# Patient Record
Sex: FEMALE | Race: WHITE | NOT HISPANIC OR LATINO | Employment: FULL TIME | URBAN - METROPOLITAN AREA
[De-identification: names, ages, dates, MRNs, and addresses within clinical notes are randomized per-mention and may not be internally consistent; named-entity substitution may affect disease eponyms.]

---

## 2018-05-19 ENCOUNTER — HISTORICAL (OUTPATIENT)
Dept: LAB | Facility: HOSPITAL | Age: 63
End: 2018-05-19

## 2018-05-19 LAB
ALBUMIN SERPL-MCNC: 3.6 GM/DL (ref 3.4–5)
ALBUMIN/GLOB SERPL: 1.1 RATIO (ref 1.1–2)
ALP SERPL-CCNC: 104 UNIT/L (ref 46–116)
ALT SERPL-CCNC: 22 UNIT/L (ref 12–78)
AST SERPL-CCNC: 12 UNIT/L (ref 10–37)
BILIRUB SERPL-MCNC: 0.3 MG/DL (ref 0.2–1)
BILIRUBIN DIRECT+TOT PNL SERPL-MCNC: 0.05 MG/DL (ref 0–0.2)
BILIRUBIN DIRECT+TOT PNL SERPL-MCNC: 0.25 MG/DL
BUN SERPL-MCNC: 18 MG/DL (ref 7–18)
CALCIUM SERPL-MCNC: 9.1 MG/DL (ref 8.5–10.1)
CHLORIDE SERPL-SCNC: 107 MMOL/L (ref 98–107)
CHOLEST SERPL-MCNC: 188 MG/DL (ref 50–200)
CHOLEST/HDLC SERPL: 4 {RATIO} (ref 0–5)
CO2 SERPL-SCNC: 28.8 MMOL/L (ref 21–32)
CREAT SERPL-MCNC: 0.76 MG/DL (ref 0.55–1.02)
GLOBULIN SER-MCNC: 3.3 GM/DL (ref 2.4–3.5)
GLUCOSE SERPL-MCNC: 100 MG/DL (ref 74–106)
HDLC SERPL-MCNC: 44 MG/DL (ref 35–60)
LDLC SERPL CALC-MCNC: 115 MG/DL (ref 50–140)
POTASSIUM SERPL-SCNC: 4.6 MMOL/L (ref 3.5–5.1)
PROT SERPL-MCNC: 6.9 GM/DL (ref 6.4–8.2)
SODIUM SERPL-SCNC: 143 MMOL/L (ref 136–145)
T4 FREE SERPL-MCNC: 1.03 NG/DL (ref 0.76–4.46)
TRIGL SERPL-MCNC: 145 MG/DL (ref 30–150)
TSH SERPL-ACNC: 0.15 MIU/ML (ref 0.35–3.75)
VLDLC SERPL CALC-MCNC: 29 MG/DL

## 2019-01-26 ENCOUNTER — HISTORICAL (OUTPATIENT)
Dept: LAB | Facility: HOSPITAL | Age: 64
End: 2019-01-26

## 2019-01-26 LAB
ABS NEUT (OLG): 4.36
ALBUMIN SERPL-MCNC: 3.8 GM/DL (ref 3.4–5)
ALBUMIN/GLOB SERPL: 1.2 RATIO (ref 1.1–2)
ALP SERPL-CCNC: 96 UNIT/L (ref 46–116)
ALT SERPL-CCNC: 24 UNIT/L (ref 12–78)
APPEARANCE, UA: CLEAR
AST SERPL-CCNC: 14 UNIT/L (ref 10–37)
BACTERIA #/AREA URNS AUTO: NORMAL /HPF
BASOPHILS # BLD AUTO: 0.06 X10(3)/MCL
BASOPHILS NFR BLD AUTO: 0.7 %
BILIRUB SERPL-MCNC: 0.3 MG/DL (ref 0.2–1)
BILIRUB UR QL STRIP: NEGATIVE
BILIRUBIN DIRECT+TOT PNL SERPL-MCNC: 0.12 MG/DL (ref 0–0.2)
BILIRUBIN DIRECT+TOT PNL SERPL-MCNC: 0.18 MG/DL
BUN SERPL-MCNC: 14 MG/DL (ref 7–18)
CALCIUM SERPL-MCNC: 8.9 MG/DL (ref 8.5–10.1)
CHLORIDE SERPL-SCNC: 105 MMOL/L (ref 98–107)
CHOLEST SERPL-MCNC: 186 MG/DL (ref 50–200)
CHOLEST/HDLC SERPL: 4 {RATIO} (ref 0–5)
CO2 SERPL-SCNC: 28.5 MMOL/L (ref 21–32)
COLOR UR: YELLOW
CREAT SERPL-MCNC: 0.69 MG/DL (ref 0.55–1.02)
EOSINOPHIL # BLD AUTO: 0.52 X10(3)/MCL
EOSINOPHIL NFR BLD AUTO: 6.2 %
ERYTHROCYTE [DISTWIDTH] IN BLOOD BY AUTOMATED COUNT: 14 %
GLOBULIN SER-MCNC: 3.2 GM/DL (ref 2.4–3.5)
GLUCOSE (UA): NEGATIVE
GLUCOSE SERPL-MCNC: 108 MG/DL (ref 74–106)
HCT VFR BLD AUTO: 47.5 % (ref 34–46)
HDLC SERPL-MCNC: 53 MG/DL (ref 35–60)
HGB BLD-MCNC: 16.1 GM/DL (ref 11.3–15.4)
HGB UR QL STRIP: NEGATIVE
IMM GRANULOCYTES # BLD AUTO: 0.04 10*3/UL (ref 0–0.1)
IMM GRANULOCYTES NFR BLD AUTO: 0.5 % (ref 0–1)
KETONES UR QL STRIP: NEGATIVE
LDLC SERPL CALC-MCNC: 115 MG/DL (ref 50–140)
LEUKOCYTE ESTERASE UR QL STRIP: NEGATIVE
LYMPHOCYTES # BLD AUTO: 2.8 X10(3)/MCL
LYMPHOCYTES NFR BLD AUTO: 33.3 %
MCH RBC QN AUTO: 29.9 PG (ref 27–33)
MCHC RBC AUTO-ENTMCNC: 33.9 GM/DL (ref 32–35)
MCV RBC AUTO: 88.1 FL (ref 81–97)
MONOCYTES # BLD AUTO: 0.63 X10(3)/MCL
MONOCYTES NFR BLD AUTO: 7.5 %
MUCOUS THREADS URNS QL MICRO: PRESENT
NEUTROPHILS # BLD AUTO: 4.36 X10(3)/MCL
NEUTROPHILS NFR BLD AUTO: 51.8 %
NITRITE UR QL STRIP.AUTO: NEGATIVE
PH UR STRIP: 6 [PH] (ref 4.6–8)
PLATELET # BLD AUTO: 250 X10(3)/MCL (ref 151–368)
PMV BLD AUTO: 10 FL
POTASSIUM SERPL-SCNC: 4.2 MMOL/L (ref 3.5–5.1)
PROT SERPL-MCNC: 7 GM/DL (ref 6.4–8.2)
PROT UR QL STRIP: NEGATIVE
RBC # BLD AUTO: 5.39 X10(6)/MCL (ref 3.9–5)
RBC #/AREA URNS HPF: NORMAL /[HPF]
SODIUM SERPL-SCNC: 141 MMOL/L (ref 136–145)
SP GR UR STRIP: 1.02 (ref 1–1.03)
SQUAMOUS EPITHELIAL, UA: NORMAL
TRIGL SERPL-MCNC: 90 MG/DL (ref 30–150)
UROBILINOGEN UR STRIP-ACNC: 0.2
VLDLC SERPL CALC-MCNC: 18 MG/DL
WBC # SPEC AUTO: 8.41 X10(3)/MCL (ref 3.4–9.2)
WBC #/AREA URNS AUTO: NORMAL /HPF

## 2019-02-25 ENCOUNTER — HISTORICAL (OUTPATIENT)
Dept: RADIOLOGY | Facility: HOSPITAL | Age: 64
End: 2019-02-25

## 2020-09-30 ENCOUNTER — HISTORICAL (OUTPATIENT)
Dept: LAB | Facility: HOSPITAL | Age: 65
End: 2020-09-30

## 2020-09-30 LAB
ABS NEUT (OLG): 5.65
ALBUMIN SERPL-MCNC: 4 GM/DL (ref 3.4–4.8)
ALBUMIN/GLOB SERPL: 1.3 RATIO (ref 1.1–2)
ALP SERPL-CCNC: 108 UNIT/L (ref 40–150)
ALT SERPL-CCNC: 20 UNIT/L (ref 0–55)
AST SERPL-CCNC: 17 UNIT/L (ref 5–34)
BASOPHILS # BLD AUTO: 0.06 X10(3)/MCL
BASOPHILS NFR BLD AUTO: 0.7 %
BILIRUB SERPL-MCNC: 0.5 MG/DL
BILIRUBIN DIRECT+TOT PNL SERPL-MCNC: 0.2 MG/DL (ref 0–0.5)
BILIRUBIN DIRECT+TOT PNL SERPL-MCNC: 0.3 MG/DL
BUN SERPL-MCNC: 9 MG/DL (ref 9.8–20.1)
CALCIUM SERPL-MCNC: 9.4 MG/DL (ref 8.4–10.2)
CHLORIDE SERPL-SCNC: 107 MMOL/L (ref 98–107)
CHOLEST SERPL-MCNC: 205 MG/DL
CHOLEST/HDLC SERPL: 5 {RATIO} (ref 0–5)
CO2 SERPL-SCNC: 27 MEQ/L (ref 23–31)
CREAT SERPL-MCNC: 0.79 MG/DL (ref 0.55–1.02)
EOSINOPHIL # BLD AUTO: 0.45 X10(3)/MCL
EOSINOPHIL NFR BLD AUTO: 5 %
ERYTHROCYTE [DISTWIDTH] IN BLOOD BY AUTOMATED COUNT: 15 %
GLOBULIN SER-MCNC: 3.1 GM/DL (ref 2.4–3.5)
GLUCOSE SERPL-MCNC: 106 MG/DL (ref 82–115)
HCT VFR BLD AUTO: 48.6 % (ref 34–46)
HDLC SERPL-MCNC: 43 MG/DL (ref 35–60)
HGB BLD-MCNC: 16.2 GM/DL (ref 11.3–15.4)
IMM GRANULOCYTES # BLD AUTO: 0.02 10*3/UL (ref 0–0.1)
IMM GRANULOCYTES NFR BLD AUTO: 0.2 % (ref 0–1)
LDLC SERPL CALC-MCNC: 123 MG/DL (ref 50–140)
LYMPHOCYTES # BLD AUTO: 2.25 X10(3)/MCL
LYMPHOCYTES NFR BLD AUTO: 24.8 %
MCH RBC QN AUTO: 28.7 PG (ref 27–33)
MCHC RBC AUTO-ENTMCNC: 33.3 GM/DL (ref 32–35)
MCV RBC AUTO: 86 FL (ref 81–97)
MONOCYTES # BLD AUTO: 0.63 X10(3)/MCL
MONOCYTES NFR BLD AUTO: 7 %
NEUTROPHILS # BLD AUTO: 5.65 X10(3)/MCL
NEUTROPHILS NFR BLD AUTO: 62.3 %
PLATELET # BLD AUTO: 269 X10(3)/MCL (ref 140–450)
PMV BLD AUTO: 11 FL
POTASSIUM SERPL-SCNC: 4.2 MMOL/L (ref 3.5–5.1)
PROT SERPL-MCNC: 7.1 GM/DL (ref 5.8–7.6)
RBC # BLD AUTO: 5.65 X10(6)/MCL (ref 3.9–5)
SODIUM SERPL-SCNC: 144 MMOL/L (ref 136–145)
TRIGL SERPL-MCNC: 197 MG/DL (ref 37–140)
TSH SERPL-ACNC: 0.3 UIU/ML (ref 0.35–4.94)
VLDLC SERPL CALC-MCNC: 39 MG/DL
WBC # SPEC AUTO: 9.06 X10(3)/MCL (ref 3.4–9.2)

## 2020-10-23 ENCOUNTER — HISTORICAL (OUTPATIENT)
Dept: LAB | Facility: HOSPITAL | Age: 65
End: 2020-10-23

## 2020-10-23 LAB
T4 FREE SERPL-MCNC: 1.05 NG/DL (ref 0.7–1.48)
TSH SERPL-ACNC: 0.44 UIU/ML (ref 0.35–4.94)

## 2020-12-22 ENCOUNTER — HISTORICAL (OUTPATIENT)
Dept: RADIOLOGY | Facility: HOSPITAL | Age: 65
End: 2020-12-22

## 2020-12-23 ENCOUNTER — HISTORICAL (OUTPATIENT)
Dept: RADIOLOGY | Facility: HOSPITAL | Age: 65
End: 2020-12-23

## 2020-12-28 ENCOUNTER — HISTORICAL (OUTPATIENT)
Dept: RADIOLOGY | Facility: HOSPITAL | Age: 65
End: 2020-12-28

## 2021-01-06 ENCOUNTER — HISTORICAL (OUTPATIENT)
Dept: RADIOLOGY | Facility: HOSPITAL | Age: 66
End: 2021-01-06

## 2021-01-20 ENCOUNTER — HISTORICAL (OUTPATIENT)
Dept: RADIOLOGY | Facility: HOSPITAL | Age: 66
End: 2021-01-20

## 2021-01-20 LAB — POC CREATININE: 0.6 MG/DL (ref 0.6–1.3)

## 2021-02-02 ENCOUNTER — HISTORICAL (OUTPATIENT)
Dept: RESPIRATORY THERAPY | Facility: HOSPITAL | Age: 66
End: 2021-02-02

## 2021-03-10 ENCOUNTER — HISTORICAL (OUTPATIENT)
Dept: RADIOLOGY | Facility: HOSPITAL | Age: 66
End: 2021-03-10

## 2021-03-20 LAB — EST CREAT CLEARANCE SER (OHS): 79.98 ML/MIN

## 2021-07-16 ENCOUNTER — HISTORICAL (OUTPATIENT)
Dept: LAB | Facility: HOSPITAL | Age: 66
End: 2021-07-16

## 2021-07-16 LAB
ABS NEUT (OLG): 4.91
ALBUMIN SERPL-MCNC: 4 GM/DL (ref 3.4–4.8)
ALBUMIN/GLOB SERPL: 1.3 RATIO (ref 1.1–2)
ALP SERPL-CCNC: 99 UNIT/L (ref 40–150)
ALT SERPL-CCNC: 67 UNIT/L (ref 0–55)
APPEARANCE, UA: CLEAR
AST SERPL-CCNC: 34 UNIT/L (ref 5–34)
BACTERIA SPEC CULT: ABNORMAL
BASOPHILS # BLD AUTO: 0.03 X10(3)/MCL
BASOPHILS NFR BLD AUTO: 0.4 %
BILIRUB SERPL-MCNC: 0.2 MG/DL
BILIRUB UR QL STRIP: NEGATIVE
BILIRUBIN DIRECT+TOT PNL SERPL-MCNC: 0.1 MG/DL
BILIRUBIN DIRECT+TOT PNL SERPL-MCNC: 0.1 MG/DL (ref 0–0.5)
BUN SERPL-MCNC: 9 MG/DL (ref 9.8–20.1)
CALCIUM SERPL-MCNC: 10 MG/DL (ref 8.4–10.2)
CHLORIDE SERPL-SCNC: 107 MMOL/L (ref 98–107)
CHOLEST SERPL-MCNC: 235 MG/DL
CHOLEST/HDLC SERPL: 4 {RATIO} (ref 0–5)
CO2 SERPL-SCNC: 27 MEQ/L (ref 23–31)
COLOR UR: YELLOW
CREAT SERPL-MCNC: 0.79 MG/DL (ref 0.55–1.02)
DEPRECATED CALCIDIOL+CALCIFEROL SERPL-MC: 33.5 NG/ML (ref 30–80)
EOSINOPHIL # BLD AUTO: 0.12 X10(3)/MCL
EOSINOPHIL NFR BLD AUTO: 1.4 %
ERYTHROCYTE [DISTWIDTH] IN BLOOD BY AUTOMATED COUNT: 17 %
GLOBULIN SER-MCNC: 3.1 GM/DL (ref 2.4–3.5)
GLUCOSE (UA): NEGATIVE
GLUCOSE SERPL-MCNC: 99 MG/DL (ref 82–115)
HCT VFR BLD AUTO: 41 % (ref 34–46)
HDLC SERPL-MCNC: 60 MG/DL (ref 35–60)
HGB BLD-MCNC: 13.4 GM/DL (ref 11.3–15.4)
HGB UR QL STRIP: ABNORMAL
IMM GRANULOCYTES # BLD AUTO: 0.16 10*3/UL (ref 0–0.1)
IMM GRANULOCYTES NFR BLD AUTO: 1.9 % (ref 0–1)
KETONES UR QL STRIP: NEGATIVE
LDLC SERPL CALC-MCNC: 137 MG/DL (ref 50–140)
LEUKOCYTE ESTERASE UR QL STRIP: NEGATIVE
LYMPHOCYTES # BLD AUTO: 2.22 X10(3)/MCL
LYMPHOCYTES NFR BLD AUTO: 26 %
MCH RBC QN AUTO: 28.1 PG (ref 27–33)
MCHC RBC AUTO-ENTMCNC: 32.7 GM/DL (ref 32–35)
MCV RBC AUTO: 86 FL (ref 81–97)
MONOCYTES # BLD AUTO: 1.09 X10(3)/MCL
MONOCYTES NFR BLD AUTO: 12.8 %
NEUTROPHILS # BLD AUTO: 4.91 X10(3)/MCL
NEUTROPHILS NFR BLD AUTO: 57.5 %
NITRITE UR QL STRIP: NEGATIVE
PH UR STRIP: 5.5 [PH] (ref 4.6–8)
PLATELET # BLD AUTO: 166 X10(3)/MCL (ref 140–450)
PMV BLD AUTO: 10 FL
POTASSIUM SERPL-SCNC: 4.5 MMOL/L (ref 3.5–5.1)
PROT SERPL-MCNC: 7.1 GM/DL (ref 5.8–7.6)
PROT UR QL STRIP: NEGATIVE
RBC # BLD AUTO: 4.77 X10(6)/MCL (ref 3.9–5)
RBC #/AREA URNS HPF: ABNORMAL /HPF
SODIUM SERPL-SCNC: 145 MMOL/L (ref 136–145)
SP GR UR STRIP: 1.02 (ref 1–1.03)
SQUAMOUS EPITHELIAL, UA: ABNORMAL
TRIGL SERPL-MCNC: 192 MG/DL (ref 37–140)
TSH SERPL-ACNC: 4.95 UIU/ML (ref 0.35–4.94)
UROBILINOGEN UR STRIP-ACNC: 0.2
VLDLC SERPL CALC-MCNC: 38 MG/DL
WBC # SPEC AUTO: 8.53 X10(3)/MCL (ref 3.4–9.2)
WBC #/AREA URNS HPF: ABNORMAL /HPF

## 2021-07-21 LAB
BUN SERPL-MCNC: 12 MG/DL (ref 7–18)
CALCIUM SERPL-MCNC: 9.1 MG/DL (ref 8.5–10.1)
CHLORIDE SERPL-SCNC: 106 MMOL/L (ref 98–107)
CO2 SERPL-SCNC: 29 MMOL/L (ref 21–32)
CREAT SERPL-MCNC: 1.11 MG/DL (ref 0.6–1.3)
GLUCOSE SERPL-MCNC: 94 MG/DL (ref 74–106)
POTASSIUM SERPL-SCNC: 4.4 MMOL/L (ref 3.5–5.1)
SODIUM SERPL-SCNC: 141 MEQ/L (ref 131–145)

## 2021-08-23 ENCOUNTER — HISTORICAL (OUTPATIENT)
Dept: LAB | Facility: HOSPITAL | Age: 66
End: 2021-08-23

## 2021-08-23 LAB
FLUAV AG UPPER RESP QL IA.RAPID: NEGATIVE
FLUBV AG UPPER RESP QL IA.RAPID: NEGATIVE
SARS-COV-2 RNA RESP QL NAA+PROBE: NOT DETECTED

## 2021-10-05 ENCOUNTER — HISTORICAL (OUTPATIENT)
Dept: ADMINISTRATIVE | Facility: HOSPITAL | Age: 66
End: 2021-10-05

## 2021-10-05 LAB — POC CREATININE: 0.8 MG/DL (ref 0.6–1.3)

## 2021-11-12 ENCOUNTER — HISTORICAL (OUTPATIENT)
Dept: LAB | Facility: HOSPITAL | Age: 66
End: 2021-11-12

## 2021-11-12 LAB — SARS-COV-2 AG RESP QL IA.RAPID: NEGATIVE

## 2021-11-26 ENCOUNTER — HISTORICAL (OUTPATIENT)
Dept: LAB | Facility: HOSPITAL | Age: 66
End: 2021-11-26

## 2021-11-26 LAB
CHOLEST SERPL-MCNC: 181 MG/DL
CHOLEST/HDLC SERPL: 3 {RATIO} (ref 0–5)
DEPRECATED CALCIDIOL+CALCIFEROL SERPL-MC: 53.8 NG/ML (ref 30–80)
HDLC SERPL-MCNC: 53 MG/DL (ref 35–60)
LDLC SERPL CALC-MCNC: 86 MG/DL (ref 50–140)
TRIGL SERPL-MCNC: 209 MG/DL (ref 37–140)
VIT B12 SERPL-MCNC: 860 PG/ML (ref 213–816)
VLDLC SERPL CALC-MCNC: 42 MG/DL

## 2021-12-09 ENCOUNTER — HISTORICAL (OUTPATIENT)
Dept: LAB | Facility: HOSPITAL | Age: 66
End: 2021-12-09

## 2022-03-17 ENCOUNTER — HISTORICAL (OUTPATIENT)
Dept: RADIOLOGY | Facility: HOSPITAL | Age: 67
End: 2022-03-17

## 2022-03-17 ENCOUNTER — HISTORICAL (OUTPATIENT)
Dept: ADMINISTRATIVE | Facility: HOSPITAL | Age: 67
End: 2022-03-17

## 2022-04-10 ENCOUNTER — HISTORICAL (OUTPATIENT)
Dept: ADMINISTRATIVE | Facility: HOSPITAL | Age: 67
End: 2022-04-10
Payer: COMMERCIAL

## 2022-04-19 ENCOUNTER — HISTORICAL (OUTPATIENT)
Dept: ADMINISTRATIVE | Facility: HOSPITAL | Age: 67
End: 2022-04-19
Payer: COMMERCIAL

## 2022-04-19 LAB
ABS NEUT (OLG): 3.57 (ref 2.1–9.2)
ALBUMIN SERPL-MCNC: 3.6 G/DL (ref 3.4–4.8)
ALBUMIN/GLOB SERPL: 1.4 {RATIO} (ref 1.1–2)
ALP SERPL-CCNC: 114 U/L (ref 40–150)
ALT SERPL-CCNC: 20 U/L (ref 0–55)
AST SERPL-CCNC: 19 U/L (ref 5–34)
BASOPHILS # BLD AUTO: 0.05 10*3/UL (ref 0–0.2)
BASOPHILS NFR BLD AUTO: 0.8 % (ref 0–1)
BILIRUB SERPL-MCNC: 0.3 MG/DL (ref 0.2–1.2)
BILIRUBIN DIRECT+TOT PNL SERPL-MCNC: 0.1 (ref 0–0.5)
BILIRUBIN DIRECT+TOT PNL SERPL-MCNC: 0.2 (ref 0–0.8)
BUN SERPL-MCNC: 9.9 MG/DL (ref 9.8–20.1)
CALCIUM SERPL-MCNC: 8.4 MG/DL (ref 8.4–10.2)
CHLORIDE SERPL-SCNC: 105 MMOL/L (ref 98–107)
CO2 SERPL-SCNC: 25 MMOL/L (ref 23–31)
CREAT SERPL-MCNC: 0.68 MG/DL (ref 0.57–1.11)
EOSINOPHIL # BLD AUTO: 0.22 10*3/UL (ref 0–0.9)
EOSINOPHIL NFR BLD AUTO: 3.3 % (ref 0–6.4)
ERYTHROCYTE [DISTWIDTH] IN BLOOD BY AUTOMATED COUNT: 14 % (ref 11.5–17)
GLOBULIN SER-MCNC: 2.5 G/DL (ref 2.4–3.5)
GLUCOSE SERPL-MCNC: 93 MG/DL (ref 82–115)
HCT VFR BLD AUTO: 35.7 % (ref 37–47)
HEMOLYSIS INTERF INDEX SERPL-ACNC: 17
HGB BLD-MCNC: 11.5 G/DL (ref 12–16)
ICTERIC INTERF INDEX SERPL-ACNC: 1
IMM GRANULOCYTES # BLD AUTO: 0.06 10*3/UL (ref 0–0.02)
IMM GRANULOCYTES NFR BLD AUTO: 0.9 % (ref 0–0.43)
LIPEMIC INTERF INDEX SERPL-ACNC: 1
LYMPHOCYTES # BLD AUTO: 2.19 10*3/UL (ref 0.6–4.6)
LYMPHOCYTES NFR BLD AUTO: 33.3 % (ref 16–44)
MANUAL DIFF? (OHS): NO
MCH RBC QN AUTO: 25.9 PG (ref 27–31)
MCHC RBC AUTO-ENTMCNC: 32.2 G/DL (ref 33–36)
MCV RBC AUTO: 80.4 FL (ref 80–94)
MONOCYTES # BLD AUTO: 0.48 10*3/UL (ref 0.1–1.3)
MONOCYTES NFR BLD AUTO: 7.3 % (ref 4–12.1)
NEUTROPHILS # BLD AUTO: 3.57 10*3/UL (ref 2.1–9.2)
NEUTROPHILS NFR BLD AUTO: 54.4 % (ref 43–73)
NRBC BLD AUTO-RTO: 0 % (ref 0–0.2)
PLATELET # BLD AUTO: 219 10*3/UL (ref 130–400)
PMV BLD AUTO: 10 FL (ref 7.4–10.4)
POC CREATININE: 0.7 (ref 0.6–1.3)
POTASSIUM SERPL-SCNC: 3.7 MMOL/L (ref 3.5–5.1)
PROT SERPL-MCNC: 6.1 G/DL (ref 5.8–7.6)
RBC # BLD AUTO: 4.44 10*6/UL (ref 4.2–5.4)
SODIUM SERPL-SCNC: 141 MMOL/L (ref 136–145)
WBC # SPEC AUTO: 6.6 10*3/UL (ref 4.5–11.5)

## 2022-04-22 ENCOUNTER — HISTORICAL (OUTPATIENT)
Dept: LAB | Facility: HOSPITAL | Age: 67
End: 2022-04-22
Payer: COMMERCIAL

## 2022-04-22 LAB
ALBUMIN SERPL-MCNC: 4 G/DL (ref 3.4–4.8)
ALP SERPL-CCNC: 132 U/L (ref 40–150)
ALT SERPL-CCNC: 22 U/L (ref 0–55)
AST SERPL-CCNC: 18 U/L (ref 5–34)
BILIRUB SERPL-MCNC: 0.3 MG/DL
BILIRUBIN DIRECT+TOT PNL SERPL-MCNC: 0.1 (ref 0–0.5)
BILIRUBIN DIRECT+TOT PNL SERPL-MCNC: 0.2
CHOLEST SERPL-MCNC: 201 MG/DL
CHOLEST/HDLC SERPL: 3 {RATIO} (ref 0–5)
HDLC SERPL-MCNC: 58 MG/DL (ref 35–60)
HEMOLYSIS INTERF INDEX SERPL-ACNC: 4
ICTERIC INTERF INDEX SERPL-ACNC: 1
LDLC SERPL CALC-MCNC: 113 MG/DL (ref 50–140)
LIPEMIC INTERF INDEX SERPL-ACNC: 6
PROT SERPL-MCNC: 7 G/DL (ref 5.8–7.6)
TRIGL SERPL-MCNC: 151 MG/DL (ref 37–140)
VLDLC SERPL CALC-MCNC: 30 MG/DL

## 2022-04-26 DIAGNOSIS — E03.9 HYPOTHYROIDISM, ADULT: Primary | ICD-10-CM

## 2022-04-26 DIAGNOSIS — C34.92 SQUAMOUS CELL CARCINOMA LUNG, LEFT: ICD-10-CM

## 2022-04-27 VITALS
SYSTOLIC BLOOD PRESSURE: 109 MMHG | DIASTOLIC BLOOD PRESSURE: 66 MMHG | WEIGHT: 156.5 LBS | OXYGEN SATURATION: 99 % | HEIGHT: 65 IN | BODY MASS INDEX: 26.08 KG/M2

## 2022-05-05 ENCOUNTER — TELEPHONE (OUTPATIENT)
Dept: HEMATOLOGY/ONCOLOGY | Facility: CLINIC | Age: 67
End: 2022-05-05
Payer: COMMERCIAL

## 2022-05-11 ENCOUNTER — TELEPHONE (OUTPATIENT)
Dept: HEMATOLOGY/ONCOLOGY | Facility: CLINIC | Age: 67
End: 2022-05-11
Payer: COMMERCIAL

## 2022-05-18 ENCOUNTER — OFFICE VISIT (OUTPATIENT)
Dept: HEMATOLOGY/ONCOLOGY | Facility: CLINIC | Age: 67
End: 2022-05-18
Payer: COMMERCIAL

## 2022-05-18 VITALS
HEIGHT: 65 IN | SYSTOLIC BLOOD PRESSURE: 121 MMHG | WEIGHT: 162.88 LBS | TEMPERATURE: 98 F | RESPIRATION RATE: 18 BRPM | HEART RATE: 78 BPM | OXYGEN SATURATION: 98 % | DIASTOLIC BLOOD PRESSURE: 74 MMHG | BODY MASS INDEX: 27.14 KG/M2

## 2022-05-18 DIAGNOSIS — J01.00 ACUTE NON-RECURRENT MAXILLARY SINUSITIS: Primary | ICD-10-CM

## 2022-05-18 DIAGNOSIS — C34.90 MALIGNANT NEOPLASM OF UNSPECIFIED PART OF UNSPECIFIED BRONCHUS OR LUNG: ICD-10-CM

## 2022-05-18 PROCEDURE — 3008F PR BODY MASS INDEX (BMI) DOCUMENTED: ICD-10-PCS | Mod: CPTII,,, | Performed by: INTERNAL MEDICINE

## 2022-05-18 PROCEDURE — 99213 PR OFFICE/OUTPT VISIT, EST, LEVL III, 20-29 MIN: ICD-10-PCS | Mod: ,,, | Performed by: INTERNAL MEDICINE

## 2022-05-18 PROCEDURE — 1125F PR PAIN SEVERITY QUANTIFIED, PAIN PRESENT: ICD-10-PCS | Mod: CPTII,,, | Performed by: INTERNAL MEDICINE

## 2022-05-18 PROCEDURE — 1159F PR MEDICATION LIST DOCUMENTED IN MEDICAL RECORD: ICD-10-PCS | Mod: CPTII,,, | Performed by: INTERNAL MEDICINE

## 2022-05-18 PROCEDURE — 1159F MED LIST DOCD IN RCRD: CPT | Mod: CPTII,,, | Performed by: INTERNAL MEDICINE

## 2022-05-18 PROCEDURE — 3078F PR MOST RECENT DIASTOLIC BLOOD PRESSURE < 80 MM HG: ICD-10-PCS | Mod: CPTII,,, | Performed by: INTERNAL MEDICINE

## 2022-05-18 PROCEDURE — 1125F AMNT PAIN NOTED PAIN PRSNT: CPT | Mod: CPTII,,, | Performed by: INTERNAL MEDICINE

## 2022-05-18 PROCEDURE — 3074F SYST BP LT 130 MM HG: CPT | Mod: CPTII,,, | Performed by: INTERNAL MEDICINE

## 2022-05-18 PROCEDURE — 99213 OFFICE O/P EST LOW 20 MIN: CPT | Mod: ,,, | Performed by: INTERNAL MEDICINE

## 2022-05-18 PROCEDURE — 3008F BODY MASS INDEX DOCD: CPT | Mod: CPTII,,, | Performed by: INTERNAL MEDICINE

## 2022-05-18 PROCEDURE — 3078F DIAST BP <80 MM HG: CPT | Mod: CPTII,,, | Performed by: INTERNAL MEDICINE

## 2022-05-18 PROCEDURE — 3074F PR MOST RECENT SYSTOLIC BLOOD PRESSURE < 130 MM HG: ICD-10-PCS | Mod: CPTII,,, | Performed by: INTERNAL MEDICINE

## 2022-05-18 RX ORDER — ERGOCALCIFEROL 1.25 MG/1
50000 CAPSULE ORAL
COMMUNITY
Start: 2022-04-25

## 2022-05-18 RX ORDER — FAMOTIDINE 40 MG/1
1 TABLET, FILM COATED ORAL NIGHTLY
COMMUNITY

## 2022-05-18 RX ORDER — VENLAFAXINE 37.5 MG/1
1 TABLET ORAL DAILY
COMMUNITY
Start: 2022-02-17 | End: 2022-06-06

## 2022-05-18 RX ORDER — PRAVASTATIN SODIUM 10 MG/1
10 TABLET ORAL DAILY
COMMUNITY
Start: 2022-04-12 | End: 2023-01-23

## 2022-05-18 RX ORDER — ATORVASTATIN CALCIUM 20 MG/1
1 TABLET, FILM COATED ORAL DAILY
COMMUNITY
End: 2022-12-07

## 2022-05-18 RX ORDER — FOLIC ACID 1 MG/1
1 TABLET ORAL DAILY
COMMUNITY
Start: 2022-02-01 | End: 2022-12-07

## 2022-05-18 RX ORDER — LEVOTHYROXINE SODIUM 88 UG/1
88 TABLET ORAL DAILY
COMMUNITY
Start: 2022-04-22 | End: 2022-12-07

## 2022-05-18 RX ORDER — SULFAMETHOXAZOLE AND TRIMETHOPRIM 800; 160 MG/1; MG/1
1 TABLET ORAL 2 TIMES DAILY
Qty: 20 TABLET | Refills: 0 | Status: SHIPPED | OUTPATIENT
Start: 2022-05-18 | End: 2022-05-28

## 2022-05-18 RX ORDER — DILTIAZEM HYDROCHLORIDE 120 MG/1
120 CAPSULE, COATED, EXTENDED RELEASE ORAL DAILY
COMMUNITY
Start: 2022-04-12

## 2022-05-18 RX ORDER — METOPROLOL SUCCINATE 25 MG/1
12.5 TABLET, EXTENDED RELEASE ORAL NIGHTLY
COMMUNITY
Start: 2022-04-19

## 2022-05-18 RX ORDER — ONDANSETRON HYDROCHLORIDE 8 MG/1
1 TABLET, FILM COATED ORAL DAILY
COMMUNITY
End: 2022-10-26 | Stop reason: SDUPTHER

## 2022-05-18 RX ORDER — ALPRAZOLAM 0.5 MG/1
0.5 TABLET ORAL NIGHTLY
COMMUNITY
Start: 2022-05-12 | End: 2023-12-26 | Stop reason: SDUPTHER

## 2022-05-18 NOTE — PROGRESS NOTES
[facility] at [facility]    PATIENT: Jacklyn Appiah  MRN: [id number]  DATE: 5/18/2022      Diagnosis:   1. Acute non-recurrent maxillary sinusitis    2. 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 Appiah is a very pleasant 66-year-old white female with a history of thyroid cancer in 2020 and cervical cancer treated with hysterectomy in 1979. She underwent low-dose CT lung cancer screening 12/2020 which revealed a 2.2 cm left upper lobe mass. She is essentially asymptomatic. She was seen by pulmonologist, Dr. Fox Aviles who ordered a 1/6/21 PET/CT which revealed a spiculated left upper lobe nodule measuring up to about 22 mm. The maximum SUV is 7.9. MRI of brain was normal. She was referred to Dr. Larsen who performed a VATs wedge resection for biopst followed by a lobectomy on 3/19/21.  She has a long history of tobacco use.     Pathology/Staging    Resection 3/19/21 left thoracotomy with LEOBARDO Lobectomy by Dr. Larsen    Histologic Type: Invasive adenocarcinoma, acinar predominant    Total Tumor Size (size of entire tumor): 2.7 Centimeters (cm)  Visceral Pleura Invasion: Present  Margins: All margins are uninvolved by tumor  Lymph Nodes Involved: 0/8 +  Pathologic Stage Classification (pTNM, AJCC 8th Edition): Stage IB  Primary Tumor (pT): pT2a  Regional Lymph Nodes (pN): pN0    Interval History:  8/13/21: EGFR negative so patient not a candidate for adjuvant Tagrisso. F/U visits will follow NCCN surveillance guidelines with H&P, Chest CT + contrast q 6 months for 2 years, then, H&P with LD non-contrast-enhanced chest CT annually.    10/13/21: Jacklyn returns for follow up of stage I  lung cancer, s/p resection and 4 cycles of carbo/alimta. Pt had a CT scan which shows a stable chest node and stable adrenal nodularities. She is noting some rectal bleeding without change in stool, and she is seeing GI for that. She also notes oral lesions which are similar to episodes of lichens planus. Otherwise feeling well. She quite smoking at the time of her surgery and did not resume smoking. She has had 2 doses of the Moderna vaccine.    4/22/22: Jacklyn returns for follow up of stage I lung cancer. She does not feel well. She notes that she has a headache for 3 weeks and nausea for 2 weeks. This is not typical for her usual migraines. She also notes fairly extreme fatigue. She states that she sleeps most of the time she is not working. She also notes some right hip pain which has been present for 2 months and this is associated with some numbness in the right anterior thigh whenever she walks. She was noted to have a TSH of 10 back in 6/21, but her dose was not adjusted.    Assessment/Plan 1. Adenocarcinoma of left lung C34.92 Pt with history of lung cancer. I am concerned about her HA, nausea, fatigue, and RUE weakness. Will get MRI of the brain. I will also get hip films and a bone scan because of her new onset R hip pain. F/U after above.   2. Hypothyroidism E03.9 Will repeat thyroid studies and adjust levothyroxine accordingly.      Subjective:    Interval History: Ms. Appiah returns for follow up.  She continues to have headaches, but her MRI showed maxillary and ethmoid sinusitis which was acute appearing. Otherwise, she is feeling well.  Scans were negative for cancer.     Past Medical History:   Past Medical History:   Diagnosis Date    Adenocarcinoma of left lung     Mukherjee's esophagus     Bilateral stenosis of carotid arteries greater than 50%     Celiac disease     Cervical cancer     Depression     GERD (gastroesophageal reflux disease)     Heart attack     Heart disease      Hyperlipidemia     Hypertension     Irregular heart beat     Lung mass     Migraine     Panic attack     Restless leg     Seizures     Stroke     Thyroid disease     TIA (transient ischemic attack)        Past Surgical HIstory:   Past Surgical History:   Procedure Laterality Date    APPENDECTOMY      CERVICAL BIOPSY      CHOLECYSTECTOMY      COLONOSCOPY      EGD, WITH BALLOON DILATION      HYSTERECTOMY      THORACOSCOPY      THYROIDECTOMY      TONSILLECTOMY, ADENOIDECTOMY         Family History:   Family History   Problem Relation Age of Onset    Cancer Mother     Diabetes Mother     Heart disease Mother     Hyperlipidemia Mother     Hypertension Mother     Kidney disease Mother     Stroke Mother     Alzheimer's disease Father     Heart attack Brother        Social History:  reports that she has quit smoking. She has never used smokeless tobacco. She reports that she does not drink alcohol and does not use drugs.    Allergies:  Review of patient's allergies indicates:  No Known Allergies    Medications:  Current Outpatient Medications   Medication Sig Dispense Refill    ALPRAZolam (XANAX) 0.5 MG tablet Take 0.5 mg by mouth nightly.      atorvastatin (LIPITOR) 20 MG tablet Take 1 tablet by mouth once daily at 6am.      diltiaZEM (CARDIZEM CD) 120 MG Cp24 Take 120 mg by mouth once daily.      ergocalciferol (ERGOCALCIFEROL) 50,000 unit Cap Take 50,000 Units by mouth every 7 days.      famotidine (PEPCID) 40 MG tablet Take 1 tablet by mouth every evening.      folic acid (FOLVITE) 1 MG tablet Take 1 tablet by mouth once daily.      levothyroxine (SYNTHROID) 88 MCG tablet Take 88 mcg by mouth once daily at 6am.      metoprolol succinate (TOPROL-XL) 25 MG 24 hr tablet Take 12.5 mg by mouth nightly.      ondansetron (ZOFRAN) 8 MG tablet Take 1 tablet by mouth once daily.      pravastatin (PRAVACHOL) 10 MG tablet Take 10 mg by mouth once daily.      venlafaxine (EFFEXOR) 37.5 MG Tab  "Take 1 tablet by mouth once daily.      sulfamethoxazole-trimethoprim 800-160mg (BACTRIM DS) 800-160 mg Tab Take 1 tablet by mouth 2 (two) times daily. for 10 days 20 tablet 0     No current facility-administered medications for this visit.       Review of Systems   Constitutional: Negative for appetite change and unexpected weight change.   HENT: Negative for mouth sores.    Eyes: Negative for visual disturbance.   Respiratory: Negative for cough and shortness of breath.    Cardiovascular: Negative for chest pain.   Gastrointestinal: Negative for abdominal pain and diarrhea.   Genitourinary: Negative for frequency.   Musculoskeletal: Negative for back pain.   Skin: Positive for rash.   Neurological: Positive for headaches.   Hematological: Negative for adenopathy.   Psychiatric/Behavioral: The patient is not nervous/anxious.        Objective:      Vitals:   Vitals:    05/18/22 1403   BP: 121/74   BP Location: Right arm   Patient Position: Sitting   Pulse: 78   Resp: 18   Temp: 97.9 °F (36.6 °C)   TempSrc: Oral   SpO2: 98%   Weight: 73.9 kg (162 lb 14.4 oz)   Height: 5' 5.35" (1.66 m)     BMI: Body mass index is 26.82 kg/m².    Physical Exam  Vitals reviewed.   Constitutional:       Appearance: Normal appearance.   HENT:      Head: Normocephalic.      Comments: No maxillary tenderness to palpation     Mouth/Throat:      Mouth: Mucous membranes are moist.   Cardiovascular:      Rate and Rhythm: Normal rate and regular rhythm.      Heart sounds: Normal heart sounds.   Pulmonary:      Effort: Pulmonary effort is normal.      Breath sounds: Normal breath sounds.   Abdominal:      General: Abdomen is flat. Bowel sounds are normal.      Palpations: Abdomen is soft.   Neurological:      Mental Status: She is alert.         Laboratory Data:      Imaging:   Assessment/Plan:       1. Acute non-recurrent maxillary sinusitis    2. Malignant neoplasm of unspecified part of unspecified bronchus or lung      Will give 10 days of " septra for sinusitis.    Synthroid was decreased recently, will repeat TSH in one month.  F/U 10/22 with scans and labs.          Carolina Wilcox MD

## 2022-05-23 DIAGNOSIS — Z00.00 WELLNESS EXAMINATION: Primary | ICD-10-CM

## 2022-05-30 ENCOUNTER — LAB VISIT (OUTPATIENT)
Dept: LAB | Facility: HOSPITAL | Age: 67
End: 2022-05-30
Attending: FAMILY MEDICINE
Payer: COMMERCIAL

## 2022-05-30 DIAGNOSIS — Z00.00 WELLNESS EXAMINATION: ICD-10-CM

## 2022-05-30 LAB
ALBUMIN SERPL-MCNC: 4 GM/DL (ref 3.4–4.8)
ALBUMIN/GLOB SERPL: 1.3 RATIO (ref 1.1–2)
ALP SERPL-CCNC: 133 UNIT/L (ref 40–150)
ALT SERPL-CCNC: 22 UNIT/L (ref 0–55)
AST SERPL-CCNC: 18 UNIT/L (ref 5–34)
BASOPHILS # BLD AUTO: 0.06 X10(3)/MCL (ref 0–0.2)
BASOPHILS NFR BLD AUTO: 0.8 %
BILIRUBIN DIRECT+TOT PNL SERPL-MCNC: 0.3 MG/DL
BUN SERPL-MCNC: 12 MG/DL (ref 9.8–20.1)
CALCIUM SERPL-MCNC: 9.4 MG/DL (ref 8.4–10.2)
CHLORIDE SERPL-SCNC: 106 MMOL/L (ref 98–107)
CHOLEST SERPL-MCNC: 183 MG/DL
CHOLEST/HDLC SERPL: 4 {RATIO} (ref 0–5)
CO2 SERPL-SCNC: 26 MMOL/L (ref 23–31)
CREAT SERPL-MCNC: 0.87 MG/DL (ref 0.55–1.02)
EOSINOPHIL # BLD AUTO: 0.36 X10(3)/MCL (ref 0–0.9)
EOSINOPHIL NFR BLD AUTO: 4.8 %
ERYTHROCYTE [DISTWIDTH] IN BLOOD BY AUTOMATED COUNT: 14.6 % (ref 11.5–17)
GLOBULIN SER-MCNC: 3.1 GM/DL (ref 2.4–3.5)
GLUCOSE SERPL-MCNC: 118 MG/DL (ref 82–115)
HCT VFR BLD AUTO: 43.5 % (ref 37–47)
HDLC SERPL-MCNC: 51 MG/DL (ref 35–60)
HGB BLD-MCNC: 13.4 GM/DL (ref 12–16)
IMM GRANULOCYTES # BLD AUTO: 0.12 X10(3)/MCL (ref 0–0.02)
IMM GRANULOCYTES NFR BLD AUTO: 1.6 % (ref 0–0.43)
LDLC SERPL CALC-MCNC: 99 MG/DL (ref 50–140)
LYMPHOCYTES # BLD AUTO: 2.54 X10(3)/MCL (ref 0.6–4.6)
LYMPHOCYTES NFR BLD AUTO: 33.6 %
MCH RBC QN AUTO: 25.4 PG (ref 27–31)
MCHC RBC AUTO-ENTMCNC: 30.8 MG/DL (ref 33–36)
MCV RBC AUTO: 82.4 FL (ref 80–94)
MONOCYTES # BLD AUTO: 0.56 X10(3)/MCL (ref 0.1–1.3)
MONOCYTES NFR BLD AUTO: 7.4 %
NEUTROPHILS # BLD AUTO: 3.9 X10(3)/MCL (ref 2.1–9.2)
NEUTROPHILS NFR BLD AUTO: 51.8 %
NRBC BLD AUTO-RTO: 0 %
PLATELET # BLD AUTO: 279 X10(3)/MCL (ref 130–400)
PMV BLD AUTO: 10.2 FL (ref 9.4–12.4)
POTASSIUM SERPL-SCNC: 5 MMOL/L (ref 3.5–5.1)
PROT SERPL-MCNC: 7.1 GM/DL (ref 5.8–7.6)
RBC # BLD AUTO: 5.28 X10(6)/MCL (ref 4.2–5.4)
SODIUM SERPL-SCNC: 143 MMOL/L (ref 136–145)
TRIGL SERPL-MCNC: 166 MG/DL (ref 37–140)
TSH SERPL-ACNC: 2.42 UIU/ML (ref 0.35–4.94)
VLDLC SERPL CALC-MCNC: 33 MG/DL
WBC # SPEC AUTO: 7.6 X10(3)/MCL (ref 4.5–11.5)

## 2022-05-30 PROCEDURE — 80053 COMPREHEN METABOLIC PANEL: CPT

## 2022-05-30 PROCEDURE — 84443 ASSAY THYROID STIM HORMONE: CPT

## 2022-05-30 PROCEDURE — 85025 COMPLETE CBC W/AUTO DIFF WBC: CPT

## 2022-05-30 PROCEDURE — 80061 LIPID PANEL: CPT

## 2022-05-30 PROCEDURE — 36415 COLL VENOUS BLD VENIPUNCTURE: CPT

## 2022-06-06 ENCOUNTER — HOSPITAL ENCOUNTER (OUTPATIENT)
Dept: RADIOLOGY | Facility: HOSPITAL | Age: 67
Discharge: HOME | End: 2022-06-06
Attending: FAMILY MEDICINE
Payer: COMMERCIAL

## 2022-06-06 ENCOUNTER — OFFICE VISIT (OUTPATIENT)
Dept: FAMILY MEDICINE | Facility: CLINIC | Age: 67
End: 2022-06-06
Payer: COMMERCIAL

## 2022-06-06 VITALS
SYSTOLIC BLOOD PRESSURE: 115 MMHG | DIASTOLIC BLOOD PRESSURE: 70 MMHG | RESPIRATION RATE: 18 BRPM | HEART RATE: 80 BPM | BODY MASS INDEX: 27.25 KG/M2 | WEIGHT: 163.56 LBS | OXYGEN SATURATION: 98 % | TEMPERATURE: 97 F | HEIGHT: 65 IN

## 2022-06-06 DIAGNOSIS — F41.9 ANXIETY: ICD-10-CM

## 2022-06-06 DIAGNOSIS — M54.16 LUMBAR RADICULOPATHY, RIGHT: ICD-10-CM

## 2022-06-06 DIAGNOSIS — F32.5 MAJOR DEPRESSION IN COMPLETE REMISSION: ICD-10-CM

## 2022-06-06 DIAGNOSIS — E78.2 MIXED HYPERLIPIDEMIA: ICD-10-CM

## 2022-06-06 DIAGNOSIS — I10 PRIMARY HYPERTENSION: Primary | ICD-10-CM

## 2022-06-06 DIAGNOSIS — R29.898 WEAKNESS OF RIGHT LOWER EXTREMITY: ICD-10-CM

## 2022-06-06 DIAGNOSIS — E89.0 POSTOPERATIVE HYPOTHYROIDISM: ICD-10-CM

## 2022-06-06 DIAGNOSIS — K22.70 BARRETT'S ESOPHAGUS WITHOUT DYSPLASIA: ICD-10-CM

## 2022-06-06 PROBLEM — G25.81 RESTLESS LEGS SYNDROME: Status: ACTIVE | Noted: 2022-06-06

## 2022-06-06 PROBLEM — Z86.73 HISTORY OF TRANSIENT ISCHEMIC ATTACK: Status: ACTIVE | Noted: 2022-06-06

## 2022-06-06 PROBLEM — I65.23 BILATERAL CAROTID ARTERY STENOSIS: Status: ACTIVE | Noted: 2022-06-06

## 2022-06-06 PROBLEM — F41.0 PANIC ATTACK: Status: ACTIVE | Noted: 2022-06-06

## 2022-06-06 PROBLEM — E55.9 VITAMIN D DEFICIENCY: Status: ACTIVE | Noted: 2022-06-06

## 2022-06-06 PROBLEM — Z90.2 HISTORY OF LOBECTOMY OF LUNG: Status: ACTIVE | Noted: 2022-06-06

## 2022-06-06 PROBLEM — I67.82 CHRONIC CEREBRAL ISCHEMIA: Status: ACTIVE | Noted: 2022-06-06

## 2022-06-06 PROBLEM — M60.9 MYOSITIS: Status: ACTIVE | Noted: 2022-06-06

## 2022-06-06 PROBLEM — E78.5 HYPERLIPIDEMIA: Status: ACTIVE | Noted: 2022-06-06

## 2022-06-06 PROBLEM — G25.81 RESTLESS LEGS SYNDROME: Status: RESOLVED | Noted: 2022-06-06 | Resolved: 2022-06-06

## 2022-06-06 PROBLEM — M60.9 MYOSITIS: Status: RESOLVED | Noted: 2022-06-06 | Resolved: 2022-06-06

## 2022-06-06 PROBLEM — C34.12 PRIMARY ADENOCARCINOMA OF UPPER LOBE OF LEFT LUNG: Status: ACTIVE | Noted: 2022-06-06

## 2022-06-06 PROBLEM — K90.0 CELIAC DISEASE: Status: ACTIVE | Noted: 2022-06-06

## 2022-06-06 PROBLEM — G43.109 MIGRAINE WITH AURA: Status: RESOLVED | Noted: 2022-06-06 | Resolved: 2022-06-06

## 2022-06-06 PROBLEM — G43.109 MIGRAINE WITH AURA: Status: ACTIVE | Noted: 2022-06-06

## 2022-06-06 PROCEDURE — 1159F MED LIST DOCD IN RCRD: CPT | Mod: CPTII,,, | Performed by: FAMILY MEDICINE

## 2022-06-06 PROCEDURE — 1160F PR REVIEW ALL MEDS BY PRESCRIBER/CLIN PHARMACIST DOCUMENTED: ICD-10-PCS | Mod: CPTII,,, | Performed by: FAMILY MEDICINE

## 2022-06-06 PROCEDURE — 3008F PR BODY MASS INDEX (BMI) DOCUMENTED: ICD-10-PCS | Mod: CPTII,,, | Performed by: FAMILY MEDICINE

## 2022-06-06 PROCEDURE — 3078F PR MOST RECENT DIASTOLIC BLOOD PRESSURE < 80 MM HG: ICD-10-PCS | Mod: CPTII,,, | Performed by: FAMILY MEDICINE

## 2022-06-06 PROCEDURE — 3008F BODY MASS INDEX DOCD: CPT | Mod: CPTII,,, | Performed by: FAMILY MEDICINE

## 2022-06-06 PROCEDURE — 1126F AMNT PAIN NOTED NONE PRSNT: CPT | Mod: CPTII,,, | Performed by: FAMILY MEDICINE

## 2022-06-06 PROCEDURE — 99214 OFFICE O/P EST MOD 30 MIN: CPT | Mod: ,,, | Performed by: FAMILY MEDICINE

## 2022-06-06 PROCEDURE — 99214 PR OFFICE/OUTPT VISIT, EST, LEVL IV, 30-39 MIN: ICD-10-PCS | Mod: ,,, | Performed by: FAMILY MEDICINE

## 2022-06-06 PROCEDURE — 1159F PR MEDICATION LIST DOCUMENTED IN MEDICAL RECORD: ICD-10-PCS | Mod: CPTII,,, | Performed by: FAMILY MEDICINE

## 2022-06-06 PROCEDURE — 1160F RVW MEDS BY RX/DR IN RCRD: CPT | Mod: CPTII,,, | Performed by: FAMILY MEDICINE

## 2022-06-06 PROCEDURE — 1126F PR PAIN SEVERITY QUANTIFIED, NO PAIN PRESENT: ICD-10-PCS | Mod: CPTII,,, | Performed by: FAMILY MEDICINE

## 2022-06-06 PROCEDURE — 3078F DIAST BP <80 MM HG: CPT | Mod: CPTII,,, | Performed by: FAMILY MEDICINE

## 2022-06-06 PROCEDURE — 3074F SYST BP LT 130 MM HG: CPT | Mod: CPTII,,, | Performed by: FAMILY MEDICINE

## 2022-06-06 PROCEDURE — 1101F PR PT FALLS ASSESS DOC 0-1 FALLS W/OUT INJ PAST YR: ICD-10-PCS | Mod: CPTII,,, | Performed by: FAMILY MEDICINE

## 2022-06-06 PROCEDURE — 72100 X-RAY EXAM L-S SPINE 2/3 VWS: CPT | Mod: TC

## 2022-06-06 PROCEDURE — 3288F PR FALLS RISK ASSESSMENT DOCUMENTED: ICD-10-PCS | Mod: CPTII,,, | Performed by: FAMILY MEDICINE

## 2022-06-06 PROCEDURE — 3074F PR MOST RECENT SYSTOLIC BLOOD PRESSURE < 130 MM HG: ICD-10-PCS | Mod: CPTII,,, | Performed by: FAMILY MEDICINE

## 2022-06-06 PROCEDURE — 3288F FALL RISK ASSESSMENT DOCD: CPT | Mod: CPTII,,, | Performed by: FAMILY MEDICINE

## 2022-06-06 PROCEDURE — 1101F PT FALLS ASSESS-DOCD LE1/YR: CPT | Mod: CPTII,,, | Performed by: FAMILY MEDICINE

## 2022-06-06 RX ORDER — ASPIRIN 81 MG/1
81 TABLET ORAL DAILY
COMMUNITY

## 2022-06-06 RX ORDER — VENLAFAXINE HYDROCHLORIDE 75 MG/1
75 CAPSULE, EXTENDED RELEASE ORAL DAILY
Qty: 30 CAPSULE | Refills: 11 | Status: SHIPPED | OUTPATIENT
Start: 2022-06-06 | End: 2023-05-29

## 2022-06-06 NOTE — ASSESSMENT & PLAN NOTE
continue current meds, low salt diet, weight loss and exercise  Avoid drinking too much caffeine  Call me with pressure more than 140/90

## 2022-06-06 NOTE — PROGRESS NOTES
Jacklyn Appiah  06/06/2022  [id number]    Daniela Jacobs MD  Patient Care Team:  Daniela Ward MD as PCP - General (Family Medicine)      Chief Complaint:  Chief Complaint   Patient presents with    Follow-up       History of Present Illness:  HPI    67 y.o. female who presents today for routine follow up with labs. Labs reviewed by me and were discussed with patient. All questions regarding lab results were answered. She c/o depression. States she has gained weight, her kids no longer speak to her and she is describing anhedonia, not wanting to go to work, get out of bed and eat all of the time. Denies si/hi. She is on the lowest dose of effexor since 2018 and denies si/hi. She has failed cymbalta due to a rash.     She also c/o chronic right thigh lateral/anterior numbness that is intermittent for months. It stops at knee. Worse when standing or laying down, lasts for hours. She also reports chronic lower back pain, but denies weakness of right LE.       Carotid artery stenosis: seeing dr zurita, on cardizem    Hx of thyroid cancer: s/p thyroidectomy, on synthroid. She does see Dr. Diaz    Panic attacks: severe and frequent. On effexor and xanax    GERD/Mukherjee's esophagus: well controlled with PPI    Celiac disease: sees GI, well controlled    Hx of TIA: on aspirin and statin    HLD: she has failed atorvastatin and simvastatin due to causing gi distress and esophageal spasm. She is now on pravastatin 10 mg, ldl at goal    Hx of TIA: referred to neurology, on aspirin    Lung cancer: s/p VATS with Dr. Larsen. Now seeing oncology.     Vit d deficiency: on weekly ergocalciferol      Review of Systems  General: denies f/c, weight loss, night sweats, decreased appetite  Eye: denies blurred vision, changes in vision  Respiratory: denies sob, wheezing, cough  Cardiovascular: denies chest pain, palpitations, edema  Gastrointestinal: denies abdominal pain, n/v, constipation, diarrhea  Integumentary: denies  rashes, pruritis        Health Maintenance  Health Maintenance Topics with due status: Not Due       Topic Last Completion Date    TETANUS VACCINE 05/18/2020    DEXA Scan 12/23/2020    Colorectal Cancer Screening 02/10/2022    Mammogram 03/17/2022    Lipid Panel 05/30/2022    Influenza Vaccine Not Due     Health Maintenance Due   Topic Date Due    Hepatitis C Screening  Never done    Pneumococcal Vaccines (Age 65+) (1 - PCV) Never done    Shingles Vaccine (1 of 2) Never done    COVID-19 Vaccine (3 - Booster for Moderna series) 02/23/2022       Exam:  Vitals:    06/06/22 1435   BP: 115/70   Pulse: 80   Resp: 18   Temp: 97.2 °F (36.2 °C)     Weight: 74.2 kg (163 lb 9.3 oz)   Body mass index is 26.93 kg/m².      Physical Exam    Constitutional: NAD, alert, pleasant  Respiratory: CTAB, no wheezes, rales or rhonchi. No accessory muscle use  Eyes: EOMI  Cardiovascular: RRR, No m/r/g. No JVD. No LE edema  Gastrointestinal: BS+, nontender, nondistended  Integumentary: warm, dry, intact  Psych: AA&Ox3  MSK: normal gait. 5/5 bl le strength. DTR 2+ right patellar reflexes. NO lumbar step offs or tenderness    1. Primary hypertension  Overview:  At goal on current meds    Assessment & Plan:  continue current meds, low salt diet, weight loss and exercise  Avoid drinking too much caffeine  Call me with pressure more than 140/90        2. Anxiety  Overview:  On effexor and xanax      Assessment & Plan:  Continue effexor and xanax.  reviewed      3. Major depression in complete remission  Overview:  On effexor and xanax. Patient has worsening depression and thinks she needs a stronger dose of effexor. Denies si/hi.       Assessment & Plan:  Increase effexor and continue xanax    Orders:  -     venlafaxine (EFFEXOR-XR) 75 MG 24 hr capsule    4. Mixed hyperlipidemia  Overview:  On pravastatin, doing well    Assessment & Plan:  Continue pravastatin      5. Postoperative hypothyroidism  Overview:  s/p thyroidectomy, on  synthroid. She does see  Risk        Assessment & Plan:  Continue current dose of synthroid      6. Mukherjee's esophagus without dysplasia  Overview:  Sees gi. On PPI    Assessment & Plan:  Continue f/u with gi and PPI      7. Lumbar radiculopathy, right  Comments:  xr today but will need an mri  Orders:  -     X-Ray Lumbar Spine AP And Lateral    8. Weakness of right lower extremity       Follow up: No follow-ups on file.      Care Plan/Goals: Reviewed   Goals    None

## 2022-06-07 DIAGNOSIS — M54.16 LUMBAR RADICULOPATHY: ICD-10-CM

## 2022-06-07 DIAGNOSIS — R20.2 PARESTHESIA AND PAIN OF RIGHT EXTREMITY: Primary | ICD-10-CM

## 2022-06-07 DIAGNOSIS — M79.609 PARESTHESIA AND PAIN OF RIGHT EXTREMITY: Primary | ICD-10-CM

## 2022-06-22 ENCOUNTER — OFFICE VISIT (OUTPATIENT)
Dept: HEMATOLOGY/ONCOLOGY | Facility: CLINIC | Age: 67
End: 2022-06-22
Payer: COMMERCIAL

## 2022-06-22 VITALS — HEIGHT: 65 IN | BODY MASS INDEX: 27.16 KG/M2 | WEIGHT: 163 LBS

## 2022-06-22 DIAGNOSIS — E03.9 HYPOTHYROIDISM, UNSPECIFIED TYPE: ICD-10-CM

## 2022-06-22 DIAGNOSIS — C34.90 MALIGNANT NEOPLASM OF LUNG, UNSPECIFIED LATERALITY, UNSPECIFIED PART OF LUNG: Primary | ICD-10-CM

## 2022-06-22 PROCEDURE — 1159F PR MEDICATION LIST DOCUMENTED IN MEDICAL RECORD: ICD-10-PCS | Mod: CPTII,95,, | Performed by: INTERNAL MEDICINE

## 2022-06-22 PROCEDURE — 3288F FALL RISK ASSESSMENT DOCD: CPT | Mod: CPTII,95,, | Performed by: INTERNAL MEDICINE

## 2022-06-22 PROCEDURE — 3288F PR FALLS RISK ASSESSMENT DOCUMENTED: ICD-10-PCS | Mod: CPTII,95,, | Performed by: INTERNAL MEDICINE

## 2022-06-22 PROCEDURE — 3008F PR BODY MASS INDEX (BMI) DOCUMENTED: ICD-10-PCS | Mod: CPTII,95,, | Performed by: INTERNAL MEDICINE

## 2022-06-22 PROCEDURE — 99212 PR OFFICE/OUTPT VISIT, EST, LEVL II, 10-19 MIN: ICD-10-PCS | Mod: 95,,, | Performed by: INTERNAL MEDICINE

## 2022-06-22 PROCEDURE — 99212 OFFICE O/P EST SF 10 MIN: CPT | Mod: 95,,, | Performed by: INTERNAL MEDICINE

## 2022-06-22 PROCEDURE — 3008F BODY MASS INDEX DOCD: CPT | Mod: CPTII,95,, | Performed by: INTERNAL MEDICINE

## 2022-06-22 PROCEDURE — 1159F MED LIST DOCD IN RCRD: CPT | Mod: CPTII,95,, | Performed by: INTERNAL MEDICINE

## 2022-06-22 PROCEDURE — 1101F PT FALLS ASSESS-DOCD LE1/YR: CPT | Mod: CPTII,95,, | Performed by: INTERNAL MEDICINE

## 2022-06-22 PROCEDURE — 1101F PR PT FALLS ASSESS DOC 0-1 FALLS W/OUT INJ PAST YR: ICD-10-PCS | Mod: CPTII,95,, | Performed by: INTERNAL MEDICINE

## 2022-06-22 RX ORDER — PANTOPRAZOLE SODIUM 40 MG/1
40 TABLET, DELAYED RELEASE ORAL DAILY
COMMUNITY
Start: 2022-06-10

## 2022-06-22 NOTE — PROGRESS NOTES
Established Patient - Audio Only Telehealth Visit     The patient location is: Her home  The chief complaint leading to consultation is:Lung cancer and hypothyroidism  Visit type: Virtual visit with audio only (telephone)  Total time spent with patient: 10     The reason for the audio only service rather than synchronous audio and video virtual visit was related to technical difficulties or patient preference/necessity.     Each patient to whom I provide medical services by telemedicine is:  (1) informed of the relationship between the physician and patient and the respective role of any other health care provider with respect to management of the patient; and (2) notified that they may decline to receive medical services by telemedicine and may withdraw from such care at any time. Patient verbally consented to receive this service via voice-only telephone call.       HPI: Pt followed here for lung cancer.   She was recently seen with a complaint of headaches, and an MRI showed sinusitis.  She was given a course of antibiotics, but she is still having headaches.  No intracranial mets.  She has had some abnormal TFT's, and these were recently repeated.  She is on 88 mcg of synthroid.      Assessment and plan:Continue same dose of synthroid for normal TFT's.  Keep scheduled follow up in 10/22.                     This service was not originating from a related E/M service provided within the previous 7 days nor will  to an E/M service or procedure within the next 24 hours or my soonest available appointment.  Prevailing standard of care was able to be met in this audio-only visit.        Answers for HPI/ROS submitted by the patient on 6/20/2022  appetite change : No  unexpected weight change: No  mouth sores: No  visual disturbance: No  cough: No  shortness of breath: No  chest pain: No  abdominal pain: No  diarrhea: No  frequency: Yes  back pain: Yes  rash: No  headaches: Yes  adenopathy: No  nervous/ anxious:  Yes

## 2022-07-06 ENCOUNTER — HOSPITAL ENCOUNTER (OUTPATIENT)
Dept: RADIOLOGY | Facility: HOSPITAL | Age: 67
Discharge: HOME | End: 2022-07-06
Attending: FAMILY MEDICINE
Payer: COMMERCIAL

## 2022-07-06 DIAGNOSIS — R20.2 PARESTHESIA AND PAIN OF RIGHT EXTREMITY: ICD-10-CM

## 2022-07-06 DIAGNOSIS — M79.609 PARESTHESIA AND PAIN OF RIGHT EXTREMITY: ICD-10-CM

## 2022-07-06 DIAGNOSIS — M54.16 LUMBAR RADICULOPATHY: ICD-10-CM

## 2022-07-06 PROCEDURE — 72148 MRI LUMBAR SPINE W/O DYE: CPT | Mod: TC

## 2022-07-07 NOTE — PROGRESS NOTES
MRI lumbar spine reviewed and shows lumbar stenosis which is narrowing of the spinal canal which is likely cause of symptoms she is experiencing, tx includes physical therapy and/or referral to spinal specialist for lumber spinal steroid injections. Please let me know how patient would like to proceed and who to send referral to

## 2022-07-12 ENCOUNTER — TELEPHONE (OUTPATIENT)
Dept: FAMILY MEDICINE | Facility: CLINIC | Age: 67
End: 2022-07-12
Payer: COMMERCIAL

## 2022-07-12 DIAGNOSIS — M54.50 ACUTE MIDLINE LOW BACK PAIN, UNSPECIFIED WHETHER SCIATICA PRESENT: Primary | ICD-10-CM

## 2022-07-12 NOTE — TELEPHONE ENCOUNTER
Patient would like an external referral to Dr Flaquita To at [facility] for pain management/LESI consult.

## 2022-07-12 NOTE — TELEPHONE ENCOUNTER
----- Message from ROSEY Quinonez sent at 7/7/2022 11:47 AM CDT -----  MRI lumbar spine reviewed and shows lumbar stenosis which is narrowing of the spinal canal which is likely cause of symptoms she is experiencing, tx includes physical therapy and/or referral to spinal specialist for lumber spinal steroid injections. Please let me know how patient would like to proceed and who to send referral to

## 2022-08-17 ENCOUNTER — TELEPHONE (OUTPATIENT)
Dept: HEMATOLOGY/ONCOLOGY | Facility: CLINIC | Age: 67
End: 2022-08-17
Payer: COMMERCIAL

## 2022-08-17 NOTE — TELEPHONE ENCOUNTER
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 States does not feel it will be safe to be in car with customer d/t immune system. Requesting letter exempting from performing road test at work. Please advise.--SC, LPN

## 2022-08-17 NOTE — LETTER
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    If you have any questions or concerns, please don't hesitate to call.    Sincerely,            Marium Goins, NP

## 2022-08-17 NOTE — TELEPHONE ENCOUNTER
Go ahead and write up a note and I will sign.   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 If any questions, feel free to contact our office. Kind regards,  Thanks, JIM, SANDOVALC

## 2022-09-17 ENCOUNTER — HISTORICAL (OUTPATIENT)
Dept: ADMINISTRATIVE | Facility: HOSPITAL | Age: 67
End: 2022-09-17
Payer: COMMERCIAL

## 2022-09-30 ENCOUNTER — HOSPITAL ENCOUNTER (OUTPATIENT)
Dept: RADIOLOGY | Facility: HOSPITAL | Age: 67
Discharge: HOME | End: 2022-09-30
Attending: INTERNAL MEDICINE
Payer: COMMERCIAL

## 2022-09-30 DIAGNOSIS — C34.90 MALIGNANT NEOPLASM OF UNSPECIFIED PART OF UNSPECIFIED BRONCHUS OR LUNG: ICD-10-CM

## 2022-09-30 PROCEDURE — 71250 CT THORAX DX C-: CPT | Mod: TC

## 2022-10-17 NOTE — PROGRESS NOTES
[facility]    PATIENT: Jacklyn Appiah  MRN: [id number]  DATE: 10/18/2022      Diagnosis:   1. Malignant neoplasm of unspecified part of unspecified bronchus or lung    2. Hypothyroidism, unspecified type        Chief Complaint: Pt is fatigued, particularly in the afternoon.  Had COVID 2 months ago.         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 Appiah is a very pleasant 66-year-old white female with a history of thyroid cancer in 2020 and cervical cancer treated with hysterectomy in 1979. She underwent low-dose CT lung cancer screening 12/2020 which revealed a 2.2 cm left upper lobe mass. She is essentially asymptomatic. She was seen by pulmonologist, Dr. Fox Aviles who ordered a 1/6/21 PET/CT which revealed a spiculated left upper lobe nodule measuring up to about 22 mm. The maximum SUV is 7.9. MRI of brain was normal. She was referred to Dr. Larsen who performed a VATs wedge resection for biopst followed by a lobectomy on 3/19/21.  She has a long history of tobacco use.     Pathology/Staging    Resection 3/19/21 left thoracotomy with LEOBARDO Lobectomy by Dr. Larsen    Histologic Type: Invasive adenocarcinoma, acinar predominant    Total Tumor Size (size of entire tumor): 2.7 Centimeters (cm)  Visceral Pleura Invasion: Present  Margins: All margins are uninvolved by tumor  Lymph Nodes Involved: 0/8 +  Pathologic Stage Classification (pTNM, AJCC 8th Edition): Stage IB  Primary Tumor (pT): pT2a  Regional Lymph Nodes (pN): pN0    Interval History:  8/13/21: EGFR negative so patient not a candidate for adjuvant Tagrisso. F/U visits will follow NCCN surveillance guidelines with H&P, Chest CT + contrast q 6 months for 2 years, then, H&P with LD non-contrast-enhanced chest CT  annually.    10/13/21: Jacklyn returns for follow up of stage I lung cancer, s/p resection and 4 cycles of carbo/alimta. Pt had a CT scan which shows a stable chest node and stable adrenal nodularities. She is noting some rectal bleeding without change in stool, and she is seeing GI for that. She also notes oral lesions which are similar to episodes of lichens planus. Otherwise feeling well. She quite smoking at the time of her surgery and did not resume smoking. She has had 2 doses of the Moderna vaccine.    4/22/22: Jacklyn returns for follow up of stage I lung cancer. She does not feel well. She notes that she has a headache for 3 weeks and nausea for 2 weeks. This is not typical for her usual migraines. She also notes fairly extreme fatigue. She states that she sleeps most of the time she is not working. She also notes some right hip pain which has been present for 2 months and this is associated with some numbness in the right anterior thigh whenever she walks. She was noted to have a TSH of 10 back in 6/21, but her dose was not adjusted.    Assessment/Plan 1. Adenocarcinoma of left lung C34.92 Pt with history of lung cancer. I am concerned about her HA, nausea, fatigue, and RUE weakness. Will get MRI of the brain. I will also get hip films and a bone scan because of her new onset R hip pain. F/U after above.   2. Hypothyroidism E03.9 Will repeat thyroid studies and adjust levothyroxine accordingly.      Subjective:    Interval History: Ms. Appiah returns for follow up.  Her main complaint is fatigue, which has been present since COVID 2 months ago.  Her CT shows no recurrence.  Labs remarkable for a TSH of 6.  She also has a non-healing anal fissure. She has seen Dr. Friend for that. Her cough and back  pain are stable.     Past Medical History:   Past Medical History:   Diagnosis Date    Acute anal fissure     Adenocarcinoma of left lung     Anxiety disorder, unspecified     Mukherjee's esophagus     Bilateral  stenosis of carotid arteries greater than 50%     Celiac disease     Chronic cerebral ischemia     Esophageal spasm     Family history of breast cancer in mother     GERD (gastroesophageal reflux disease)     Heart attack     Heart disease     History of cervical cancer     History of lobectomy of lung     History of TIA (transient ischemic attack)     Hyperlipidemia     Hypertension     Hypothyroidism, postsurgical     Irregular heart beat     Long-term use of high-risk medication     Lumbar radiculopathy     Lumbar spinal stenosis     Lung mass     Major depression in complete remission     Migraine with aura     Osteopenia     Osteopenia after menopause     Painful defecation     Panic disorder (episodic paroxysmal anxiety)     Rectal bleeding     Restless leg syndrome     Seizures     Spinal stenosis of lumbar region with radiculopathy     Statin-induced myositis     Stroke     Thyroid disease     Vitamin D deficiency        Past Surgical HIstory:   Past Surgical History:   Procedure Laterality Date    APPENDECTOMY  1992    CERVICAL BIOPSY  1979    CHOLECYSTECTOMY  1992    COLONOSCOPY  10/15/2018    Dr Leander Rizo    COLONOSCOPY W/ BIOPSIES AND POLYPECTOMY  02/10/2022    Dr Boo Friend    EGD, WITH BALLOON DILATION  12/21/2020    Dr Leander Rizo    ESOPHAGOGASTRODUODENOSCOPY  05/18/2016    Dr Leander Rizo    THORACOSCOPY  03/19/2021    Dr Marie All    TONSILLECTOMY, ADENOIDECTOMY  1968    TOTAL ABDOMINAL HYSTERECTOMY  1988    TOTAL THYROIDECTOMY  05/2000    TOTAL THYROIDECTOMY  04/2000       Family History:   Family History   Problem Relation Age of Onset    Breast cancer Mother     Diabetes Mellitus Mother     Heart disease Mother     Hyperlipidemia Mother     Hypertension Mother     Kidney disease Mother     Stroke Mother     Alzheimer's disease Father     Heart attack Brother     Heart disease Maternal Grandmother     Lung cancer Maternal Grandfather     Breast cancer Paternal Grandmother         Social History:  reports that she has quit smoking. She has never used smokeless tobacco. She reports that she does not drink alcohol and does not use drugs.    Allergies:  Review of patient's allergies indicates:   Allergen Reactions    Gluten Rash       Medications:  Current Outpatient Medications   Medication Sig Dispense Refill    ALPRAZolam (XANAX) 0.5 MG tablet Take 0.5 mg by mouth nightly.      aspirin (ECOTRIN) 81 MG EC tablet Take 81 mg by mouth once daily.      diltiaZEM (CARDIZEM CD) 120 MG Cp24 Take 120 mg by mouth once daily.      ergocalciferol (ERGOCALCIFEROL) 50,000 unit Cap Take 50,000 Units by mouth every 7 days.      famotidine (PEPCID) 40 MG tablet Take 1 tablet by mouth every evening.      fexofenadine (ALLEGRA) 180 MG tablet       L.acid-B.animalis,bifid,infant (FORTIFY OPTIMA PROBIOTIC) 50 billion cell CpDR Take by mouth.      levothyroxine (SYNTHROID) 88 MCG tablet Take 88 mcg by mouth once daily at 6am.      metoprolol succinate (TOPROL-XL) 25 MG 24 hr tablet Take 12.5 mg by mouth nightly.      mycophenolate (CELLCEPT) 500 mg Tab       ondansetron (ZOFRAN) 8 MG tablet Take 1 tablet by mouth once daily.      pantoprazole (PROTONIX) 40 MG tablet Take 40 mg by mouth once daily.      pravastatin (PRAVACHOL) 10 MG tablet Take 10 mg by mouth once daily.      venlafaxine (EFFEXOR-XR) 75 MG 24 hr capsule Take 1 capsule (75 mg total) by mouth once daily. 30 capsule 11    atorvastatin (LIPITOR) 20 MG tablet Take 1 tablet by mouth once daily at 6am.      calcium/magnesium (CALCIUM AND MAGNESIUM ORAL)       folic acid (FOLVITE) 1 MG tablet Take 1 tablet by mouth once daily.      levothyroxine (SYNTHROID) 100 MCG tablet Take 1 tablet (100 mcg total) by mouth before breakfast. 30 tablet 11     No current facility-administered medications for this visit.       Review of Systems   Constitutional:  Negative for appetite change and unexpected weight change.   Respiratory:  Positive for cough and  "shortness of breath.    Gastrointestinal:  Negative for diarrhea.   Musculoskeletal:  Positive for back pain.   Skin:  Positive for rash.   Neurological:  Positive for headaches.   Hematological:  Negative for adenopathy.   Psychiatric/Behavioral:  The patient is nervous/anxious.      Objective:      Vitals:   Vitals:    10/18/22 1348   BP: 117/75   BP Location: Right arm   Patient Position: Sitting   BP Method: Medium (Automatic)   Pulse: 91   Resp: 20   Temp: 97.5 °F (36.4 °C)   TempSrc: Oral   SpO2: 100%   Weight: 72.3 kg (159 lb 4.8 oz)   Height: 5' 5.35" (1.66 m)     BMI: Body mass index is 26.23 kg/m².    Physical Exam  Vitals reviewed.   Constitutional:       Appearance: Normal appearance.   Cardiovascular:      Rate and Rhythm: Normal rate and regular rhythm.   Pulmonary:      Effort: Pulmonary effort is normal.      Breath sounds: Normal breath sounds.   Abdominal:      General: Abdomen is flat. Bowel sounds are normal.      Palpations: Abdomen is soft.   Musculoskeletal:         General: Normal range of motion.      Cervical back: Neck supple.   Neurological:      General: No focal deficit present.      Mental Status: She is alert.   Psychiatric:         Mood and Affect: Mood normal.         Behavior: Behavior normal.     Laboratory Data:      Imaging:   Assessment/Plan:       1. Malignant neoplasm of unspecified part of unspecified bronchus or lung    2. Hypothyroidism, unspecified type      Lung cancer resected, WILFREDO  Hypothyroidism, will increase synthroid to 100 mcg q d and recheck lab in 6 weeks.   F/U with scan and labs in 6 months.     Suspect afternoon fatigue is related to COVID.           Carolina Wilcox MD      "

## 2022-10-18 ENCOUNTER — OFFICE VISIT (OUTPATIENT)
Dept: HEMATOLOGY/ONCOLOGY | Facility: CLINIC | Age: 67
End: 2022-10-18
Payer: COMMERCIAL

## 2022-10-18 VITALS
RESPIRATION RATE: 20 BRPM | DIASTOLIC BLOOD PRESSURE: 75 MMHG | HEART RATE: 91 BPM | HEIGHT: 65 IN | WEIGHT: 159.31 LBS | OXYGEN SATURATION: 100 % | BODY MASS INDEX: 26.54 KG/M2 | SYSTOLIC BLOOD PRESSURE: 117 MMHG | TEMPERATURE: 98 F

## 2022-10-18 DIAGNOSIS — C34.90 MALIGNANT NEOPLASM OF UNSPECIFIED PART OF UNSPECIFIED BRONCHUS OR LUNG: Primary | ICD-10-CM

## 2022-10-18 DIAGNOSIS — E03.9 HYPOTHYROIDISM, UNSPECIFIED TYPE: ICD-10-CM

## 2022-10-18 PROCEDURE — 1159F MED LIST DOCD IN RCRD: CPT | Mod: CPTII,,, | Performed by: INTERNAL MEDICINE

## 2022-10-18 PROCEDURE — 3078F PR MOST RECENT DIASTOLIC BLOOD PRESSURE < 80 MM HG: ICD-10-PCS | Mod: CPTII,,, | Performed by: INTERNAL MEDICINE

## 2022-10-18 PROCEDURE — 99213 OFFICE O/P EST LOW 20 MIN: CPT | Mod: ,,, | Performed by: INTERNAL MEDICINE

## 2022-10-18 PROCEDURE — 3078F DIAST BP <80 MM HG: CPT | Mod: CPTII,,, | Performed by: INTERNAL MEDICINE

## 2022-10-18 PROCEDURE — 1101F PR PT FALLS ASSESS DOC 0-1 FALLS W/OUT INJ PAST YR: ICD-10-PCS | Mod: CPTII,,, | Performed by: INTERNAL MEDICINE

## 2022-10-18 PROCEDURE — 3074F PR MOST RECENT SYSTOLIC BLOOD PRESSURE < 130 MM HG: ICD-10-PCS | Mod: CPTII,,, | Performed by: INTERNAL MEDICINE

## 2022-10-18 PROCEDURE — 3288F PR FALLS RISK ASSESSMENT DOCUMENTED: ICD-10-PCS | Mod: CPTII,,, | Performed by: INTERNAL MEDICINE

## 2022-10-18 PROCEDURE — 1159F PR MEDICATION LIST DOCUMENTED IN MEDICAL RECORD: ICD-10-PCS | Mod: CPTII,,, | Performed by: INTERNAL MEDICINE

## 2022-10-18 PROCEDURE — 3288F FALL RISK ASSESSMENT DOCD: CPT | Mod: CPTII,,, | Performed by: INTERNAL MEDICINE

## 2022-10-18 PROCEDURE — 1101F PT FALLS ASSESS-DOCD LE1/YR: CPT | Mod: CPTII,,, | Performed by: INTERNAL MEDICINE

## 2022-10-18 PROCEDURE — 99213 PR OFFICE/OUTPT VISIT, EST, LEVL III, 20-29 MIN: ICD-10-PCS | Mod: ,,, | Performed by: INTERNAL MEDICINE

## 2022-10-18 PROCEDURE — 1126F PR PAIN SEVERITY QUANTIFIED, NO PAIN PRESENT: ICD-10-PCS | Mod: CPTII,,, | Performed by: INTERNAL MEDICINE

## 2022-10-18 PROCEDURE — 1126F AMNT PAIN NOTED NONE PRSNT: CPT | Mod: CPTII,,, | Performed by: INTERNAL MEDICINE

## 2022-10-18 PROCEDURE — 3074F SYST BP LT 130 MM HG: CPT | Mod: CPTII,,, | Performed by: INTERNAL MEDICINE

## 2022-10-18 RX ORDER — L.ACID,GASS/B.ANIM,BIFID,INFAN 50B CELL
CAPSULE,DELAYED RELEASE (ENTERIC COATED) ORAL
COMMUNITY
End: 2023-02-07

## 2022-10-18 RX ORDER — MINERAL OIL
180 ENEMA (ML) RECTAL DAILY
COMMUNITY

## 2022-10-18 RX ORDER — MYCOPHENOLATE MOFETIL 500 MG/1
TABLET ORAL
COMMUNITY
Start: 2022-07-15 | End: 2023-02-07

## 2022-10-18 RX ORDER — LEVOTHYROXINE SODIUM 100 UG/1
100 TABLET ORAL
Qty: 30 TABLET | Refills: 11 | Status: SHIPPED | OUTPATIENT
Start: 2022-10-18 | End: 2024-03-20

## 2022-10-26 DIAGNOSIS — C34.90 MALIGNANT NEOPLASM OF LUNG, UNSPECIFIED LATERALITY, UNSPECIFIED PART OF LUNG: Primary | ICD-10-CM

## 2022-10-27 RX ORDER — ONDANSETRON HYDROCHLORIDE 8 MG/1
8 TABLET, FILM COATED ORAL EVERY 8 HOURS PRN
Qty: 30 TABLET | Refills: 1 | Status: SHIPPED | OUTPATIENT
Start: 2022-10-27

## 2022-11-28 ENCOUNTER — OFFICE VISIT (OUTPATIENT)
Dept: HEMATOLOGY/ONCOLOGY | Facility: CLINIC | Age: 67
End: 2022-11-28
Payer: COMMERCIAL

## 2022-11-28 VITALS — HEIGHT: 60 IN | BODY MASS INDEX: 31.22 KG/M2 | WEIGHT: 159 LBS

## 2022-11-28 DIAGNOSIS — C34.12 MALIGNANT NEOPLASM OF UPPER LOBE OF LEFT LUNG: Primary | ICD-10-CM

## 2022-11-28 DIAGNOSIS — E05.90 HYPERTHYROIDISM: ICD-10-CM

## 2022-11-28 PROCEDURE — 1126F PR PAIN SEVERITY QUANTIFIED, NO PAIN PRESENT: ICD-10-PCS | Mod: CPTII,95,, | Performed by: INTERNAL MEDICINE

## 2022-11-28 PROCEDURE — 3008F PR BODY MASS INDEX (BMI) DOCUMENTED: ICD-10-PCS | Mod: CPTII,95,, | Performed by: INTERNAL MEDICINE

## 2022-11-28 PROCEDURE — 99212 OFFICE O/P EST SF 10 MIN: CPT | Mod: 95,,, | Performed by: INTERNAL MEDICINE

## 2022-11-28 PROCEDURE — 1159F MED LIST DOCD IN RCRD: CPT | Mod: CPTII,95,, | Performed by: INTERNAL MEDICINE

## 2022-11-28 PROCEDURE — 99212 PR OFFICE/OUTPT VISIT, EST, LEVL II, 10-19 MIN: ICD-10-PCS | Mod: 95,,, | Performed by: INTERNAL MEDICINE

## 2022-11-28 PROCEDURE — 1159F PR MEDICATION LIST DOCUMENTED IN MEDICAL RECORD: ICD-10-PCS | Mod: CPTII,95,, | Performed by: INTERNAL MEDICINE

## 2022-11-28 PROCEDURE — 1126F AMNT PAIN NOTED NONE PRSNT: CPT | Mod: CPTII,95,, | Performed by: INTERNAL MEDICINE

## 2022-11-28 PROCEDURE — 3008F BODY MASS INDEX DOCD: CPT | Mod: CPTII,95,, | Performed by: INTERNAL MEDICINE

## 2022-11-28 PROCEDURE — 1101F PT FALLS ASSESS-DOCD LE1/YR: CPT | Mod: CPTII,95,, | Performed by: INTERNAL MEDICINE

## 2022-11-28 PROCEDURE — 3288F PR FALLS RISK ASSESSMENT DOCUMENTED: ICD-10-PCS | Mod: CPTII,95,, | Performed by: INTERNAL MEDICINE

## 2022-11-28 PROCEDURE — 1101F PR PT FALLS ASSESS DOC 0-1 FALLS W/OUT INJ PAST YR: ICD-10-PCS | Mod: CPTII,95,, | Performed by: INTERNAL MEDICINE

## 2022-11-28 PROCEDURE — 3288F FALL RISK ASSESSMENT DOCD: CPT | Mod: CPTII,95,, | Performed by: INTERNAL MEDICINE

## 2022-11-28 NOTE — PROGRESS NOTES
Established Patient - Audio Only Telehealth Visit     The patient location is: [place]  The chief complaint leading to consultation is: Lung cancer, hypothyroidism  Visit type: Virtual visit with audio only (telephone)  Total time spent with patient: 10 minutes       The reason for the audio only service rather than synchronous audio and video virtual visit was related to technical difficulties or patient preference/necessity.     Each patient to whom I provide medical services by telemedicine is:  (1) informed of the relationship between the physician and patient and the respective role of any other health care provider with respect to management of the patient; and (2) notified that they may decline to receive medical services by telemedicine and may withdraw from such care at any time. Patient verbally consented to receive this service via voice-only telephone call.       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 Appiah is a very pleasant 66-year-old white female with a history of thyroid cancer in 2020 and cervical cancer treated with hysterectomy in 1979. She underwent low-dose CT lung cancer screening 12/2020 which revealed a 2.2 cm left upper lobe mass. She is essentially asymptomatic. She was seen by pulmonologist, Dr. Fox Aviles who ordered a 1/6/21 PET/CT which revealed a spiculated left upper lobe nodule measuring up to about 22 mm. The maximum SUV is 7.9. MRI of brain was normal. She was referred to Dr. Larsen who performed a VATs wedge resection for biopst followed by a lobectomy on 3/19/21.  She has a long history of tobacco use.     Pathology/Staging    Resection 3/19/21 left thoracotomy with LEOBARDO Lobectomy by Dr. Larsen    Histologic Type: Invasive adenocarcinoma, acinar predominant    Total Tumor Size (size of entire  tumor): 2.7 Centimeters (cm)  Visceral Pleura Invasion: Present  Margins: All margins are uninvolved by tumor  Lymph Nodes Involved: 0/8 +  Pathologic Stage Classification (pTNM, AJCC 8th Edition): Stage IB  Primary Tumor (pT): pT2a  Regional Lymph Nodes (pN): pN0      Subjective:    Interval History: Ms. Appiah has a phone visit to follow up on her hypothyroidism. She was started on a new dose of levothyroxine at 100 mcg, with follow up labs.  She notes some GI symptoms which she thinks are related to a viral gastroenteritis.  Her TSH is 3.43.    Assessment and plan:  Lung cancer, WILFREDO                             Hypothyroidism, continue current dose of levothyroxine.                                         Keep scheduled follow up in 4/2023,                        This service was not originating from a related E/M service provided within the previous 7 days nor will  to an E/M service or procedure within the next 24 hours or my soonest available appointment.  Prevailing standard of care was able to be met in this audio-only visit.

## 2022-11-29 ENCOUNTER — LAB VISIT (OUTPATIENT)
Dept: LAB | Facility: HOSPITAL | Age: 67
End: 2022-11-29
Attending: FAMILY MEDICINE
Payer: COMMERCIAL

## 2022-11-29 DIAGNOSIS — E78.2 MIXED HYPERLIPIDEMIA: Primary | ICD-10-CM

## 2022-11-29 DIAGNOSIS — Z00.00 WELLNESS EXAMINATION: ICD-10-CM

## 2022-11-29 DIAGNOSIS — E78.2 MIXED HYPERLIPIDEMIA: ICD-10-CM

## 2022-11-29 LAB
APPEARANCE UR: CLEAR
BACTERIA #/AREA URNS AUTO: ABNORMAL /HPF
BILIRUB UR QL STRIP.AUTO: NEGATIVE MG/DL
CHOLEST SERPL-MCNC: 192 MG/DL
CHOLEST/HDLC SERPL: 3 {RATIO} (ref 0–5)
COLOR UR AUTO: YELLOW
GLUCOSE UR QL STRIP.AUTO: NEGATIVE MG/DL
HDLC SERPL-MCNC: 57 MG/DL (ref 35–60)
KETONES UR QL STRIP.AUTO: NEGATIVE MG/DL
LDLC SERPL CALC-MCNC: 100 MG/DL (ref 50–140)
LEUKOCYTE ESTERASE UR QL STRIP.AUTO: ABNORMAL UNIT/L
NITRITE UR QL STRIP.AUTO: NEGATIVE
PH UR STRIP.AUTO: 6 [PH]
PROT UR QL STRIP.AUTO: NEGATIVE MG/DL
RBC #/AREA URNS AUTO: ABNORMAL /HPF
RBC UR QL AUTO: NEGATIVE UNIT/L
SP GR UR STRIP.AUTO: 1.02
SQUAMOUS #/AREA URNS AUTO: ABNORMAL /HPF
TRIGL SERPL-MCNC: 174 MG/DL (ref 37–140)
UROBILINOGEN UR STRIP-ACNC: 0.2 MG/DL
VLDLC SERPL CALC-MCNC: 35 MG/DL
WBC #/AREA URNS AUTO: ABNORMAL /HPF

## 2022-11-29 PROCEDURE — 81001 URINALYSIS AUTO W/SCOPE: CPT

## 2022-11-29 PROCEDURE — 36415 COLL VENOUS BLD VENIPUNCTURE: CPT

## 2022-11-29 PROCEDURE — 81003 URINALYSIS AUTO W/O SCOPE: CPT

## 2022-11-29 PROCEDURE — 80061 LIPID PANEL: CPT

## 2022-12-07 ENCOUNTER — OFFICE VISIT (OUTPATIENT)
Dept: FAMILY MEDICINE | Facility: CLINIC | Age: 67
End: 2022-12-07
Payer: COMMERCIAL

## 2022-12-07 VITALS
RESPIRATION RATE: 16 BRPM | DIASTOLIC BLOOD PRESSURE: 83 MMHG | WEIGHT: 160.81 LBS | HEIGHT: 60 IN | BODY MASS INDEX: 31.57 KG/M2 | HEART RATE: 90 BPM | TEMPERATURE: 98 F | OXYGEN SATURATION: 99 % | SYSTOLIC BLOOD PRESSURE: 123 MMHG

## 2022-12-07 DIAGNOSIS — R30.0 DYSURIA: ICD-10-CM

## 2022-12-07 DIAGNOSIS — R35.0 URINARY FREQUENCY: ICD-10-CM

## 2022-12-07 DIAGNOSIS — Z00.00 ROUTINE GENERAL MEDICAL EXAMINATION AT A HEALTH CARE FACILITY: Primary | ICD-10-CM

## 2022-12-07 DIAGNOSIS — Z90.2 HISTORY OF LOBECTOMY OF LUNG: ICD-10-CM

## 2022-12-07 DIAGNOSIS — K59.00 CONSTIPATION, UNSPECIFIED CONSTIPATION TYPE: ICD-10-CM

## 2022-12-07 PROBLEM — I49.9 IRREGULAR HEART RHYTHM: Status: ACTIVE | Noted: 2022-12-07

## 2022-12-07 PROBLEM — M85.80 OSTEOPENIA AFTER MENOPAUSE: Status: ACTIVE | Noted: 2022-12-07

## 2022-12-07 PROBLEM — R91.8 LUNG MASS: Status: ACTIVE | Noted: 2022-12-07

## 2022-12-07 PROBLEM — F32.A DEPRESSIVE DISORDER: Status: ACTIVE | Noted: 2022-12-07

## 2022-12-07 PROBLEM — Z78.0 OSTEOPENIA AFTER MENOPAUSE: Status: ACTIVE | Noted: 2022-12-07

## 2022-12-07 PROCEDURE — 3079F DIAST BP 80-89 MM HG: CPT | Mod: CPTII,,, | Performed by: FAMILY MEDICINE

## 2022-12-07 PROCEDURE — 3008F BODY MASS INDEX DOCD: CPT | Mod: CPTII,,, | Performed by: FAMILY MEDICINE

## 2022-12-07 PROCEDURE — 1160F PR REVIEW ALL MEDS BY PRESCRIBER/CLIN PHARMACIST DOCUMENTED: ICD-10-PCS | Mod: CPTII,,, | Performed by: FAMILY MEDICINE

## 2022-12-07 PROCEDURE — 1159F PR MEDICATION LIST DOCUMENTED IN MEDICAL RECORD: ICD-10-PCS | Mod: CPTII,,, | Performed by: FAMILY MEDICINE

## 2022-12-07 PROCEDURE — 99397 PER PM REEVAL EST PAT 65+ YR: CPT | Mod: ,,, | Performed by: FAMILY MEDICINE

## 2022-12-07 PROCEDURE — 1126F AMNT PAIN NOTED NONE PRSNT: CPT | Mod: CPTII,,, | Performed by: FAMILY MEDICINE

## 2022-12-07 PROCEDURE — 1160F RVW MEDS BY RX/DR IN RCRD: CPT | Mod: CPTII,,, | Performed by: FAMILY MEDICINE

## 2022-12-07 PROCEDURE — 1126F PR PAIN SEVERITY QUANTIFIED, NO PAIN PRESENT: ICD-10-PCS | Mod: CPTII,,, | Performed by: FAMILY MEDICINE

## 2022-12-07 PROCEDURE — 99397 PR PREVENTIVE VISIT,EST,65 & OVER: ICD-10-PCS | Mod: ,,, | Performed by: FAMILY MEDICINE

## 2022-12-07 PROCEDURE — 1159F MED LIST DOCD IN RCRD: CPT | Mod: CPTII,,, | Performed by: FAMILY MEDICINE

## 2022-12-07 PROCEDURE — 3079F PR MOST RECENT DIASTOLIC BLOOD PRESSURE 80-89 MM HG: ICD-10-PCS | Mod: CPTII,,, | Performed by: FAMILY MEDICINE

## 2022-12-07 PROCEDURE — 3008F PR BODY MASS INDEX (BMI) DOCUMENTED: ICD-10-PCS | Mod: CPTII,,, | Performed by: FAMILY MEDICINE

## 2022-12-07 PROCEDURE — 3074F PR MOST RECENT SYSTOLIC BLOOD PRESSURE < 130 MM HG: ICD-10-PCS | Mod: CPTII,,, | Performed by: FAMILY MEDICINE

## 2022-12-07 PROCEDURE — 3074F SYST BP LT 130 MM HG: CPT | Mod: CPTII,,, | Performed by: FAMILY MEDICINE

## 2022-12-07 RX ORDER — FLUCONAZOLE 150 MG/1
150 TABLET ORAL DAILY
Qty: 1 TABLET | Refills: 0 | Status: SHIPPED | OUTPATIENT
Start: 2022-12-07 | End: 2022-12-08

## 2022-12-07 NOTE — PROGRESS NOTES
Patient ID: [id number]     Chief Complaint: Annual Exam        HPI:     Jacklyn Appiah is a 67 y.o. female here today for an annual wellness visit. Reviewed and discussed lab results. Overall she feels well.  States she frequently experiences palpitations ever since she had thyroid cancer first diagnosed. Hx also significant for cervical cancer and lung cancer.         ----------------------------  Acute anal fissure  Adenocarcinoma of left lung  Anxiety disorder, unspecified  Mukherjee's esophagus  Bilateral stenosis of carotid arteries greater than 50%  Celiac disease  Chronic cerebral ischemia  Esophageal spasm  Family history of breast cancer in mother  GERD (gastroesophageal reflux disease)  Heart attack  Heart disease  History of cervical cancer  History of lobectomy of lung  History of TIA (transient ischemic attack)  Hyperlipidemia  Hypertension  Hypothyroidism, postsurgical  Irregular heart beat  Long-term use of high-risk medication  Lumbar radiculopathy  Lumbar spinal stenosis  Lung mass  Major depression in complete remission  Migraine with aura  Osteopenia  Osteopenia after menopause  Painful defecation  Panic disorder (episodic paroxysmal anxiety)  Rectal bleeding  Restless leg syndrome  Seizures  Spinal stenosis of lumbar region with radiculopathy  Statin-induced myositis  Stroke  Thyroid disease  Vitamin D deficiency     Past Surgical History:   Procedure Laterality Date    APPENDECTOMY  1992    CERVICAL BIOPSY  1979    CHOLECYSTECTOMY  1992    COLONOSCOPY  10/15/2018    Dr Leander Rizo    COLONOSCOPY W/ BIOPSIES AND POLYPECTOMY  02/10/2022    Dr Boo Friend    EGD, WITH BALLOON DILATION  12/21/2020    Dr Leander Rizo    ESOPHAGOGASTRODUODENOSCOPY  05/18/2016    Dr Leander Rizo    THORACOSCOPY  03/19/2021    Dr Frank Larsen    TONSILLECTOMY, ADENOIDECTOMY  1968    TOTAL ABDOMINAL HYSTERECTOMY  1988    TOTAL THYROIDECTOMY  05/2000    TOTAL THYROIDECTOMY  04/2000       Review of patient's  allergies indicates:   Allergen Reactions    Gluten Rash       Outpatient Medications Marked as Taking for the 22 encounter (Office Visit) with Fransico Zavala,    Medication Sig Dispense Refill    ALPRAZolam (XANAX) 0.5 MG tablet Take 0.5 mg by mouth nightly.      aspirin (ECOTRIN) 81 MG EC tablet Take 81 mg by mouth once daily.      calcium/magnesium (CALCIUM AND MAGNESIUM ORAL)       diltiaZEM (CARDIZEM CD) 120 MG Cp24 Take 120 mg by mouth once daily.      ergocalciferol (ERGOCALCIFEROL) 50,000 unit Cap Take 50,000 Units by mouth every 7 days.      famotidine (PEPCID) 40 MG tablet Take 1 tablet by mouth every evening.      fexofenadine (ALLEGRA) 180 MG tablet       L.acid-B.animalis,bifid,infant (FORTIFY OPTIMA PROBIOTIC) 50 billion cell CpDR Take by mouth.      levothyroxine (SYNTHROID) 100 MCG tablet Take 1 tablet (100 mcg total) by mouth before breakfast. 30 tablet 11    metoprolol succinate (TOPROL-XL) 25 MG 24 hr tablet Take 12.5 mg by mouth nightly.      mycophenolate (CELLCEPT) 500 mg Tab       ondansetron (ZOFRAN) 8 MG tablet Take 1 tablet (8 mg total) by mouth every 8 (eight) hours as needed for Nausea. 30 tablet 1    pantoprazole (PROTONIX) 40 MG tablet Take 40 mg by mouth once daily.      pravastatin (PRAVACHOL) 10 MG tablet Take 10 mg by mouth once daily.      venlafaxine (EFFEXOR-XR) 75 MG 24 hr capsule Take 1 capsule (75 mg total) by mouth once daily. 30 capsule 11       Social History     Socioeconomic History    Marital status:    Tobacco Use    Smoking status: Former     Packs/day: 1.50     Years: 15.00     Pack years: 22.50     Types: Cigarettes     Quit date: 3/18/2021     Years since quittin.7    Smokeless tobacco: Never   Substance and Sexual Activity    Alcohol use: Never    Drug use: Never    Sexual activity: Not Currently     Birth control/protection: Post-menopausal, See Surgical Hx        Family History   Problem Relation Age of Onset    Breast cancer Mother      "Diabetes Mellitus Mother     Heart disease Mother     Hyperlipidemia Mother     Hypertension Mother     Kidney disease Mother     Stroke Mother     COPD Mother     Depression Mother     Diabetes Mother     Mental illness Mother     Alzheimer's disease Father     Heart attack Brother     Heart disease Brother     Heart disease Maternal Grandmother     Lung cancer Maternal Grandfather     Breast cancer Paternal Grandmother         Patient Care Team:  Fransico Zavala DO as PCP - General (Family Medicine)  Leander Norman MD as Consulting Physician (Cardiology)  Carolina Wilcox MD as Consulting Physician (Hematology and Oncology)  Ernie Kitchen MD as Consulting Physician (Endocrinology)  Jena Larsen MD as Consulting Physician (Cardiothoracic Surgery)  Boo Friend MD as Consulting Physician (Gastroenterology)  Usha Felder MD as Consulting Physician (Medical Oncology)  Harry Ewing MD as Consulting Physician (Neurology)       Subjective:     Review of Systems   Constitutional:  Negative for chills and fever.   Respiratory:  Positive for cough and shortness of breath. Negative for sputum production and wheezing.    Cardiovascular:  Positive for palpitations. Negative for chest pain.   Gastrointestinal:  Positive for abdominal pain, blood in stool, constipation and nausea. Negative for diarrhea, heartburn and melena.   Genitourinary:  Positive for dysuria.   Musculoskeletal:  Positive for back pain and myalgias. Negative for falls.   Neurological:  Positive for dizziness. Negative for tingling and headaches.     See HPI for details      All Other ROS: Negative except as stated in HPI.       Objective:     /83   Pulse 90   Temp 97.9 °F (36.6 °C) (Tympanic)   Resp 16   Ht 5' 0.24" (1.53 m)   Wt 72.9 kg (160 lb 12.8 oz)   SpO2 99%   BMI 31.16 kg/m²     Physical Exam  Vitals reviewed.   Constitutional:       General: She is not in acute distress.     Appearance: Normal appearance. "   Cardiovascular:      Rate and Rhythm: Normal rate and regular rhythm.      Heart sounds: No murmur heard.    No friction rub. No gallop.   Pulmonary:      Effort: No respiratory distress.      Breath sounds: No wheezing, rhonchi or rales.   Musculoskeletal:         General: No swelling, tenderness or deformity.      Right lower leg: No edema.      Left lower leg: No edema.   Skin:     General: Skin is warm and dry.      Findings: No lesion or rash.   Neurological:      General: No focal deficit present.      Mental Status: She is alert.   Psychiatric:         Mood and Affect: Mood normal.       Assessment:       ICD-10-CM ICD-9-CM   1. Routine general medical examination at a health care facility  Z00.00 V70.0   2. Constipation, unspecified constipation type  K59.00 564.00   3. Dysuria  R30.0 788.1   4. Urinary frequency  R35.0 788.41   5. History of lobectomy of lung  Z90.2 V12.59        Plan:         Health Maintenance Topics with due status: Not Due       Topic Last Completion Date    TETANUS VACCINE 05/18/2020    DEXA Scan 12/23/2020    Colorectal Cancer Screening 02/10/2022    Mammogram 03/17/2022    Lipid Panel 11/29/2022            Breast Cancer Screening - Next due 3/17/23  Colon Cancer Screening - Next due 2/10/32  Osteoporosis Screening - Next due on 12/23/23.     Eye Exam - Recommend annually.    Dental Exam - Recommend biannual exams.     Vaccinations -   Immunization History   Administered Date(s) Administered    COVID-19, MRNA, LN-S, PF (MODERNA FULL 0.5 ML DOSE) 08/26/2021, 09/23/2021    Tdap 05/18/2020            1. Routine general medical examination at a health care facility    2. Constipation, unspecified constipation type    3. Dysuria  - fluconazole (DIFLUCAN) 150 MG Tab; Take 1 tablet (150 mg total) by mouth once daily. for 1 day  Dispense: 1 tablet; Refill: 0    4. Urinary frequency    5. History of lobectomy of lung      Medication List with Changes/Refills   New Medications    FLUCONAZOLE  (DIFLUCAN) 150 MG TAB    Take 1 tablet (150 mg total) by mouth once daily. for 1 day       Start Date: 12/7/2022 End Date: 12/8/2022   Current Medications    ALPRAZOLAM (XANAX) 0.5 MG TABLET    Take 0.5 mg by mouth nightly.       Start Date: 5/12/2022 End Date: --    ASPIRIN (ECOTRIN) 81 MG EC TABLET    Take 81 mg by mouth once daily.       Start Date: --        End Date: --    CALCIUM/MAGNESIUM (CALCIUM AND MAGNESIUM ORAL)           Start Date: --        End Date: --    DILTIAZEM (CARDIZEM CD) 120 MG CP24    Take 120 mg by mouth once daily.       Start Date: 4/12/2022 End Date: --    ERGOCALCIFEROL (ERGOCALCIFEROL) 50,000 UNIT CAP    Take 50,000 Units by mouth every 7 days.       Start Date: 4/25/2022 End Date: --    FAMOTIDINE (PEPCID) 40 MG TABLET    Take 1 tablet by mouth every evening.       Start Date: --        End Date: --    FEXOFENADINE (ALLEGRA) 180 MG TABLET           Start Date: --        End Date: --    L.ACID-B.ANIMALIS,BIFID,INFANT (FORTIFY OPTIMA PROBIOTIC) 50 BILLION CELL CPDR    Take by mouth.       Start Date: --        End Date: --    LEVOTHYROXINE (SYNTHROID) 100 MCG TABLET    Take 1 tablet (100 mcg total) by mouth before breakfast.       Start Date: 10/18/2022End Date: 10/18/2023    METOPROLOL SUCCINATE (TOPROL-XL) 25 MG 24 HR TABLET    Take 12.5 mg by mouth nightly.       Start Date: 4/19/2022 End Date: --    MYCOPHENOLATE (CELLCEPT) 500 MG TAB           Start Date: 7/15/2022 End Date: --    ONDANSETRON (ZOFRAN) 8 MG TABLET    Take 1 tablet (8 mg total) by mouth every 8 (eight) hours as needed for Nausea.       Start Date: 10/27/2022End Date: --    PANTOPRAZOLE (PROTONIX) 40 MG TABLET    Take 40 mg by mouth once daily.       Start Date: 6/10/2022 End Date: --    PRAVASTATIN (PRAVACHOL) 10 MG TABLET    Take 10 mg by mouth once daily.       Start Date: 4/12/2022 End Date: --    VENLAFAXINE (EFFEXOR-XR) 75 MG 24 HR CAPSULE    Take 1 capsule (75 mg total) by mouth once daily.       Start Date:  6/6/2022  End Date: 6/6/2023   Discontinued Medications    ATORVASTATIN (LIPITOR) 20 MG TABLET    Take 1 tablet by mouth once daily at 6am.       Start Date: --        End Date: 12/7/2022    FOLIC ACID (FOLVITE) 1 MG TABLET    Take 1 tablet by mouth once daily.       Start Date: 2/1/2022  End Date: 12/7/2022    LEVOTHYROXINE (SYNTHROID) 88 MCG TABLET    Take 88 mcg by mouth once daily at 6am.       Start Date: 4/22/2022 End Date: 12/7/2022          The patient's Health Maintenance was reviewed and the following appears to be due at this time:   Health Maintenance Due   Topic Date Due    Hepatitis C Screening  Never done    Pneumococcal Vaccines (Age 65+) (1 - PCV) Never done    Shingles Vaccine (1 of 2) Never done    COVID-19 Vaccine (3 - Booster for Moderna series) 11/18/2021    Influenza Vaccine (1) Never done         Follow up in about 2 months (around 2/7/2023) for Follow up. In addition to their scheduled follow up, the patient has also been instructed to follow up on as needed basis.

## 2022-12-13 DIAGNOSIS — U07.1 COVID-19: ICD-10-CM

## 2022-12-13 DIAGNOSIS — U07.1 COVID-19: Primary | ICD-10-CM

## 2022-12-13 RX ORDER — NIRMATRELVIR AND RITONAVIR 300-100 MG
KIT ORAL
Qty: 30 TABLET | Refills: 0 | Status: SHIPPED | OUTPATIENT
Start: 2022-12-13 | End: 2023-02-07

## 2022-12-13 RX ORDER — NIRMATRELVIR AND RITONAVIR 300-100 MG
KIT ORAL
Qty: 30 TABLET | Refills: 0 | OUTPATIENT
Start: 2022-12-13 | End: 2022-12-13 | Stop reason: SDUPTHER

## 2023-01-03 ENCOUNTER — PATIENT OUTREACH (OUTPATIENT)
Dept: ADMINISTRATIVE | Facility: HOSPITAL | Age: 68
End: 2023-01-03
Payer: COMMERCIAL

## 2023-01-23 DIAGNOSIS — E78.2 MIXED HYPERLIPIDEMIA: Primary | ICD-10-CM

## 2023-01-23 RX ORDER — PRAVASTATIN SODIUM 10 MG/1
TABLET ORAL
Qty: 90 TABLET | Refills: 3 | Status: SHIPPED | OUTPATIENT
Start: 2023-01-23 | End: 2024-01-22

## 2023-02-07 ENCOUNTER — OFFICE VISIT (OUTPATIENT)
Dept: FAMILY MEDICINE | Facility: CLINIC | Age: 68
End: 2023-02-07
Payer: COMMERCIAL

## 2023-02-07 VITALS
OXYGEN SATURATION: 99 % | SYSTOLIC BLOOD PRESSURE: 128 MMHG | HEART RATE: 90 BPM | HEIGHT: 60 IN | RESPIRATION RATE: 18 BRPM | WEIGHT: 161 LBS | BODY MASS INDEX: 31.61 KG/M2 | TEMPERATURE: 98 F | DIASTOLIC BLOOD PRESSURE: 77 MMHG

## 2023-02-07 DIAGNOSIS — J06.9 UPPER RESPIRATORY TRACT INFECTION, UNSPECIFIED TYPE: ICD-10-CM

## 2023-02-07 DIAGNOSIS — H66.93 BILATERAL OTITIS MEDIA, UNSPECIFIED OTITIS MEDIA TYPE: ICD-10-CM

## 2023-02-07 DIAGNOSIS — J01.90 ACUTE SINUSITIS, RECURRENCE NOT SPECIFIED, UNSPECIFIED LOCATION: Primary | ICD-10-CM

## 2023-02-07 PROCEDURE — 1159F PR MEDICATION LIST DOCUMENTED IN MEDICAL RECORD: ICD-10-PCS | Mod: CPTII,,, | Performed by: FAMILY MEDICINE

## 2023-02-07 PROCEDURE — 3074F PR MOST RECENT SYSTOLIC BLOOD PRESSURE < 130 MM HG: ICD-10-PCS | Mod: CPTII,,, | Performed by: FAMILY MEDICINE

## 2023-02-07 PROCEDURE — 1160F RVW MEDS BY RX/DR IN RCRD: CPT | Mod: CPTII,,, | Performed by: FAMILY MEDICINE

## 2023-02-07 PROCEDURE — 3078F PR MOST RECENT DIASTOLIC BLOOD PRESSURE < 80 MM HG: ICD-10-PCS | Mod: CPTII,,, | Performed by: FAMILY MEDICINE

## 2023-02-07 PROCEDURE — 99214 OFFICE O/P EST MOD 30 MIN: CPT | Mod: ,,, | Performed by: FAMILY MEDICINE

## 2023-02-07 PROCEDURE — 3078F DIAST BP <80 MM HG: CPT | Mod: CPTII,,, | Performed by: FAMILY MEDICINE

## 2023-02-07 PROCEDURE — 1160F PR REVIEW ALL MEDS BY PRESCRIBER/CLIN PHARMACIST DOCUMENTED: ICD-10-PCS | Mod: CPTII,,, | Performed by: FAMILY MEDICINE

## 2023-02-07 PROCEDURE — 3008F PR BODY MASS INDEX (BMI) DOCUMENTED: ICD-10-PCS | Mod: CPTII,,, | Performed by: FAMILY MEDICINE

## 2023-02-07 PROCEDURE — 3288F FALL RISK ASSESSMENT DOCD: CPT | Mod: CPTII,,, | Performed by: FAMILY MEDICINE

## 2023-02-07 PROCEDURE — 1101F PT FALLS ASSESS-DOCD LE1/YR: CPT | Mod: CPTII,,, | Performed by: FAMILY MEDICINE

## 2023-02-07 PROCEDURE — 1126F PR PAIN SEVERITY QUANTIFIED, NO PAIN PRESENT: ICD-10-PCS | Mod: CPTII,,, | Performed by: FAMILY MEDICINE

## 2023-02-07 PROCEDURE — 1126F AMNT PAIN NOTED NONE PRSNT: CPT | Mod: CPTII,,, | Performed by: FAMILY MEDICINE

## 2023-02-07 PROCEDURE — 3008F BODY MASS INDEX DOCD: CPT | Mod: CPTII,,, | Performed by: FAMILY MEDICINE

## 2023-02-07 PROCEDURE — 3074F SYST BP LT 130 MM HG: CPT | Mod: CPTII,,, | Performed by: FAMILY MEDICINE

## 2023-02-07 PROCEDURE — 1101F PR PT FALLS ASSESS DOC 0-1 FALLS W/OUT INJ PAST YR: ICD-10-PCS | Mod: CPTII,,, | Performed by: FAMILY MEDICINE

## 2023-02-07 PROCEDURE — 3288F PR FALLS RISK ASSESSMENT DOCUMENTED: ICD-10-PCS | Mod: CPTII,,, | Performed by: FAMILY MEDICINE

## 2023-02-07 PROCEDURE — 1159F MED LIST DOCD IN RCRD: CPT | Mod: CPTII,,, | Performed by: FAMILY MEDICINE

## 2023-02-07 PROCEDURE — 99214 PR OFFICE/OUTPT VISIT, EST, LEVL IV, 30-39 MIN: ICD-10-PCS | Mod: ,,, | Performed by: FAMILY MEDICINE

## 2023-02-07 RX ORDER — METHYLPREDNISOLONE 4 MG/1
TABLET ORAL
Qty: 21 EACH | Refills: 0 | Status: SHIPPED | OUTPATIENT
Start: 2023-02-07 | End: 2023-02-28

## 2023-02-07 RX ORDER — AMOXICILLIN AND CLAVULANATE POTASSIUM 875; 125 MG/1; MG/1
1 TABLET, FILM COATED ORAL 2 TIMES DAILY
COMMUNITY
Start: 2023-02-01 | End: 2023-04-11

## 2023-02-07 RX ORDER — DEXBROMPHENIRAMINE MALEATE, DEXTROMETHORPHAN HBR, PHENYLEPHRINE HCL 2; 20; 10 G/1; G/1; G/1
1 TABLET ORAL EVERY 6 HOURS
COMMUNITY
Start: 2023-02-01 | End: 2023-12-20

## 2023-02-07 NOTE — PROGRESS NOTES
Patient ID: [id number]     Chief Complaint: Follow-up        HPI:     Jacklyn Appiah is a 67 y.o. female here today for Follow-up for URI and sinusitis. Symptoms started last week. Has been taking alahist, augmentin, and albuterol.       ----------------------------  Acute anal fissure  Adenocarcinoma of left lung  Anxiety disorder, unspecified  Mukherjee's esophagus  Bilateral stenosis of carotid arteries greater than 50%  Celiac disease  Chronic cerebral ischemia  Esophageal spasm  Family history of breast cancer in mother  GERD (gastroesophageal reflux disease)  Heart attack  Heart disease  History of cervical cancer  History of lobectomy of lung  History of TIA (transient ischemic attack)  Hyperlipidemia  Hypertension  Hypothyroidism, postsurgical  Irregular heart beat  Long-term use of high-risk medication  Lumbar radiculopathy  Lumbar spinal stenosis  Lung mass  Major depression in complete remission  Migraine with aura  Osteopenia  Osteopenia after menopause  Painful defecation  Panic disorder (episodic paroxysmal anxiety)  Personal history of colonic polyps  Rectal bleeding  Restless leg syndrome  Seizures  Spinal stenosis of lumbar region with radiculopathy  Statin-induced myositis  Stroke  Thyroid disease  Vitamin D deficiency     Past Surgical History:   Procedure Laterality Date    APPENDECTOMY  1992    CERVICAL BIOPSY  1979    CHOLECYSTECTOMY  1992    COLONOSCOPY  10/15/2018    Dr Leander Rizo    COLONOSCOPY W/ BIOPSIES AND POLYPECTOMY  02/10/2022    Dr Boo Friend    EGD, WITH BALLOON DILATION  12/21/2020    Dr Leander Rizo    ESOPHAGOGASTRODUODENOSCOPY  05/18/2016    Dr Leander Rizo    THORACOSCOPY  03/19/2021    Dr Frank Larsen    TONSILLECTOMY, ADENOIDECTOMY  1968    TOTAL ABDOMINAL HYSTERECTOMY  1988    TOTAL THYROIDECTOMY  05/2000    TOTAL THYROIDECTOMY  04/2000       Review of patient's allergies indicates:   Allergen Reactions    Gluten Rash       Outpatient Medications Marked as  Taking for the 23 encounter (Office Visit) with Fransico Zavala, DO   Medication Sig Dispense Refill    ALAHIST CF 2-10-20 mg Tab Take 1 tablet by mouth every 6 (six) hours.      ALPRAZolam (XANAX) 0.5 MG tablet Take 0.5 mg by mouth nightly.      amoxicillin-clavulanate 875-125mg (AUGMENTIN) 875-125 mg per tablet Take 1 tablet by mouth 2 (two) times daily.      aspirin (ECOTRIN) 81 MG EC tablet Take 81 mg by mouth once daily.      calcium/magnesium (CALCIUM AND MAGNESIUM ORAL)       diltiaZEM (CARDIZEM CD) 120 MG Cp24 Take 120 mg by mouth once daily.      ergocalciferol (ERGOCALCIFEROL) 50,000 unit Cap Take 50,000 Units by mouth every 7 days.      famotidine (PEPCID) 40 MG tablet Take 1 tablet by mouth every evening.      fexofenadine (ALLEGRA) 180 MG tablet Take 180 mg by mouth once daily.      levothyroxine (SYNTHROID) 100 MCG tablet Take 1 tablet (100 mcg total) by mouth before breakfast. 30 tablet 11    metoprolol succinate (TOPROL-XL) 25 MG 24 hr tablet Take 12.5 mg by mouth nightly.      ondansetron (ZOFRAN) 8 MG tablet Take 1 tablet (8 mg total) by mouth every 8 (eight) hours as needed for Nausea. 30 tablet 1    pantoprazole (PROTONIX) 40 MG tablet Take 40 mg by mouth once daily.      pravastatin (PRAVACHOL) 10 MG tablet TAKE 1 TABLET BY MOUTH EVERY DAY 90 tablet 3    venlafaxine (EFFEXOR-XR) 75 MG 24 hr capsule Take 1 capsule (75 mg total) by mouth once daily. 30 capsule 11       Social History     Socioeconomic History    Marital status:    Tobacco Use    Smoking status: Former     Packs/day: 1.50     Years: 15.00     Pack years: 22.50     Types: Cigarettes     Quit date: 3/18/2021     Years since quittin.8    Smokeless tobacco: Never   Substance and Sexual Activity    Alcohol use: Never    Drug use: Never    Sexual activity: Not Currently     Birth control/protection: Post-menopausal, See Surgical Hx        Family History   Problem Relation Age of Onset    Breast cancer Mother     Diabetes  "Mellitus Mother     Heart disease Mother     Hyperlipidemia Mother     Hypertension Mother     Kidney disease Mother     Stroke Mother     COPD Mother     Depression Mother     Diabetes Mother     Mental illness Mother     Alzheimer's disease Father     Heart attack Brother     Heart disease Brother     Heart disease Maternal Grandmother     Lung cancer Maternal Grandfather     Breast cancer Paternal Grandmother         Patient Care Team:  Fransico Zavala DO as PCP - General (Family Medicine)  Leander Norman MD as Consulting Physician (Cardiology)  Carolina Wilcox MD as Consulting Physician (Hematology and Oncology)  Ernie Kitchen MD as Consulting Physician (Endocrinology)  Jena Larsen MD as Consulting Physician (Cardiothoracic Surgery)  Boo Friend MD as Consulting Physician (Gastroenterology)  Usha Felder MD as Consulting Physician (Medical Oncology)  Harry Ewing MD as Consulting Physician (Neurology)     Subjective:     Review of Systems   Constitutional:  Positive for fever.   HENT:  Positive for congestion, ear pain, sinus pain and sore throat.    Respiratory:  Positive for cough, sputum production and wheezing.    Cardiovascular:  Negative for chest pain.   Gastrointestinal:  Positive for diarrhea.   Neurological:  Negative for dizziness.     See HPI for details  All Other ROS: Negative except as stated in HPI.       Objective:     /77   Pulse 90   Temp 97.9 °F (36.6 °C) (Tympanic)   Resp 18   Ht 5' 0.24" (1.53 m)   Wt 73 kg (161 lb)   SpO2 99%   BMI 31.20 kg/m²     Physical Exam  Vitals reviewed.   Constitutional:       General: She is not in acute distress.     Appearance: Normal appearance.   HENT:      Right Ear: A middle ear effusion is present. Tympanic membrane is erythematous.      Left Ear: A middle ear effusion is present. Tympanic membrane is erythematous.      Mouth/Throat:      Mouth: Mucous membranes are dry.      Pharynx: Posterior oropharyngeal erythema " present.   Cardiovascular:      Rate and Rhythm: Normal rate and regular rhythm.      Heart sounds: No murmur heard.    No friction rub. No gallop.   Pulmonary:      Effort: No respiratory distress.      Breath sounds: No wheezing, rhonchi or rales.   Musculoskeletal:         General: No swelling, tenderness or deformity.      Right lower leg: No edema.      Left lower leg: No edema.   Lymphadenopathy:      Cervical: Cervical adenopathy present.   Skin:     General: Skin is warm and dry.      Findings: No lesion or rash.   Neurological:      General: No focal deficit present.      Mental Status: She is alert.   Psychiatric:         Mood and Affect: Mood normal.       Assessment/Plan:     1. Acute sinusitis, recurrence not specified, unspecified location  -     methylPREDNISolone (MEDROL DOSEPACK) 4 mg tablet; use as directed  Dispense: 21 each; Refill: 0    2. Bilateral otitis media, unspecified otitis media type    3. Upper respiratory tract infection, unspecified type      Continue augmentin as prescribed.     Follow up:     Follow up if symptoms worsen or fail to improve. In addition to their scheduled follow up, the patient has also been instructed to follow up on as needed basis.

## 2023-02-20 ENCOUNTER — TELEPHONE (OUTPATIENT)
Dept: HEMATOLOGY/ONCOLOGY | Facility: CLINIC | Age: 68
End: 2023-02-20
Payer: COMMERCIAL

## 2023-02-20 NOTE — TELEPHONE ENCOUNTER
Recommend she schedule a in office appointment with PCP for exam/ evaluation of pressure initially.     SANDOVAL DiasC.

## 2023-02-20 NOTE — TELEPHONE ENCOUNTER
Spoke to patient she states she can not get into see her PCP and has hard time getting in to be seen about her. I made apt for her to see Dr. Felder on Monday

## 2023-02-20 NOTE — TELEPHONE ENCOUNTER
Patient called and states ever since she had Covid in December she has this pressure on left side under her  ribs, describes the pressure as; when you are pregnant and the baby pushes on your ribs, its an uncomfortable pressure, it is on the same side her lung cancer was on and that is making her worried, next apt is not till April so she does not want to wait that long.  Please advise.

## 2023-02-27 ENCOUNTER — OFFICE VISIT (OUTPATIENT)
Dept: HEMATOLOGY/ONCOLOGY | Facility: CLINIC | Age: 68
End: 2023-02-27
Payer: COMMERCIAL

## 2023-02-27 VITALS
HEART RATE: 95 BPM | WEIGHT: 158.69 LBS | HEIGHT: 60 IN | TEMPERATURE: 98 F | OXYGEN SATURATION: 98 % | SYSTOLIC BLOOD PRESSURE: 98 MMHG | RESPIRATION RATE: 18 BRPM | DIASTOLIC BLOOD PRESSURE: 70 MMHG | BODY MASS INDEX: 31.15 KG/M2

## 2023-02-27 DIAGNOSIS — C34.12 PRIMARY ADENOCARCINOMA OF UPPER LOBE OF LEFT LUNG: Primary | ICD-10-CM

## 2023-02-27 DIAGNOSIS — R06.00 DYSPNEA, UNSPECIFIED TYPE: ICD-10-CM

## 2023-02-27 DIAGNOSIS — R07.9 CHEST PAIN, UNSPECIFIED TYPE: ICD-10-CM

## 2023-02-27 DIAGNOSIS — C34.12 MALIGNANT NEOPLASM OF UPPER LOBE OF LEFT LUNG: Primary | ICD-10-CM

## 2023-02-27 PROCEDURE — 3288F PR FALLS RISK ASSESSMENT DOCUMENTED: ICD-10-PCS | Mod: CPTII,,, | Performed by: INTERNAL MEDICINE

## 2023-02-27 PROCEDURE — 1101F PT FALLS ASSESS-DOCD LE1/YR: CPT | Mod: CPTII,,, | Performed by: INTERNAL MEDICINE

## 2023-02-27 PROCEDURE — 3078F DIAST BP <80 MM HG: CPT | Mod: CPTII,,, | Performed by: INTERNAL MEDICINE

## 2023-02-27 PROCEDURE — 3074F SYST BP LT 130 MM HG: CPT | Mod: CPTII,,, | Performed by: INTERNAL MEDICINE

## 2023-02-27 PROCEDURE — 1126F PR PAIN SEVERITY QUANTIFIED, NO PAIN PRESENT: ICD-10-PCS | Mod: CPTII,,, | Performed by: INTERNAL MEDICINE

## 2023-02-27 PROCEDURE — 1159F PR MEDICATION LIST DOCUMENTED IN MEDICAL RECORD: ICD-10-PCS | Mod: CPTII,,, | Performed by: INTERNAL MEDICINE

## 2023-02-27 PROCEDURE — 3008F BODY MASS INDEX DOCD: CPT | Mod: CPTII,,, | Performed by: INTERNAL MEDICINE

## 2023-02-27 PROCEDURE — 1159F MED LIST DOCD IN RCRD: CPT | Mod: CPTII,,, | Performed by: INTERNAL MEDICINE

## 2023-02-27 PROCEDURE — 3288F FALL RISK ASSESSMENT DOCD: CPT | Mod: CPTII,,, | Performed by: INTERNAL MEDICINE

## 2023-02-27 PROCEDURE — 1126F AMNT PAIN NOTED NONE PRSNT: CPT | Mod: CPTII,,, | Performed by: INTERNAL MEDICINE

## 2023-02-27 PROCEDURE — 3074F PR MOST RECENT SYSTOLIC BLOOD PRESSURE < 130 MM HG: ICD-10-PCS | Mod: CPTII,,, | Performed by: INTERNAL MEDICINE

## 2023-02-27 PROCEDURE — 99214 OFFICE O/P EST MOD 30 MIN: CPT | Mod: ,,, | Performed by: INTERNAL MEDICINE

## 2023-02-27 PROCEDURE — 99214 PR OFFICE/OUTPT VISIT, EST, LEVL IV, 30-39 MIN: ICD-10-PCS | Mod: ,,, | Performed by: INTERNAL MEDICINE

## 2023-02-27 PROCEDURE — 1101F PR PT FALLS ASSESS DOC 0-1 FALLS W/OUT INJ PAST YR: ICD-10-PCS | Mod: CPTII,,, | Performed by: INTERNAL MEDICINE

## 2023-02-27 PROCEDURE — 3078F PR MOST RECENT DIASTOLIC BLOOD PRESSURE < 80 MM HG: ICD-10-PCS | Mod: CPTII,,, | Performed by: INTERNAL MEDICINE

## 2023-02-27 PROCEDURE — 3008F PR BODY MASS INDEX (BMI) DOCUMENTED: ICD-10-PCS | Mod: CPTII,,, | Performed by: INTERNAL MEDICINE

## 2023-02-27 NOTE — PROGRESS NOTES
[facility] at [facility]    PATIENT: Jacklyn Appiah  MRN: [id number]  DATE: 2/27/2023  PCP: Sylvia HAMMOND, Daniela EVERETT    Diagnosis:   1. Primary adenocarcinoma of upper lobe of left lung    2. Chest pain, unspecified type    3. Dyspnea, unspecified type          Chief Complaint: Pt is fatigued, particularly in the afternoon.  Had COVID 2 months ago.     Heme/Onc History:   Diagnosis: Stage 1A3 ([id number]) Moderately differentiated adenocarcinoma of the Left Lung upper lobe with visceral Pleural invasion, EGFR negative    TREATMENT HISTORY:    Resection 3/19/21 left thoracotomy with LEOBARDO Lobectomy by Dr. Larsen  Adjuvant Alimta and Carbo x 4 cycles from May to July 2021    CURRENT TREATMENT:  Observation    History of Present Illness:  Ms. Appiah is a very pleasant 67-year-old white female with a history of thyroid cancer in 2020 and cervical cancer treated with hysterectomy in 1979. She underwent low-dose CT lung cancer screening 12/2020 which revealed a 2.2 cm left upper lobe mass. She is essentially asymptomatic. She was seen by pulmonologist, Dr. Fox Aviles who ordered a 1/6/21 PET/CT which revealed a spiculated left upper lobe nodule measuring up to about 22 mm. The maximum SUV is 7.9. MRI of brain was normal. She was referred to Dr. Larsen who performed a VATs wedge resection for biopstyfollowed by a lobectomy on 3/19/21.  She completed adjuvant Alimta Carbo x 4 cycles in 7/2021  She has a long history of tobacco use.     Pathology/Staging  Histologic Type: Invasive adenocarcinoma, acinar predominant    Total Tumor Size (size of entire tumor): 2.7 Centimeters (cm)  Visceral Pleura Invasion: Present  Margins: All margins are uninvolved by tumor  Lymph Nodes Involved: 0/8 +  Pathologic Stage Classification (pTNM, AJCC 8th Edition): Stage IB  Primary Tumor (pT): pT2a  Regional Lymph Nodes (pN): pN0    Assessment/Plan 1. Adenocarcinoma of left lung C34.92 Pt with history of lung cancer. I am  concerned about her HA, nausea, fatigue, and RUE weakness. Will get MRI of the brain. I will also get hip films and a bone scan because of her new onset R hip pain. F/U after above.   2. Hypothyroidism E03.9 Will repeat thyroid studies and adjust levothyroxine accordingly.      Subjective:    Interval History: Ms. Appiah returns for follow up.      8/13/21: EGFR negative so patient not a candidate for adjuvant Tagrisso. F/U visits will follow NCCN surveillance guidelines with H&P, Chest CT + contrast q 6 months for 2 years, then, H&P with LD non-contrast-enhanced chest CT annually.    10/13/21: Jacklyn returns for follow up of stage I lung cancer, s/p resection and 4 cycles of carbo/alimta. Pt had a CT scan which shows a stable chest node and stable adrenal nodularities. She is noting some rectal bleeding without change in stool, and she is seeing GI for that. She also notes oral lesions which are similar to episodes of lichens planus. Otherwise feeling well. She quite smoking at the time of her surgery and did not resume smoking. She has had 2 doses of the Moderna vaccine.    4/22/22: Jacklyn returns for follow up of stage I lung cancer. She does not feel well. She notes that she has a headache for 3 weeks and nausea for 2 weeks. This is not typical for her usual migraines. She also notes fairly extreme fatigue. She states that she sleeps most of the time she is not working. She also notes some right hip pain which has been present for 2 months and this is associated with some numbness in the right anterior thigh whenever she walks. She was noted to have a TSH of 10 back in 6/21, but her dose was not adjusted.  10/18/22 Interval History:Ms. Appiah returns for follow up.  Her main complaint is fatigue, which has been present since COVID 2 months ago.  Her CT shows no recurrence.  Labs remarkable for a TSH of 6.  She also has a non-healing anal fissure. She has seen Dr. Friend for that. Her cough and back  pain are  "stable.      11/28/22MsTeresa Appiah has a phone visit to follow up on her hypothyroidism. She was started on a new dose of levothyroxine at 100 mcg, with follow up labs.  She notes some GI symptoms which she thinks are related to a viral gastroenteritis.  Her TSH is 3.43.      Today 2/27/23  patient presents prior to her previously scheduled appt in April. States she had a URI in 11/22, covid in 12/22 and another URI in late jan early Feb 2023.  Since then she has had a pressure sensation in there lower left chest and upper left abdomen. Feels like is it a pressure "like I am pregnant". He has dyspnea though notes that her home O2 sats have been fine; is 98% today.     Past Medical History:   Past Medical History:   Diagnosis Date    Acute anal fissure     Adenocarcinoma of left lung     Anxiety disorder, unspecified     Mukherjee's esophagus     Bilateral stenosis of carotid arteries greater than 50%     Celiac disease     Chronic cerebral ischemia     Esophageal spasm     Family history of breast cancer in mother     GERD (gastroesophageal reflux disease)     Heart attack     Heart disease     History of cervical cancer     History of lobectomy of lung     History of TIA (transient ischemic attack)     Hyperlipidemia     Hypertension     Hypothyroidism, postsurgical     Irregular heart beat     Long-term use of high-risk medication     Lumbar radiculopathy     Lumbar spinal stenosis     Lung mass     Major depression in complete remission     Migraine with aura     Osteopenia     Osteopenia after menopause     Painful defecation     Panic disorder (episodic paroxysmal anxiety)     Personal history of colonic polyps 02/10/2022    Rectal bleeding     Restless leg syndrome     Seizures     Spinal stenosis of lumbar region with radiculopathy     Statin-induced myositis     Stroke     Thyroid disease     Vitamin D deficiency        Past Surgical HIstory:   Past Surgical History:   Procedure Laterality Date    APPENDECTOMY  " 1992    CERVICAL BIOPSY  1979    CHOLECYSTECTOMY  1992    COLONOSCOPY  10/15/2018    Dr Leander Rizo    COLONOSCOPY W/ BIOPSIES AND POLYPECTOMY  02/10/2022    Dr Boo Friend    EGD, WITH BALLOON DILATION  12/21/2020    Dr Leander Rizo    ESOPHAGOGASTRODUODENOSCOPY  05/18/2016    Dr Leander Rizo    THORACOSCOPY  03/19/2021    Dr Frank Larsen    TONSILLECTOMY, ADENOIDECTOMY  1968    TOTAL ABDOMINAL HYSTERECTOMY  1988    TOTAL THYROIDECTOMY  05/2000    TOTAL THYROIDECTOMY  04/2000       Family History:   Family History   Problem Relation Age of Onset    Breast cancer Mother     Diabetes Mellitus Mother     Heart disease Mother     Hyperlipidemia Mother     Hypertension Mother     Kidney disease Mother     Stroke Mother     COPD Mother     Depression Mother     Diabetes Mother     Mental illness Mother     Alzheimer's disease Father     Heart attack Brother     Heart disease Brother     Heart disease Maternal Grandmother     Lung cancer Maternal Grandfather     Breast cancer Paternal Grandmother        Social History:  reports that she quit smoking about 1 years ago. Her smoking use included cigarettes. She has a 22.50 pack-year smoking history. She has never used smokeless tobacco. She reports that she does not drink alcohol and does not use drugs.    Allergies:  Review of patient's allergies indicates:   Allergen Reactions    Gluten Rash       Medications:  Current Outpatient Medications   Medication Sig Dispense Refill    ALPRAZolam (XANAX) 0.5 MG tablet Take 0.5 mg by mouth nightly.      aspirin (ECOTRIN) 81 MG EC tablet Take 81 mg by mouth once daily.      calcium/magnesium (CALCIUM AND MAGNESIUM ORAL)       diltiaZEM (CARDIZEM CD) 120 MG Cp24 Take 120 mg by mouth once daily.      ergocalciferol (ERGOCALCIFEROL) 50,000 unit Cap Take 50,000 Units by mouth every 7 days.      famotidine (PEPCID) 40 MG tablet Take 1 tablet by mouth every evening.      levothyroxine (SYNTHROID) 100 MCG tablet Take 1 tablet  (100 mcg total) by mouth before breakfast. 30 tablet 11    metoprolol succinate (TOPROL-XL) 25 MG 24 hr tablet Take 12.5 mg by mouth nightly.      ondansetron (ZOFRAN) 8 MG tablet Take 1 tablet (8 mg total) by mouth every 8 (eight) hours as needed for Nausea. 30 tablet 1    pantoprazole (PROTONIX) 40 MG tablet Take 40 mg by mouth once daily.      pravastatin (PRAVACHOL) 10 MG tablet TAKE 1 TABLET BY MOUTH EVERY DAY 90 tablet 3    venlafaxine (EFFEXOR-XR) 75 MG 24 hr capsule Take 1 capsule (75 mg total) by mouth once daily. 30 capsule 11    ALAHIST CF 2-10-20 mg Tab Take 1 tablet by mouth every 6 (six) hours.      amoxicillin-clavulanate 875-125mg (AUGMENTIN) 875-125 mg per tablet Take 1 tablet by mouth 2 (two) times daily.      fexofenadine (ALLEGRA) 180 MG tablet Take 180 mg by mouth once daily.      methylPREDNISolone (MEDROL DOSEPACK) 4 mg tablet use as directed (Patient not taking: Reported on 2/27/2023) 21 each 0     No current facility-administered medications for this visit.       Review of Systems   Constitutional:  Negative for appetite change and unexpected weight change.   HENT:  Negative for mouth sores.    Eyes:  Negative for visual disturbance.   Respiratory:  Positive for chest tightness and shortness of breath. Negative for cough.    Cardiovascular:  Negative for chest pain.   Gastrointestinal:  Negative for abdominal pain and diarrhea.   Genitourinary:  Positive for frequency.   Musculoskeletal:  Positive for back pain.   Skin:  Positive for rash.   Neurological:  Negative for headaches.   Hematological:  Negative for adenopathy.   Psychiatric/Behavioral:  The patient is nervous/anxious.      Objective:      Vitals:   Vitals:    02/27/23 1319   BP: 98/70   BP Location: Right arm   Patient Position: Sitting   BP Method: Medium (Automatic)   Pulse: 95   Resp: 18   Temp: 98.1 °F (36.7 °C)   TempSrc: Oral   SpO2: 98%   Weight: 72 kg (158 lb 11.2 oz)   Height: 5' (1.524 m)     BMI: Body mass index is  30.99 kg/m².    Physical Exam  Vitals reviewed.   Constitutional:       Appearance: Normal appearance.   Cardiovascular:      Rate and Rhythm: Normal rate and regular rhythm.   Pulmonary:      Effort: Pulmonary effort is normal.      Breath sounds: Normal breath sounds.   Abdominal:      General: Abdomen is flat. Bowel sounds are normal.      Palpations: Abdomen is soft.   Musculoskeletal:         General: Normal range of motion.      Cervical back: Neck supple.   Neurological:      General: No focal deficit present.      Mental Status: She is alert.   Psychiatric:         Mood and Affect: Mood normal.         Behavior: Behavior normal.     Laboratory Data:      Imaging:   Assessment/Plan:       1. Primary adenocarcinoma of upper lobe of left lung    2. Chest pain, unspecified type    3. Dyspnea, unspecified type        Lung cancer resected, WILFREDO  Iatrogenic Hypothyroidism following total thyroidectomy for thyroid cancer in 2000 , on synthroid to 100 mcg   Will get Ct chest w/o and CT abdomen with and see MD afterwards.           Usha Felder MD

## 2023-03-09 ENCOUNTER — HOSPITAL ENCOUNTER (OUTPATIENT)
Dept: RADIOLOGY | Facility: HOSPITAL | Age: 68
Discharge: HOME | End: 2023-03-09
Attending: INTERNAL MEDICINE
Payer: COMMERCIAL

## 2023-03-09 DIAGNOSIS — C34.12 MALIGNANT NEOPLASM OF UPPER LOBE OF LEFT LUNG: ICD-10-CM

## 2023-03-09 DIAGNOSIS — Z12.39 ENCOUNTER FOR SCREENING FOR MALIGNANT NEOPLASM OF BREAST, UNSPECIFIED SCREENING MODALITY: Primary | ICD-10-CM

## 2023-03-09 DIAGNOSIS — C34.90 MALIGNANT NEOPLASM OF UNSPECIFIED PART OF UNSPECIFIED BRONCHUS OR LUNG: ICD-10-CM

## 2023-03-09 LAB
CREAT SERPL-MCNC: 0.8 MG/DL (ref 0.5–1.4)
SAMPLE: NORMAL

## 2023-03-09 PROCEDURE — 25500020 PHARM REV CODE 255: Performed by: INTERNAL MEDICINE

## 2023-03-09 PROCEDURE — 74160 CT ABDOMEN W/CONTRAST: CPT | Mod: TC

## 2023-03-09 PROCEDURE — 71250 CT THORAX DX C-: CPT | Mod: TC

## 2023-03-09 RX ADMIN — DIATRIZOATE MEGLUMINE AND DIATRIZOATE SODIUM 30 ML: 660; 100 LIQUID ORAL; RECTAL at 10:03

## 2023-03-09 RX ADMIN — IOPAMIDOL 100 ML: 755 INJECTION, SOLUTION INTRAVENOUS at 12:03

## 2023-04-11 ENCOUNTER — OFFICE VISIT (OUTPATIENT)
Dept: FAMILY MEDICINE | Facility: CLINIC | Age: 68
End: 2023-04-11
Payer: COMMERCIAL

## 2023-04-11 VITALS
SYSTOLIC BLOOD PRESSURE: 107 MMHG | TEMPERATURE: 98 F | HEART RATE: 76 BPM | RESPIRATION RATE: 18 BRPM | HEIGHT: 66 IN | WEIGHT: 161 LBS | DIASTOLIC BLOOD PRESSURE: 74 MMHG | OXYGEN SATURATION: 99 % | BODY MASS INDEX: 25.88 KG/M2

## 2023-04-11 DIAGNOSIS — Z12.31 ENCOUNTER FOR SCREENING MAMMOGRAM FOR BREAST CANCER: Primary | ICD-10-CM

## 2023-04-11 DIAGNOSIS — R10.12 LUQ PAIN: ICD-10-CM

## 2023-04-11 DIAGNOSIS — K22.70 BARRETT'S ESOPHAGUS WITHOUT DYSPLASIA: ICD-10-CM

## 2023-04-11 PROCEDURE — 1160F RVW MEDS BY RX/DR IN RCRD: CPT | Mod: CPTII,,, | Performed by: FAMILY MEDICINE

## 2023-04-11 PROCEDURE — 3288F FALL RISK ASSESSMENT DOCD: CPT | Mod: CPTII,,, | Performed by: FAMILY MEDICINE

## 2023-04-11 PROCEDURE — 3074F PR MOST RECENT SYSTOLIC BLOOD PRESSURE < 130 MM HG: ICD-10-PCS | Mod: CPTII,,, | Performed by: FAMILY MEDICINE

## 2023-04-11 PROCEDURE — 1125F AMNT PAIN NOTED PAIN PRSNT: CPT | Mod: CPTII,,, | Performed by: FAMILY MEDICINE

## 2023-04-11 PROCEDURE — 1101F PT FALLS ASSESS-DOCD LE1/YR: CPT | Mod: CPTII,,, | Performed by: FAMILY MEDICINE

## 2023-04-11 PROCEDURE — 1160F PR REVIEW ALL MEDS BY PRESCRIBER/CLIN PHARMACIST DOCUMENTED: ICD-10-PCS | Mod: CPTII,,, | Performed by: FAMILY MEDICINE

## 2023-04-11 PROCEDURE — 3078F PR MOST RECENT DIASTOLIC BLOOD PRESSURE < 80 MM HG: ICD-10-PCS | Mod: CPTII,,, | Performed by: FAMILY MEDICINE

## 2023-04-11 PROCEDURE — 1125F PR PAIN SEVERITY QUANTIFIED, PAIN PRESENT: ICD-10-PCS | Mod: CPTII,,, | Performed by: FAMILY MEDICINE

## 2023-04-11 PROCEDURE — 99214 OFFICE O/P EST MOD 30 MIN: CPT | Mod: ,,, | Performed by: FAMILY MEDICINE

## 2023-04-11 PROCEDURE — 3074F SYST BP LT 130 MM HG: CPT | Mod: CPTII,,, | Performed by: FAMILY MEDICINE

## 2023-04-11 PROCEDURE — 99214 PR OFFICE/OUTPT VISIT, EST, LEVL IV, 30-39 MIN: ICD-10-PCS | Mod: ,,, | Performed by: FAMILY MEDICINE

## 2023-04-11 PROCEDURE — 3008F PR BODY MASS INDEX (BMI) DOCUMENTED: ICD-10-PCS | Mod: CPTII,,, | Performed by: FAMILY MEDICINE

## 2023-04-11 PROCEDURE — 1101F PR PT FALLS ASSESS DOC 0-1 FALLS W/OUT INJ PAST YR: ICD-10-PCS | Mod: CPTII,,, | Performed by: FAMILY MEDICINE

## 2023-04-11 PROCEDURE — 3078F DIAST BP <80 MM HG: CPT | Mod: CPTII,,, | Performed by: FAMILY MEDICINE

## 2023-04-11 PROCEDURE — 1159F MED LIST DOCD IN RCRD: CPT | Mod: CPTII,,, | Performed by: FAMILY MEDICINE

## 2023-04-11 PROCEDURE — 3008F BODY MASS INDEX DOCD: CPT | Mod: CPTII,,, | Performed by: FAMILY MEDICINE

## 2023-04-11 PROCEDURE — 1159F PR MEDICATION LIST DOCUMENTED IN MEDICAL RECORD: ICD-10-PCS | Mod: CPTII,,, | Performed by: FAMILY MEDICINE

## 2023-04-11 PROCEDURE — 3288F PR FALLS RISK ASSESSMENT DOCUMENTED: ICD-10-PCS | Mod: CPTII,,, | Performed by: FAMILY MEDICINE

## 2023-04-11 RX ORDER — HYOSCYAMINE SULFATE 0.12 MG/1
0.12 TABLET SUBLINGUAL EVERY 4 HOURS PRN
Qty: 24 TABLET | Refills: 0 | Status: SHIPPED | OUTPATIENT
Start: 2023-04-11 | End: 2024-03-20

## 2023-04-11 NOTE — PROGRESS NOTES
Jacklyn Appiah  04/11/2023  [id number]    GIDEON PERSON MD  Patient Care Team:  Gideon Person MD as PCP - General (Family Medicine)  Leander Norman MD as Consulting Physician (Cardiology)  Carolina Wilcox MD (Inactive) as Consulting Physician (Hematology and Oncology)  Ernie Kitchen MD as Consulting Physician (Endocrinology)  Jena Larsen MD as Consulting Physician (Cardiothoracic Surgery)  Boo Friend MD as Consulting Physician (Gastroenterology)  Usha Felder MD as Consulting Physician (Medical Oncology)  Harry Ewing MD as Consulting Physician (Neurology)      Chief Complaint:  Chief Complaint   Patient presents with    Left Side/Rib Pressure Under Breast       History of Present Illness:  HPI    68 y.o. female who presents today c/o pain under left breast since having covid in December 2022.  Patient has a history of left lung cancer and is s/p lobectomy. She feels like something is pushing up on her ribs. She saw oncology who ordered ct chest/abd/pelvis and all was normal. States it is a constant pressure likely someone is sitting on the bottom of her luq right beneath her rib cage. She has a MMG scheduled but is barely overdue for it. Pain does not radiate and has no associated symptoms. Not worsened by food. No night sweats, weight loss, decreased appetite. UTD with colonoscopy. She does have ongoing GERD and constant nausea despite PPI and H2 blocker. She does not have chest pain, chest pressure, shortness of breath or orthopnea. She does have a history of Mukherjee's Esophgaus.     Review of Systems  General: denies f/c, weight loss, night sweats, decreased appetite  Eye: denies blurred vision, changes in vision  Respiratory: denies sob, wheezing, cough  Cardiovascular: denies chest pain, palpitations, edema  Gastrointestinal: denies abdominal pain, n/v, constipation, diarrhea  Integumentary: denies rashes, pruritis        Health Maintenance  Health Maintenance Topics with  "due status: Not Due       Topic Last Completion Date    TETANUS VACCINE 05/18/2020    DEXA Scan 12/23/2020    Colorectal Cancer Screening 02/10/2022    Lipid Panel 11/29/2022     Health Maintenance Due   Topic Date Due    Hepatitis C Screening  Never done    Pneumococcal Vaccines (Age 65+) (1 - PCV) Never done    Shingles Vaccine (1 of 2) Never done    Hemoglobin A1c (Diabetic Prevention Screening)  Never done    COVID-19 Vaccine (5 - Booster) 11/18/2021    Mammogram  03/17/2023       Exam:  Vitals:    04/11/23 1316   BP: 107/74   BP Location: Left arm   Patient Position: Sitting   BP Method: Large (Automatic)   Pulse: 76   Resp: 18   Temp: 97.7 °F (36.5 °C)   TempSrc: Oral   SpO2: 99%   Weight: 73 kg (161 lb)   Height: 5' 6" (1.676 m)     Weight: 73 kg (161 lb)   Body mass index is 25.99 kg/m².      Physical Exam  Constitutional: NAD, alert, pleasant  Respiratory: CTAB, no wheezes, rales or rhonchi. No accessory muscle use  Eyes: EOMI  Cardiovascular: RRR, No m/r/g. No JVD. No LE edema  Gastrointestinal: BS+, tender in LUQ right below middle of bottom rib. NO palpable mass or organomegaly. nondistended  Integumentary: warm, dry, intact  Psych: AA&Ox3      ICD-10-CM ICD-9-CM   1. Encounter for screening mammogram for breast cancer  Z12.31 V76.12   2. LUQ pain  R10.12 789.02   3. Mukherjee's esophagus without dysplasia  K22.70 530.85       1. Encounter for screening mammogram for breast cancer  -     Mammo Digital Screening Bilat w/ Tereso; Future; Expected date: 04/11/2023    2. LUQ pain  Assessment & Plan:  This could possibly be related to her stomach. She continues to have severe GERD symptoms despite taking a PPI and H2 blocker as well as constant nausea. I recommend she see Dr. Friend for an EGD  I recommended elavil but she states she took it in the past and it caused her to be too drowsy  I doubt this is any sort of intestinal spasm but we will try levsin and see  CT abd/pelvis and CT chest normal.       3. " Mukherjee's esophagus without dysplasia  Overview:  Sees gi. On PPI    Assessment & Plan:  F/u with dr brantley      Other orders  -     hyoscyamine (LEVSIN/SL) 0.125 mg Subl; Place 1 tablet (0.125 mg total) under the tongue every 4 (four) hours as needed.  Dispense: 24 tablet; Refill: 0         Follow up: Follow up for wellness.      Care Plan/Goals: Reviewed   Goals    None

## 2023-04-11 NOTE — ASSESSMENT & PLAN NOTE
This could possibly be related to her stomach. She continues to have severe GERD symptoms despite taking a PPI and H2 blocker as well as constant nausea. I recommend she see Dr. Friend for an EGD  I recommended elavil but she states she took it in the past and it caused her to be too drowsy  I doubt this is any sort of intestinal spasm but we will try levsin and see  CT abd/pelvis and CT chest normal.

## 2023-04-26 ENCOUNTER — HOSPITAL ENCOUNTER (OUTPATIENT)
Dept: RADIOLOGY | Facility: HOSPITAL | Age: 68
Discharge: HOME | End: 2023-04-26
Attending: FAMILY MEDICINE
Payer: COMMERCIAL

## 2023-04-26 DIAGNOSIS — Z12.31 ENCOUNTER FOR SCREENING MAMMOGRAM FOR BREAST CANCER: ICD-10-CM

## 2023-04-26 PROCEDURE — 77063 MAMMO DIGITAL SCREENING BILAT WITH TOMO: ICD-10-PCS | Mod: 26,,, | Performed by: RADIOLOGY

## 2023-04-26 PROCEDURE — 77067 MAMMO DIGITAL SCREENING BILAT WITH TOMO: ICD-10-PCS | Mod: 26,,, | Performed by: RADIOLOGY

## 2023-04-26 PROCEDURE — 77067 SCR MAMMO BI INCL CAD: CPT | Mod: TC

## 2023-04-26 PROCEDURE — 77063 BREAST TOMOSYNTHESIS BI: CPT | Mod: 26,,, | Performed by: RADIOLOGY

## 2023-04-26 PROCEDURE — 77067 SCR MAMMO BI INCL CAD: CPT | Mod: 26,,, | Performed by: RADIOLOGY

## 2023-05-29 DIAGNOSIS — F32.5 MAJOR DEPRESSION IN COMPLETE REMISSION: ICD-10-CM

## 2023-05-29 RX ORDER — VENLAFAXINE HYDROCHLORIDE 75 MG/1
CAPSULE, EXTENDED RELEASE ORAL
Qty: 90 CAPSULE | Refills: 3 | Status: SHIPPED | OUTPATIENT
Start: 2023-05-29

## 2023-07-05 DIAGNOSIS — C80.1 ADENOCARCINOMA: Primary | ICD-10-CM

## 2023-10-09 ENCOUNTER — PATIENT MESSAGE (OUTPATIENT)
Dept: ADMINISTRATIVE | Facility: HOSPITAL | Age: 68
End: 2023-10-09
Payer: COMMERCIAL

## 2023-10-11 ENCOUNTER — PATIENT OUTREACH (OUTPATIENT)
Dept: ADMINISTRATIVE | Facility: HOSPITAL | Age: 68
End: 2023-10-11
Payer: COMMERCIAL

## 2023-10-11 DIAGNOSIS — M85.80 OSTEOPENIA AFTER MENOPAUSE: Primary | ICD-10-CM

## 2023-10-11 DIAGNOSIS — Z78.0 POSTMENOPAUSAL: ICD-10-CM

## 2023-10-11 DIAGNOSIS — Z78.0 OSTEOPENIA AFTER MENOPAUSE: Primary | ICD-10-CM

## 2023-11-10 ENCOUNTER — HOSPITAL ENCOUNTER (OUTPATIENT)
Dept: RADIOLOGY | Facility: HOSPITAL | Age: 68
Discharge: HOME | End: 2023-11-10
Attending: FAMILY MEDICINE
Payer: COMMERCIAL

## 2023-11-10 DIAGNOSIS — M85.80 OSTEOPENIA AFTER MENOPAUSE: ICD-10-CM

## 2023-11-10 DIAGNOSIS — Z78.0 OSTEOPENIA AFTER MENOPAUSE: ICD-10-CM

## 2023-11-10 DIAGNOSIS — Z78.0 POSTMENOPAUSAL: ICD-10-CM

## 2023-11-10 PROCEDURE — 77078 CT BONE DENSITY AXIAL: CPT | Mod: TC

## 2023-11-13 PROBLEM — M81.0 AGE-RELATED OSTEOPOROSIS WITHOUT CURRENT PATHOLOGICAL FRACTURE: Status: ACTIVE | Noted: 2023-11-13

## 2023-11-13 NOTE — PROGRESS NOTES
Patient has severe osteoporosis of spine. I definitely recommend starting fosamax, which is a pill that she would take weekly on an empty stomach. She would need to make sure her teeth are in good health as it can possibly causes osteonecrosis of the bone of the jaw, acid reflux symptoms, upset stomach. If the medication is taken for years then stopped, her bones can become rapidly brittle which does increase her risk of fracture. She should continue taking vitamin d3 and calcium. Perform weight bearing exercises 3-5x per week.    If she agrees to fosamax, let me know and I will send it in. If not we will monitor her bones yearly.

## 2023-11-15 DIAGNOSIS — M81.0 AGE-RELATED OSTEOPOROSIS WITHOUT CURRENT PATHOLOGICAL FRACTURE: Primary | ICD-10-CM

## 2023-11-15 RX ORDER — ALENDRONATE SODIUM 70 MG/1
70 TABLET ORAL
Qty: 12 TABLET | Refills: 3 | Status: SHIPPED | OUTPATIENT
Start: 2023-11-15 | End: 2023-12-20

## 2023-12-14 ENCOUNTER — TELEPHONE (OUTPATIENT)
Dept: FAMILY MEDICINE | Facility: CLINIC | Age: 68
End: 2023-12-14
Payer: COMMERCIAL

## 2023-12-14 DIAGNOSIS — Z00.00 ENCOUNTER FOR WELLNESS EXAMINATION: Primary | ICD-10-CM

## 2023-12-14 DIAGNOSIS — E55.9 VITAMIN D DEFICIENCY: ICD-10-CM

## 2023-12-14 DIAGNOSIS — Z11.59 NEED FOR HEPATITIS C SCREENING TEST: ICD-10-CM

## 2023-12-14 DIAGNOSIS — E78.2 MIXED HYPERLIPIDEMIA: ICD-10-CM

## 2023-12-14 DIAGNOSIS — Z13.1 SCREENING FOR DIABETES MELLITUS: ICD-10-CM

## 2023-12-14 DIAGNOSIS — E89.0 POSTOPERATIVE HYPOTHYROIDISM: ICD-10-CM

## 2023-12-14 NOTE — TELEPHONE ENCOUNTER
----- Message from Rosa Senegal sent at 12/14/2023  7:28 AM CST -----  Regarding: labs  .Caller is requesting to schedule their Lab appointment prior to annual appointment.  Order is not listed in EPIC.  Please enter order and contact patient to schedule.    Name of Caller: pt    Preferred Date and Time of Labs: now    Date of EPP Appointment:  12-20-23    Where would they like the lab performed? [facility]     Would the patient rather a call back? Yes please call pt and let her labs are in if no answer please leave a message     Best Call Back Number: [phone]    Additional Information: please put labs in system thanks

## 2023-12-15 ENCOUNTER — LAB VISIT (OUTPATIENT)
Dept: LAB | Facility: HOSPITAL | Age: 68
End: 2023-12-15
Attending: FAMILY MEDICINE
Payer: COMMERCIAL

## 2023-12-15 DIAGNOSIS — E78.2 MIXED HYPERLIPIDEMIA: ICD-10-CM

## 2023-12-15 DIAGNOSIS — Z00.00 ENCOUNTER FOR WELLNESS EXAMINATION: ICD-10-CM

## 2023-12-15 DIAGNOSIS — E55.9 VITAMIN D DEFICIENCY: ICD-10-CM

## 2023-12-15 DIAGNOSIS — Z13.1 SCREENING FOR DIABETES MELLITUS: ICD-10-CM

## 2023-12-15 DIAGNOSIS — Z11.59 NEED FOR HEPATITIS C SCREENING TEST: ICD-10-CM

## 2023-12-15 DIAGNOSIS — E89.0 POSTOPERATIVE HYPOTHYROIDISM: ICD-10-CM

## 2023-12-15 LAB
ALBUMIN SERPL-MCNC: 4 G/DL (ref 3.4–4.8)
ALBUMIN/GLOB SERPL: 1.3 RATIO (ref 1.1–2)
ALP SERPL-CCNC: 159 UNIT/L (ref 40–150)
ALT SERPL-CCNC: 27 UNIT/L (ref 0–55)
APPEARANCE UR: CLEAR
AST SERPL-CCNC: 22 UNIT/L (ref 5–34)
BASOPHILS # BLD AUTO: 0.09 X10(3)/MCL
BASOPHILS NFR BLD AUTO: 1.1 %
BILIRUB SERPL-MCNC: 0.4 MG/DL
BILIRUB UR QL STRIP.AUTO: NEGATIVE
BUN SERPL-MCNC: 14 MG/DL (ref 9.8–20.1)
CALCIUM SERPL-MCNC: 9 MG/DL (ref 8.4–10.2)
CHLORIDE SERPL-SCNC: 106 MMOL/L (ref 98–107)
CHOLEST SERPL-MCNC: 183 MG/DL
CHOLEST/HDLC SERPL: 4 {RATIO} (ref 0–5)
CO2 SERPL-SCNC: 26 MMOL/L (ref 23–31)
COLOR UR AUTO: YELLOW
CREAT SERPL-MCNC: 0.8 MG/DL (ref 0.55–1.02)
DEPRECATED CALCIDIOL+CALCIFEROL SERPL-MC: 52 NG/ML (ref 30–80)
EOSINOPHIL # BLD AUTO: 0.42 X10(3)/MCL (ref 0–0.9)
EOSINOPHIL NFR BLD AUTO: 5.1 %
ERYTHROCYTE [DISTWIDTH] IN BLOOD BY AUTOMATED COUNT: 17 % (ref 11.5–17)
EST. AVERAGE GLUCOSE BLD GHB EST-MCNC: 125.5 MG/DL
GFR SERPLBLD CREATININE-BSD FMLA CKD-EPI: >60 MLS/MIN/1.73/M2
GLOBULIN SER-MCNC: 3.2 GM/DL (ref 2.4–3.5)
GLUCOSE SERPL-MCNC: 132 MG/DL (ref 82–115)
GLUCOSE UR QL STRIP.AUTO: NEGATIVE
HBA1C MFR BLD: 6 %
HCT VFR BLD AUTO: 41.3 % (ref 37–47)
HCV AB SERPL QL IA: NONREACTIVE
HDLC SERPL-MCNC: 44 MG/DL (ref 35–60)
HGB BLD-MCNC: 12.7 G/DL (ref 12–16)
IMM GRANULOCYTES # BLD AUTO: 0.07 X10(3)/MCL (ref 0–0.04)
IMM GRANULOCYTES NFR BLD AUTO: 0.8 %
KETONES UR QL STRIP.AUTO: NEGATIVE
LDLC SERPL CALC-MCNC: 107 MG/DL (ref 50–140)
LEUKOCYTE ESTERASE UR QL STRIP.AUTO: NEGATIVE
LYMPHOCYTES # BLD AUTO: 2.08 X10(3)/MCL (ref 0.6–4.6)
LYMPHOCYTES NFR BLD AUTO: 25.1 %
MCH RBC QN AUTO: 22.7 PG (ref 27–31)
MCHC RBC AUTO-ENTMCNC: 30.8 G/DL (ref 33–36)
MCV RBC AUTO: 73.9 FL (ref 80–94)
MONOCYTES # BLD AUTO: 0.63 X10(3)/MCL (ref 0.1–1.3)
MONOCYTES NFR BLD AUTO: 7.6 %
NEUTROPHILS # BLD AUTO: 5.01 X10(3)/MCL (ref 2.1–9.2)
NEUTROPHILS NFR BLD AUTO: 60.3 %
NITRITE UR QL STRIP.AUTO: NEGATIVE
NRBC BLD AUTO-RTO: 0 %
PH UR STRIP.AUTO: 5.5 [PH]
PLATELET # BLD AUTO: 309 X10(3)/MCL (ref 130–400)
PMV BLD AUTO: 10.1 FL (ref 7.4–10.4)
POTASSIUM SERPL-SCNC: 4 MMOL/L (ref 3.5–5.1)
PROT SERPL-MCNC: 7.2 GM/DL (ref 5.8–7.6)
PROT UR QL STRIP.AUTO: NEGATIVE
RBC # BLD AUTO: 5.59 X10(6)/MCL (ref 4.2–5.4)
RBC UR QL AUTO: NEGATIVE
SODIUM SERPL-SCNC: 141 MMOL/L (ref 136–145)
SP GR UR STRIP.AUTO: >=1.03 (ref 1–1.03)
TRIGL SERPL-MCNC: 158 MG/DL (ref 37–140)
TSH SERPL-ACNC: 0.22 UIU/ML (ref 0.35–4.94)
UROBILINOGEN UR STRIP-ACNC: 0.2
VLDLC SERPL CALC-MCNC: 32 MG/DL
WBC # SPEC AUTO: 8.3 X10(3)/MCL (ref 4.5–11.5)

## 2023-12-15 PROCEDURE — 81003 URINALYSIS AUTO W/O SCOPE: CPT

## 2023-12-15 PROCEDURE — 85025 COMPLETE CBC W/AUTO DIFF WBC: CPT

## 2023-12-15 PROCEDURE — 80061 LIPID PANEL: CPT

## 2023-12-15 PROCEDURE — 82306 VITAMIN D 25 HYDROXY: CPT

## 2023-12-15 PROCEDURE — 84443 ASSAY THYROID STIM HORMONE: CPT

## 2023-12-15 PROCEDURE — 86803 HEPATITIS C AB TEST: CPT

## 2023-12-15 PROCEDURE — 36415 COLL VENOUS BLD VENIPUNCTURE: CPT

## 2023-12-15 PROCEDURE — 83036 HEMOGLOBIN GLYCOSYLATED A1C: CPT

## 2023-12-15 PROCEDURE — 80053 COMPREHEN METABOLIC PANEL: CPT

## 2023-12-20 ENCOUNTER — TELEPHONE (OUTPATIENT)
Dept: FAMILY MEDICINE | Facility: CLINIC | Age: 68
End: 2023-12-20

## 2023-12-20 ENCOUNTER — OFFICE VISIT (OUTPATIENT)
Dept: FAMILY MEDICINE | Facility: CLINIC | Age: 68
End: 2023-12-20
Payer: COMMERCIAL

## 2023-12-20 VITALS
WEIGHT: 171.75 LBS | TEMPERATURE: 98 F | BODY MASS INDEX: 27.6 KG/M2 | HEIGHT: 66 IN | RESPIRATION RATE: 18 BRPM | OXYGEN SATURATION: 98 % | HEART RATE: 80 BPM | SYSTOLIC BLOOD PRESSURE: 111 MMHG | DIASTOLIC BLOOD PRESSURE: 75 MMHG

## 2023-12-20 DIAGNOSIS — K22.70 BARRETT'S ESOPHAGUS WITHOUT DYSPLASIA: ICD-10-CM

## 2023-12-20 DIAGNOSIS — I65.23 BILATERAL CAROTID ARTERY STENOSIS: ICD-10-CM

## 2023-12-20 DIAGNOSIS — Z90.2 HISTORY OF LOBECTOMY OF LUNG: Chronic | ICD-10-CM

## 2023-12-20 DIAGNOSIS — E78.2 MIXED HYPERLIPIDEMIA: Chronic | ICD-10-CM

## 2023-12-20 DIAGNOSIS — Z23 NEED FOR INFLUENZA VACCINATION: ICD-10-CM

## 2023-12-20 DIAGNOSIS — F32.5 MAJOR DEPRESSIVE DISORDER IN FULL REMISSION, UNSPECIFIED WHETHER RECURRENT: ICD-10-CM

## 2023-12-20 DIAGNOSIS — F41.9 ANXIETY: ICD-10-CM

## 2023-12-20 DIAGNOSIS — K90.0 CELIAC DISEASE: ICD-10-CM

## 2023-12-20 DIAGNOSIS — Z86.010 PERSONAL HISTORY OF COLONIC POLYPS: ICD-10-CM

## 2023-12-20 DIAGNOSIS — R56.9 SEIZURES: ICD-10-CM

## 2023-12-20 DIAGNOSIS — M81.0 AGE-RELATED OSTEOPOROSIS WITHOUT CURRENT PATHOLOGICAL FRACTURE: ICD-10-CM

## 2023-12-20 DIAGNOSIS — R42 VERTIGO: ICD-10-CM

## 2023-12-20 DIAGNOSIS — E55.9 VITAMIN D DEFICIENCY: ICD-10-CM

## 2023-12-20 DIAGNOSIS — R73.03 PREDIABETES: ICD-10-CM

## 2023-12-20 DIAGNOSIS — E89.0 POSTOPERATIVE HYPOTHYROIDISM: ICD-10-CM

## 2023-12-20 DIAGNOSIS — Z91.89 PNEUMOCOCCAL VACCINATION INDICATED: ICD-10-CM

## 2023-12-20 DIAGNOSIS — Z00.00 ENCOUNTER FOR WELLNESS EXAMINATION: Primary | ICD-10-CM

## 2023-12-20 DIAGNOSIS — F41.0 PANIC ATTACK: Chronic | ICD-10-CM

## 2023-12-20 DIAGNOSIS — Z12.31 ENCOUNTER FOR SCREENING MAMMOGRAM FOR BREAST CANCER: ICD-10-CM

## 2023-12-20 DIAGNOSIS — Z86.73 HISTORY OF TRANSIENT ISCHEMIC ATTACK: Chronic | ICD-10-CM

## 2023-12-20 DIAGNOSIS — I10 PRIMARY HYPERTENSION: Chronic | ICD-10-CM

## 2023-12-20 DIAGNOSIS — Z85.118 HISTORY OF LUNG CANCER IN ADULTHOOD: ICD-10-CM

## 2023-12-20 DIAGNOSIS — I67.82 CHRONIC CEREBRAL ISCHEMIA: ICD-10-CM

## 2023-12-20 PROBLEM — M85.80 OSTEOPENIA AFTER MENOPAUSE: Status: RESOLVED | Noted: 2022-12-07 | Resolved: 2023-12-20

## 2023-12-20 PROBLEM — Z78.0 OSTEOPENIA AFTER MENOPAUSE: Status: RESOLVED | Noted: 2022-12-07 | Resolved: 2023-12-20

## 2023-12-20 PROBLEM — Z86.0100 PERSONAL HISTORY OF COLONIC POLYPS: Status: ACTIVE | Noted: 2023-12-20

## 2023-12-20 PROBLEM — R91.8 LUNG MASS: Status: RESOLVED | Noted: 2022-12-07 | Resolved: 2023-12-20

## 2023-12-20 PROBLEM — F32.A DEPRESSIVE DISORDER: Status: RESOLVED | Noted: 2022-12-07 | Resolved: 2023-12-20

## 2023-12-20 PROBLEM — E78.5 HYPERLIPIDEMIA: Chronic | Status: ACTIVE | Noted: 2022-06-06

## 2023-12-20 PROCEDURE — 1160F PR REVIEW ALL MEDS BY PRESCRIBER/CLIN PHARMACIST DOCUMENTED: ICD-10-PCS | Mod: CPTII,,, | Performed by: FAMILY MEDICINE

## 2023-12-20 PROCEDURE — 90694 FLU VACCINE - QUADRIVALENT - ADJUVANTED: ICD-10-PCS | Mod: ,,, | Performed by: FAMILY MEDICINE

## 2023-12-20 PROCEDURE — 1160F RVW MEDS BY RX/DR IN RCRD: CPT | Mod: CPTII,,, | Performed by: FAMILY MEDICINE

## 2023-12-20 PROCEDURE — 99397 PER PM REEVAL EST PAT 65+ YR: CPT | Mod: 25,,, | Performed by: FAMILY MEDICINE

## 2023-12-20 PROCEDURE — 90694 VACC AIIV4 NO PRSRV 0.5ML IM: CPT | Mod: ,,, | Performed by: FAMILY MEDICINE

## 2023-12-20 PROCEDURE — 3288F PR FALLS RISK ASSESSMENT DOCUMENTED: ICD-10-PCS | Mod: CPTII,,, | Performed by: FAMILY MEDICINE

## 2023-12-20 PROCEDURE — 3008F BODY MASS INDEX DOCD: CPT | Mod: CPTII,,, | Performed by: FAMILY MEDICINE

## 2023-12-20 PROCEDURE — 90677 PNEUMOCOCCAL CONJUGATE VACCINE 20-VALENT: ICD-10-PCS | Mod: ,,, | Performed by: FAMILY MEDICINE

## 2023-12-20 PROCEDURE — 90471 FLU VACCINE - QUADRIVALENT - ADJUVANTED: ICD-10-PCS | Mod: ,,, | Performed by: FAMILY MEDICINE

## 2023-12-20 PROCEDURE — 99213 OFFICE O/P EST LOW 20 MIN: CPT | Mod: 25,,, | Performed by: FAMILY MEDICINE

## 2023-12-20 PROCEDURE — 1101F PT FALLS ASSESS-DOCD LE1/YR: CPT | Mod: CPTII,,, | Performed by: FAMILY MEDICINE

## 2023-12-20 PROCEDURE — 90472 IMMUNIZATION ADMIN EACH ADD: CPT | Mod: ,,, | Performed by: FAMILY MEDICINE

## 2023-12-20 PROCEDURE — 90471 IMMUNIZATION ADMIN: CPT | Mod: ,,, | Performed by: FAMILY MEDICINE

## 2023-12-20 PROCEDURE — 1101F PR PT FALLS ASSESS DOC 0-1 FALLS W/OUT INJ PAST YR: ICD-10-PCS | Mod: CPTII,,, | Performed by: FAMILY MEDICINE

## 2023-12-20 PROCEDURE — 3044F HG A1C LEVEL LT 7.0%: CPT | Mod: CPTII,,, | Performed by: FAMILY MEDICINE

## 2023-12-20 PROCEDURE — 1125F AMNT PAIN NOTED PAIN PRSNT: CPT | Mod: CPTII,,, | Performed by: FAMILY MEDICINE

## 2023-12-20 PROCEDURE — 3044F PR MOST RECENT HEMOGLOBIN A1C LEVEL <7.0%: ICD-10-PCS | Mod: CPTII,,, | Performed by: FAMILY MEDICINE

## 2023-12-20 PROCEDURE — 1159F MED LIST DOCD IN RCRD: CPT | Mod: CPTII,,, | Performed by: FAMILY MEDICINE

## 2023-12-20 PROCEDURE — 3008F PR BODY MASS INDEX (BMI) DOCUMENTED: ICD-10-PCS | Mod: CPTII,,, | Performed by: FAMILY MEDICINE

## 2023-12-20 PROCEDURE — 3074F SYST BP LT 130 MM HG: CPT | Mod: CPTII,,, | Performed by: FAMILY MEDICINE

## 2023-12-20 PROCEDURE — 3078F PR MOST RECENT DIASTOLIC BLOOD PRESSURE < 80 MM HG: ICD-10-PCS | Mod: CPTII,,, | Performed by: FAMILY MEDICINE

## 2023-12-20 PROCEDURE — 99213 PR OFFICE/OUTPT VISIT, EST, LEVL III, 20-29 MIN: ICD-10-PCS | Mod: 25,,, | Performed by: FAMILY MEDICINE

## 2023-12-20 PROCEDURE — 3078F DIAST BP <80 MM HG: CPT | Mod: CPTII,,, | Performed by: FAMILY MEDICINE

## 2023-12-20 PROCEDURE — 90677 PCV20 VACCINE IM: CPT | Mod: ,,, | Performed by: FAMILY MEDICINE

## 2023-12-20 PROCEDURE — 3288F FALL RISK ASSESSMENT DOCD: CPT | Mod: CPTII,,, | Performed by: FAMILY MEDICINE

## 2023-12-20 PROCEDURE — 90472 PNEUMOCOCCAL CONJUGATE VACCINE 20-VALENT: ICD-10-PCS | Mod: ,,, | Performed by: FAMILY MEDICINE

## 2023-12-20 PROCEDURE — 1159F PR MEDICATION LIST DOCUMENTED IN MEDICAL RECORD: ICD-10-PCS | Mod: CPTII,,, | Performed by: FAMILY MEDICINE

## 2023-12-20 PROCEDURE — 99397 PR PREVENTIVE VISIT,EST,65 & OVER: ICD-10-PCS | Mod: 25,,, | Performed by: FAMILY MEDICINE

## 2023-12-20 PROCEDURE — 1125F PR PAIN SEVERITY QUANTIFIED, PAIN PRESENT: ICD-10-PCS | Mod: CPTII,,, | Performed by: FAMILY MEDICINE

## 2023-12-20 PROCEDURE — 3074F PR MOST RECENT SYSTOLIC BLOOD PRESSURE < 130 MM HG: ICD-10-PCS | Mod: CPTII,,, | Performed by: FAMILY MEDICINE

## 2023-12-20 RX ORDER — MECLIZINE HYDROCHLORIDE 25 MG/1
25 TABLET ORAL 2 TIMES DAILY PRN
COMMUNITY
Start: 2023-12-18 | End: 2023-12-20

## 2023-12-20 RX ORDER — AMOXICILLIN AND CLAVULANATE POTASSIUM 875; 125 MG/1; MG/1
1 TABLET, FILM COATED ORAL 2 TIMES DAILY
COMMUNITY
Start: 2023-12-18 | End: 2024-03-20

## 2023-12-20 RX ORDER — METFORMIN HYDROCHLORIDE 500 MG/1
500 TABLET ORAL 2 TIMES DAILY WITH MEALS
Qty: 180 TABLET | Refills: 3 | Status: SHIPPED | OUTPATIENT
Start: 2023-12-20 | End: 2024-03-20

## 2023-12-20 NOTE — PROGRESS NOTES
"  Patient ID: [id number]     Chief Complaint: Annual Exam (Wellness with lab results) and Extremity Weakness (Pt is c/o LE weakness, states that she feels "woozy". Has been to urgent care x2 and her cardiologist. Cardiologist is referring to ENT)        HPI:     Jacklyn Appiah is a 68 y.o. female here today for an annual wellness visit. Reviewed and discussed lab results.     As a separate em visit, She c/o dizziness and feeling off balance. She has had it 3x in the past month. It comes in spurts and lasts a few hours. She has meclizine and will be seeing an ENT. Also c/o blurred vision and is overdue for an eye exam. Of note, labs show a fasting glucose of 132 and an A1c of 6.0. I discussed the diagnosis of prediabetes and the side effects of metformin. Patient agrees to start metformin.             ----------------------------  Acute anal fissure  Adenocarcinoma of left lung  Anxiety disorder, unspecified  Mukherjee's esophagus  Bilateral stenosis of carotid arteries greater than 50%  Celiac disease  Chronic cerebral ischemia  Esophageal spasm  Family history of breast cancer in mother  GERD (gastroesophageal reflux disease)  Heart attack  Heart disease  History of cervical cancer  History of lobectomy of lung  History of TIA (transient ischemic attack)  Hyperlipidemia  Hypertension  Hypothyroidism, postsurgical  Irregular heart beat  Long-term use of high-risk medication  Lumbar radiculopathy  Lumbar spinal stenosis  Lung mass  Major depression in complete remission  Migraine with aura  Osteopenia  Osteopenia after menopause  Painful defecation  Panic disorder (episodic paroxysmal anxiety)  Personal history of colonic polyps  Rectal bleeding  Restless leg syndrome  Seizures  Spinal stenosis of lumbar region with radiculopathy  Statin-induced myositis  Stroke  Thyroid disease  Vitamin D deficiency     Past Surgical History:   Procedure Laterality Date    APPENDECTOMY  1992    CERVICAL BIOPSY  1979    CHOLECYSTECTOMY "  1992    COLONOSCOPY  10/15/2018    Dr Leander Rizo    COLONOSCOPY W/ BIOPSIES AND POLYPECTOMY  02/10/2022    Dr Boo Friend    EGD, WITH BALLOON DILATION  12/21/2020    Dr Leander Rizo    ESOPHAGOGASTRODUODENOSCOPY  05/18/2016    Dr Leander Rizo    THORACOSCOPY  03/19/2021    Dr Marie All    TONSILLECTOMY, ADENOIDECTOMY  1968    TOTAL ABDOMINAL HYSTERECTOMY  1988    TOTAL THYROIDECTOMY  05/2000    TOTAL THYROIDECTOMY  04/2000       Review of patient's allergies indicates:   Allergen Reactions    Gluten Rash       Outpatient Medications Marked as Taking for the 12/20/23 encounter (Office Visit) with Daniela Ward MD   Medication Sig Dispense Refill    ALPRAZolam (XANAX) 0.5 MG tablet Take 0.5 mg by mouth nightly.      amoxicillin-clavulanate 875-125mg (AUGMENTIN) 875-125 mg per tablet Take 1 tablet by mouth 2 (two) times daily.      aspirin (ECOTRIN) 81 MG EC tablet Take 81 mg by mouth once daily.      calcium/magnesium (CALCIUM AND MAGNESIUM ORAL)       diltiaZEM (CARDIZEM CD) 120 MG Cp24 Take 120 mg by mouth once daily.      ergocalciferol (ERGOCALCIFEROL) 50,000 unit Cap Take 50,000 Units by mouth every 7 days.      famotidine (PEPCID) 40 MG tablet Take 1 tablet by mouth every evening.      fexofenadine (ALLEGRA) 180 MG tablet Take 180 mg by mouth once daily.      hyoscyamine (LEVSIN/SL) 0.125 mg Subl Place 1 tablet (0.125 mg total) under the tongue every 4 (four) hours as needed. 24 tablet 0    levothyroxine (SYNTHROID) 100 MCG tablet Take 1 tablet (100 mcg total) by mouth before breakfast. 30 tablet 11    metoprolol succinate (TOPROL-XL) 25 MG 24 hr tablet Take 12.5 mg by mouth nightly.      ondansetron (ZOFRAN) 8 MG tablet Take 1 tablet (8 mg total) by mouth every 8 (eight) hours as needed for Nausea. 30 tablet 1    pantoprazole (PROTONIX) 40 MG tablet Take 40 mg by mouth once daily.      pravastatin (PRAVACHOL) 10 MG tablet TAKE 1 TABLET BY MOUTH EVERY DAY 90 tablet 3    venlafaxine  (EFFEXOR-XR) 75 MG 24 hr capsule TAKE 1 CAPSULE BY MOUTH EVERY DAY 90 capsule 3       Social History     Socioeconomic History    Marital status:    Tobacco Use    Smoking status: Former     Current packs/day: 0.00     Average packs/day: 1.5 packs/day for 15.0 years (22.5 ttl pk-yrs)     Types: Cigarettes     Start date: 3/18/2006     Quit date: 3/18/2021     Years since quittin.7    Smokeless tobacco: Never   Substance and Sexual Activity    Alcohol use: Never    Drug use: Never    Sexual activity: Not Currently     Birth control/protection: Post-menopausal, See Surgical Hx     Social Determinants of Health     Financial Resource Strain: Low Risk  (2023)    Overall Financial Resource Strain (CARDIA)     Difficulty of Paying Living Expenses: Not hard at all   Food Insecurity: No Food Insecurity (2023)    Hunger Vital Sign     Worried About Running Out of Food in the Last Year: Never true     Ran Out of Food in the Last Year: Never true   Transportation Needs: No Transportation Needs (2023)    PRAPARE - Transportation     Lack of Transportation (Medical): No     Lack of Transportation (Non-Medical): No   Physical Activity: Inactive (2023)    Exercise Vital Sign     Days of Exercise per Week: 0 days     Minutes of Exercise per Session: 0 min   Social Connections: Unknown (2023)    Social Connection and Isolation Panel [NHANES]     Frequency of Communication with Friends and Family: More than three times a week     Frequency of Social Gatherings with Friends and Family: More than three times a week     Marital Status:    Housing Stability: Unknown (2023)    Housing Stability Vital Sign     Unable to Pay for Housing in the Last Year: No     Unstable Housing in the Last Year: No        Family History   Problem Relation Age of Onset    Breast cancer Mother     Diabetes Mellitus Mother     Heart disease Mother     Hyperlipidemia Mother     Hypertension Mother      Kidney disease Mother     Stroke Mother     COPD Mother     Depression Mother     Diabetes Mother     Mental illness Mother     Cancer Mother     Alzheimer's disease Father     Arthritis Father     Cancer Father     Heart attack Brother     Heart disease Brother     Alcohol abuse Brother     Heart disease Maternal Grandmother     Lung cancer Maternal Grandfather     Breast cancer Paternal Grandmother     Cancer Paternal Grandmother         Patient Care Team:  Daniela Ward MD as PCP - General (Family Medicine)  Leander Norman MD as Consulting Physician (Cardiology)  Carolina Wilcox MD (Inactive) as Consulting Physician (Hematology and Oncology)  Ernie Kitchen MD as Consulting Physician (Endocrinology)  Jena Larsen MD as Consulting Physician (Cardiothoracic Surgery)  Boo Friend MD as Consulting Physician (Gastroenterology)  Usha Felder MD as Consulting Physician (Medical Oncology)  Harry Ewing MD as Consulting Physician (Neurology)       Subjective:     ROS    Constitutional: Denies Change in appetite. Denies Chills. Denies Fever. Denies Night sweats.  Eye: Denies changes in vision  ENT: Denies Decreased hearing. Denies Sore throat. Denies Swollen glands.  Respiratory: Denies Cough. Denies Shortness of breath. Denies Shortness of breath with exertion. Denies Wheezing.  Cardiovascular: DeniesChest pain at rest. Denies Chest pain with exertion. Denies Irregular heartbeat. Denies Palpitations. Denies Edema.  Gastrointestinal: Denies Abdominal pain. DeniesDiarrhea. Denies Nausea. Denies Vomiting. Denies Hematemesis or Hematochezia.  Genitourinary: Denies Dysuria. Denies Urinary frequency. Denies Urinary urgency. Denies Blood in urine.  Endocrine: Denies Cold intolerance. Denies Excessive thirst. Denies Heat intolerance. Denies Weight loss. Denies Weight gain.  Musculoskeletal: Denies Painful joints. Denies Weakness.  Integumentary: Denies Rash. Denies Itching. Denies Dry  "skin.  Neurologic: Denies Dizziness. Denies Fainting. Denies Headache.  Psychiatric: Denies Depression. Denies Anxiety. Denies Suicidal/Homicidal ideations.    All Other ROS: Negative except as stated in HPI.       Objective:     /75 (BP Location: Left arm, Patient Position: Sitting, BP Method: Large (Automatic))   Pulse 80   Temp 98.1 °F (36.7 °C) (Oral)   Resp 18   Ht 5' 6" (1.676 m)   Wt 77.9 kg (171 lb 12.5 oz)   SpO2 98%   BMI 27.73 kg/m²     Physical Exam    General: Alert and oriented, No acute distress.  Head: Normocephalic, Atraumatic.  Eye: Pupils are equal, round and reactive to light, Extraocular movements are intact, Sclera non-icteric.  Ears/Nose/Throat: TM+ light reflexes bilaterally. Normal, Mucosa moist,Clear. Teeth intact, no lesions of tongue, palate, mucosa.  Respiratory: Clear to auscultation bilaterally; No wheezes, rales or rhonchi, Non-labored respirations, Symmetrical chest wall expansion.  Cardiovascular: Regular rate and rhythm, S1/S2 normal, No murmurs, rubs or gallops.  Gastrointestinal: Soft, Non-tender, Non-distended, Normal bowel sounds, No palpable organomegaly.  Integumentary: Warm, Dry, Intact, No suspicious lesions or rashes.  Extremities: No clubbing, cyanosis or edema  Psychiatric: Normal interaction, Coherent speech, Euthymic mood, Appropriate affect       Assessment:     Problem List Items Addressed This Visit          Neuro    Chronic cerebral ischemia (Chronic)    Overview     On pravastatin and aspirin. Hx of TIA    Continue current Rx meds           History of transient ischemic attack (Chronic)    Overview     On pravastatin and aspirin.     Continue current Rx meds           Seizures       Psychiatric    Anxiety (Chronic)    Overview     On effexor and xanax    Continue current Rx meds             Major depression in complete remission (Chronic)    Overview     Well controlled On effexor and xanax. Denies si/hi.     Continue current Rx meds           Panic " attack (Chronic)    Overview     Well controlled with prn xanax.    Continue current Rx meds              Pulmonary    History of lobectomy of lung (Chronic)    Overview     2/2 lung cancer. Followed by oncology. Quit smoking.             Cardiac/Vascular    Hypertension (Chronic)    Overview     At goal on current meds. Asymptomatic    Continue current Rx meds           Relevant Orders    Urinalysis    Bilateral carotid artery stenosis    Overview     Sees Dr. Norman. ON cardizem, asymptomatic         Mixed hyperlipidemia    Overview     On pravastatin, doing well    Continue current Rx meds           Relevant Orders    CBC Auto Differential    Comprehensive Metabolic Panel    Lipid Panel       Oncology    History of lung cancer in adulthood    Overview     S/p lobectomy. Quit smoking. Doing well. Followed by oncology    Continue current Rx meds              Endocrine    Age-related osteoporosis without current pathological fracture (Chronic)    Overview     Of lumbar spine. Unable to tolerate fosamax due to severe heartburn. On vitamin d3 and calcium.     Patient would like to try prolia. Will send in prolia         Postoperative hypothyroidism    Overview     s/p thyroidectomy for thyroid cancer, on synthroid.  She does see Dr. Diaz    Defer management to Dr. Kitchen             Relevant Orders    TSH    Vitamin D deficiency    Overview     Resolved on weekly ergocalciferol    Continue current Rx meds           Prediabetes    Overview     A1c 6.0 with a fasting sugar of 132.    Adhere to a low carb/sugar diet that is also low in fat, but high in protein. Eat foods such as baked chicken, turkey, low fat ground beef, fish. Increase portions of vegetables. Avoid soft drinks, sweet drinks and stick to mainly water.     Start metformin 500 mg bid. Side effects discussed  Check sugar fasting daily  Rtc 3 mts with labs         Relevant Medications    metFORMIN (GLUCOPHAGE) 500 MG tablet    Other Relevant Orders     Hemoglobin A1C    Lipid Panel    Comprehensive Metabolic Panel    Hemoglobin A1C       GI    Celiac disease (Chronic)    Overview     Sees gi, stable. Not on any meds         Mukherjee's esophagus    Overview     Sees gi. On PPI    Continue current Rx meds           Personal history of colonic polyps    Overview     Last scope 3/22. Repeat 3/27.           Other Visit Diagnoses       Encounter for wellness examination    -  Primary    Relevant Orders    CBC Auto Differential    Comprehensive Metabolic Panel    Lipid Panel    TSH    Hemoglobin A1C    Urinalysis    Vertigo        has ent appt comging up and meclizine    Pneumococcal vaccination indicated        Relevant Orders    Pneumococcal Conjugate Vaccine (20 Valent) (IM)(Preferred)    Encounter for screening mammogram for breast cancer        Relevant Orders    Mammo Digital Screening Bilat w/ Tereso            Plan:         Health Maintenance Topics with due status: Not Due       Topic Last Completion Date    TETANUS VACCINE 05/18/2020    Colorectal Cancer Screening 02/10/2022    Mammogram 04/26/2023    DEXA Scan 11/10/2023    Lipid Panel 12/15/2023        Eye Exam - Recommend annually.    Dental Exam - Recommend biannual exams.     Vaccinations -   Immunization History   Administered Date(s) Administered    COVID-19 Vaccine 08/26/2021, 08/26/2021, 09/23/2021, 09/23/2021, 09/23/2021    COVID-19, MRNA, LN-S, PF (MODERNA FULL 0.5 ML DOSE) 08/26/2021, 09/23/2021    Influenza - Quadrivalent - High Dose - PF (65 years and older) 12/06/2022    Tdap 05/18/2020      Patient was counseled on risks/benefits of receiving immunization. All questions were answered    Perform monthly self breast exams and call me immediately if you find a lump/bump or have any skin/nipple changes  Exercise for a total of 150 min per week and eat a healthy diet  Stay up to date with all cancer screening discussed in visit  Immunizations due were discussed during visit  All health maintenance was  reviewed with patient. Patient verbalized understanding. All questions were answered.     Medication List with Changes/Refills   New Medications    METFORMIN (GLUCOPHAGE) 500 MG TABLET    Take 1 tablet (500 mg total) by mouth 2 (two) times daily with meals.       Start Date: 12/20/2023End Date: 12/19/2024   Current Medications    ALPRAZOLAM (XANAX) 0.5 MG TABLET    Take 0.5 mg by mouth nightly.       Start Date: 5/12/2022 End Date: --    AMOXICILLIN-CLAVULANATE 875-125MG (AUGMENTIN) 875-125 MG PER TABLET    Take 1 tablet by mouth 2 (two) times daily.       Start Date: 12/18/2023End Date: --    ASPIRIN (ECOTRIN) 81 MG EC TABLET    Take 81 mg by mouth once daily.       Start Date: --        End Date: --    CALCIUM/MAGNESIUM (CALCIUM AND MAGNESIUM ORAL)           Start Date: --        End Date: --    DILTIAZEM (CARDIZEM CD) 120 MG CP24    Take 120 mg by mouth once daily.       Start Date: 4/12/2022 End Date: --    ERGOCALCIFEROL (ERGOCALCIFEROL) 50,000 UNIT CAP    Take 50,000 Units by mouth every 7 days.       Start Date: 4/25/2022 End Date: --    FAMOTIDINE (PEPCID) 40 MG TABLET    Take 1 tablet by mouth every evening.       Start Date: --        End Date: --    FEXOFENADINE (ALLEGRA) 180 MG TABLET    Take 180 mg by mouth once daily.       Start Date: --        End Date: --    HYOSCYAMINE (LEVSIN/SL) 0.125 MG SUBL    Place 1 tablet (0.125 mg total) under the tongue every 4 (four) hours as needed.       Start Date: 4/11/2023 End Date: 12/20/2023    LEVOTHYROXINE (SYNTHROID) 100 MCG TABLET    Take 1 tablet (100 mcg total) by mouth before breakfast.       Start Date: 10/18/2022End Date: 12/20/2023    METOPROLOL SUCCINATE (TOPROL-XL) 25 MG 24 HR TABLET    Take 12.5 mg by mouth nightly.       Start Date: 4/19/2022 End Date: --    ONDANSETRON (ZOFRAN) 8 MG TABLET    Take 1 tablet (8 mg total) by mouth every 8 (eight) hours as needed for Nausea.       Start Date: 10/27/2022End Date: --    PANTOPRAZOLE (PROTONIX) 40 MG TABLET     Take 40 mg by mouth once daily.       Start Date: 6/10/2022 End Date: --    PRAVASTATIN (PRAVACHOL) 10 MG TABLET    TAKE 1 TABLET BY MOUTH EVERY DAY       Start Date: 1/23/2023 End Date: --    VENLAFAXINE (EFFEXOR-XR) 75 MG 24 HR CAPSULE    TAKE 1 CAPSULE BY MOUTH EVERY DAY       Start Date: 5/29/2023 End Date: --   Discontinued Medications    ALAHIST CF 2-10-20 MG TAB    Take 1 tablet by mouth every 6 (six) hours.       Start Date: 2/1/2023  End Date: 12/20/2023    ALENDRONATE (FOSAMAX) 70 MG TABLET    Take 1 tablet (70 mg total) by mouth every 7 days.       Start Date: 11/15/2023End Date: 12/20/2023    MECLIZINE (ANTIVERT) 25 MG TABLET    Take 25 mg by mouth 2 (two) times daily as needed for Dizziness.       Start Date: 12/18/2023End Date: 12/20/2023          The patient's Health Maintenance was reviewed and the following appears to be due at this time:   Health Maintenance Due   Topic Date Due    Pneumococcal Vaccines (Age 65+) (1 - PCV) Never done    Shingles Vaccine (1 of 2) Never done    High Dose Statin  Never done    RSV Vaccine (Age 60+ and Pregnant patients) (1 - 1-dose 60+ series) Never done    Influenza Vaccine (1) 09/01/2023    COVID-19 Vaccine (8 - 2023-24 season) 09/01/2023         Follow up for 3 mts prediabetes with labs and one year medicare wellness labs. In addition to their scheduled follow up, the patient has also been instructed to follow up on as needed basis.

## 2023-12-21 DIAGNOSIS — C34.90 MALIGNANT NEOPLASM OF BRONCHUS AND LUNG: Primary | ICD-10-CM

## 2023-12-26 ENCOUNTER — TELEPHONE (OUTPATIENT)
Dept: FAMILY MEDICINE | Facility: CLINIC | Age: 68
End: 2023-12-26
Payer: COMMERCIAL

## 2023-12-26 RX ORDER — ALPRAZOLAM 0.5 MG/1
0.5 TABLET ORAL NIGHTLY
Qty: 30 TABLET | Refills: 2 | Status: SHIPPED | OUTPATIENT
Start: 2023-12-26 | End: 2024-12-25

## 2023-12-26 NOTE — TELEPHONE ENCOUNTER
----- Message from Kalpana Schilling sent at 12/26/2023  1:42 PM CST -----  .Type:  RX Refill Request  Who Called: Jacklyn  Refalysia or New Rx:Refill  RX Name and Strength:ALPRAZolam (XANAX) 0.5 MG tablet  How is the patient currently taking it? (ex. 1XDay):1/day  Is this a 30 day or 90 day RX:30  Preferred Pharmacy with phone number:[facility] [place]  Local or Mail Order:Local  Ordering Provider:Juanis  Would the patient rather a call back or a response via [facility]?   Best Call Back Number:[phone]  Additional Information: ALPRAZolam (XANAX) 0.5 MG tablet

## 2023-12-27 ENCOUNTER — PATIENT MESSAGE (OUTPATIENT)
Dept: INFUSION THERAPY | Facility: HOSPITAL | Age: 68
End: 2023-12-27
Payer: COMMERCIAL

## 2024-01-04 PROBLEM — H90.3 SENSORINEURAL HEARING LOSS (SNHL) OF BOTH EARS: Status: ACTIVE | Noted: 2024-01-04

## 2024-01-11 ENCOUNTER — HOSPITAL ENCOUNTER (OUTPATIENT)
Dept: RADIOLOGY | Facility: HOSPITAL | Age: 69
Discharge: HOME | End: 2024-01-11
Attending: NURSE PRACTITIONER
Payer: COMMERCIAL

## 2024-01-11 DIAGNOSIS — C34.90 MALIGNANT NEOPLASM OF BRONCHUS AND LUNG: ICD-10-CM

## 2024-01-11 PROCEDURE — 74177 CT ABD & PELVIS W/CONTRAST: CPT | Mod: TC

## 2024-01-11 PROCEDURE — 25500020 PHARM REV CODE 255: Mod: 59

## 2024-01-11 RX ADMIN — DIATRIZOATE MEGLUMINE AND DIATRIZOATE SODIUM 30 ML: 660; 100 LIQUID ORAL; RECTAL at 07:01

## 2024-01-11 RX ADMIN — IOHEXOL 100 ML: 350 INJECTION, SOLUTION INTRAVENOUS at 08:01

## 2024-01-20 DIAGNOSIS — E78.2 MIXED HYPERLIPIDEMIA: ICD-10-CM

## 2024-01-22 RX ORDER — PRAVASTATIN SODIUM 10 MG/1
TABLET ORAL
Qty: 90 TABLET | Refills: 3 | Status: SHIPPED | OUTPATIENT
Start: 2024-01-22

## 2024-02-15 ENCOUNTER — INFUSION (OUTPATIENT)
Dept: INFUSION THERAPY | Facility: HOSPITAL | Age: 69
End: 2024-02-15
Attending: FAMILY MEDICINE
Payer: COMMERCIAL

## 2024-02-15 VITALS
RESPIRATION RATE: 18 BRPM | DIASTOLIC BLOOD PRESSURE: 83 MMHG | HEIGHT: 66 IN | SYSTOLIC BLOOD PRESSURE: 123 MMHG | HEART RATE: 94 BPM | WEIGHT: 167 LBS | BODY MASS INDEX: 26.84 KG/M2 | OXYGEN SATURATION: 99 %

## 2024-02-15 DIAGNOSIS — M81.0 AGE-RELATED OSTEOPOROSIS WITHOUT CURRENT PATHOLOGICAL FRACTURE: Primary | ICD-10-CM

## 2024-02-15 PROCEDURE — 96372 THER/PROPH/DIAG INJ SC/IM: CPT

## 2024-02-15 PROCEDURE — 63600175 PHARM REV CODE 636 W HCPCS: Mod: JZ,TB | Performed by: FAMILY MEDICINE

## 2024-02-15 RX ADMIN — DENOSUMAB 60 MG: 60 INJECTION SUBCUTANEOUS at 02:02

## 2024-02-15 NOTE — PROGRESS NOTES
14:41 Patient denies symptoms of reaction to injection. Provided patient with AVS.  Discharge instructions explained, questions answered.  Patient verbalized understanding discharged in stable condition.

## 2024-02-15 NOTE — PROGRESS NOTES
14:15 Provided patient with a copy of education regarding Prolia.  Nurse discussed medication, including the risks of hypocalcemia, osteonecrosis of the jaw, atypical femoral fractures serious infections, and dermatologic reactions associated with Prolia.  Also, explained that the patient's provider has determined that the benefits of Prolia outweigh the risks of her receiving it. Patient verbalized understanding.

## 2024-03-13 ENCOUNTER — LAB VISIT (OUTPATIENT)
Dept: LAB | Facility: HOSPITAL | Age: 69
End: 2024-03-13
Attending: FAMILY MEDICINE
Payer: COMMERCIAL

## 2024-03-13 DIAGNOSIS — R73.03 PREDIABETES: ICD-10-CM

## 2024-03-13 LAB
ALBUMIN SERPL-MCNC: 4 G/DL (ref 3.4–4.8)
ALBUMIN/GLOB SERPL: 1.3 RATIO (ref 1.1–2)
ALP SERPL-CCNC: 130 UNIT/L (ref 40–150)
ALT SERPL-CCNC: 24 UNIT/L (ref 0–55)
AST SERPL-CCNC: 18 UNIT/L (ref 5–34)
BILIRUB SERPL-MCNC: 0.3 MG/DL
BUN SERPL-MCNC: 17 MG/DL (ref 9.8–20.1)
CALCIUM SERPL-MCNC: 9.1 MG/DL (ref 8.4–10.2)
CHLORIDE SERPL-SCNC: 107 MMOL/L (ref 98–107)
CHOLEST SERPL-MCNC: 156 MG/DL
CHOLEST/HDLC SERPL: 4 {RATIO} (ref 0–5)
CO2 SERPL-SCNC: 24 MMOL/L (ref 23–31)
CREAT SERPL-MCNC: 0.77 MG/DL (ref 0.55–1.02)
EST. AVERAGE GLUCOSE BLD GHB EST-MCNC: 122.6 MG/DL
GFR SERPLBLD CREATININE-BSD FMLA CKD-EPI: >60 MLS/MIN/1.73/M2
GLOBULIN SER-MCNC: 3.2 GM/DL (ref 2.4–3.5)
GLUCOSE SERPL-MCNC: 128 MG/DL (ref 82–115)
HBA1C MFR BLD: 5.9 %
HDLC SERPL-MCNC: 40 MG/DL (ref 35–60)
LDLC SERPL CALC-MCNC: 80 MG/DL (ref 50–140)
POTASSIUM SERPL-SCNC: 4.1 MMOL/L (ref 3.5–5.1)
PROT SERPL-MCNC: 7.2 GM/DL (ref 5.8–7.6)
SODIUM SERPL-SCNC: 141 MMOL/L (ref 136–145)
TRIGL SERPL-MCNC: 178 MG/DL (ref 37–140)
VLDLC SERPL CALC-MCNC: 36 MG/DL

## 2024-03-13 PROCEDURE — 80061 LIPID PANEL: CPT

## 2024-03-13 PROCEDURE — 80053 COMPREHEN METABOLIC PANEL: CPT

## 2024-03-13 PROCEDURE — 83036 HEMOGLOBIN GLYCOSYLATED A1C: CPT

## 2024-03-13 PROCEDURE — 36415 COLL VENOUS BLD VENIPUNCTURE: CPT

## 2024-03-19 LAB
LEFT EYE DM RETINOPATHY: NEGATIVE
LEFT EYE DM RETINOPATHY: NEGATIVE
RIGHT EYE DM RETINOPATHY: NEGATIVE
RIGHT EYE DM RETINOPATHY: NEGATIVE

## 2024-03-20 ENCOUNTER — OFFICE VISIT (OUTPATIENT)
Dept: FAMILY MEDICINE | Facility: CLINIC | Age: 69
End: 2024-03-20
Payer: COMMERCIAL

## 2024-03-20 ENCOUNTER — PATIENT OUTREACH (OUTPATIENT)
Dept: ADMINISTRATIVE | Facility: HOSPITAL | Age: 69
End: 2024-03-20
Payer: COMMERCIAL

## 2024-03-20 ENCOUNTER — TELEPHONE (OUTPATIENT)
Dept: FAMILY MEDICINE | Facility: CLINIC | Age: 69
End: 2024-03-20

## 2024-03-20 VITALS
BODY MASS INDEX: 26.54 KG/M2 | HEIGHT: 66 IN | SYSTOLIC BLOOD PRESSURE: 109 MMHG | OXYGEN SATURATION: 98 % | RESPIRATION RATE: 18 BRPM | WEIGHT: 165.13 LBS | HEART RATE: 89 BPM | TEMPERATURE: 98 F | DIASTOLIC BLOOD PRESSURE: 75 MMHG

## 2024-03-20 DIAGNOSIS — E89.2 POSTPROCEDURAL HYPOPARATHYROIDISM: ICD-10-CM

## 2024-03-20 DIAGNOSIS — I10 PRIMARY HYPERTENSION: Primary | Chronic | ICD-10-CM

## 2024-03-20 DIAGNOSIS — E89.0 POSTOPERATIVE HYPOTHYROIDISM: ICD-10-CM

## 2024-03-20 DIAGNOSIS — R56.9 SEIZURES: ICD-10-CM

## 2024-03-20 DIAGNOSIS — E78.2 MIXED HYPERLIPIDEMIA: ICD-10-CM

## 2024-03-20 DIAGNOSIS — R73.03 PREDIABETES: ICD-10-CM

## 2024-03-20 DIAGNOSIS — E11.65 TYPE 2 DIABETES MELLITUS WITH HYPERGLYCEMIA, WITHOUT LONG-TERM CURRENT USE OF INSULIN: ICD-10-CM

## 2024-03-20 DIAGNOSIS — F32.5 MAJOR DEPRESSIVE DISORDER IN FULL REMISSION, UNSPECIFIED WHETHER RECURRENT: ICD-10-CM

## 2024-03-20 PROCEDURE — 1126F AMNT PAIN NOTED NONE PRSNT: CPT | Mod: CPTII,,, | Performed by: FAMILY MEDICINE

## 2024-03-20 PROCEDURE — 3078F DIAST BP <80 MM HG: CPT | Mod: CPTII,,, | Performed by: FAMILY MEDICINE

## 2024-03-20 PROCEDURE — 3074F SYST BP LT 130 MM HG: CPT | Mod: CPTII,,, | Performed by: FAMILY MEDICINE

## 2024-03-20 PROCEDURE — 99214 OFFICE O/P EST MOD 30 MIN: CPT | Mod: ,,, | Performed by: FAMILY MEDICINE

## 2024-03-20 PROCEDURE — 1159F MED LIST DOCD IN RCRD: CPT | Mod: CPTII,,, | Performed by: FAMILY MEDICINE

## 2024-03-20 PROCEDURE — 3008F BODY MASS INDEX DOCD: CPT | Mod: CPTII,,, | Performed by: FAMILY MEDICINE

## 2024-03-20 PROCEDURE — 3288F FALL RISK ASSESSMENT DOCD: CPT | Mod: CPTII,,, | Performed by: FAMILY MEDICINE

## 2024-03-20 PROCEDURE — 1160F RVW MEDS BY RX/DR IN RCRD: CPT | Mod: CPTII,,, | Performed by: FAMILY MEDICINE

## 2024-03-20 PROCEDURE — 1101F PT FALLS ASSESS-DOCD LE1/YR: CPT | Mod: CPTII,,, | Performed by: FAMILY MEDICINE

## 2024-03-20 PROCEDURE — 3044F HG A1C LEVEL LT 7.0%: CPT | Mod: CPTII,,, | Performed by: FAMILY MEDICINE

## 2024-03-20 RX ORDER — METFORMIN HYDROCHLORIDE 1000 MG/1
1000 TABLET ORAL 2 TIMES DAILY WITH MEALS
Qty: 180 TABLET | Refills: 3 | Status: SHIPPED | OUTPATIENT
Start: 2024-03-20 | End: 2025-03-20

## 2024-03-20 NOTE — PROGRESS NOTES
Health Maintenance Topic(s) Outreach Outcomes & Actions Taken:    Eye Exam - Outreach Outcomes & Actions Taken  : External Records Requested & Care Team Updated if Applicable and MYRANDA sent to Dr. Abdoulaye Aviles     Additional Notes:

## 2024-03-20 NOTE — LETTER
AUTHORIZATION FOR RELEASE OF   CONFIDENTIAL INFORMATION    Dear Dr. Aviles, Fax [phone]    We are seeing Jacklyn Appiah, date of birth 1955, in the clinic at [facility]. Daniela Ward MD is the patient's PCP. Jacklyn Appiah has an outstanding lab/procedure at the time we reviewed her chart. In order to help keep her health information updated, she has authorized us to request the following medical record(s):        (  )  MAMMOGRAM                                      (  )  COLONOSCOPY      (  )  PAP SMEAR                                          (  )  OUTSIDE LAB RESULTS     (  )  DEXA SCAN                                          ( X )  EYE EXAM            (  )  FOOT EXAM                                          (  )  ENTIRE RECORD     (  )  OUTSIDE IMMUNIZATIONS                 (  )  _______________         Please fax records to [facility], Bienvenu-Oubre, Shauna, MD, [phone] or [phone].       If you have any questions you can contact Lenka Castrejon at [phone].           Patient Name: Jacklyn Appiah  : 1955  Patient Phone #: [phone]

## 2024-03-20 NOTE — PROGRESS NOTES
Jacklyn Appiah  03/20/2024  [id number]    Daniela Ward MD  Patient Care Team:  Daniela Ward MD as PCP - General (Family Medicine)  Leander Norman MD as Consulting Physician (Cardiology)  Carolina Wilcox MD (Inactive) as Consulting Physician (Hematology and Oncology)  Ernie Kitchen MD as Consulting Physician (Endocrinology)  Jena Larsen MD as Consulting Physician (Cardiothoracic Surgery)  Boo Friend MD as Consulting Physician (Gastroenterology)  Usha Felder MD as Consulting Physician (Medical Oncology)  Harry Ewing MD as Consulting Physician (Neurology)  Valorie Iqbal MD (Otolaryngology)      Chief Complaint:  Chief Complaint   Patient presents with    Follow-up     3 month f/u for prediabetes       History of Present Illness:    69 y.o. female who presents today for prediabetes f/u with labs. Labs reviewed by me and were discussed with patient. All questions regarding lab results were answered. Fasting sugars are 150-200's.       Review of Systems  General: denies f/c, weight loss, night sweats, decreased appetite  Eye: denies blurred vision, changes in vision  Respiratory: denies sob, wheezing, cough  Cardiovascular: denies chest pain, palpitations, edema  Gastrointestinal: denies abdominal pain, n/v, constipation, diarrhea  Integumentary: denies rashes, pruritis    Past Medical History  Past Medical History:   Diagnosis Date    Acute anal fissure     Adenocarcinoma of left lung     Anxiety disorder, unspecified     Mukherjee's esophagus     Bilateral stenosis of carotid arteries greater than 50%     Celiac disease     Chronic cerebral ischemia     Diabetes mellitus, type 2     Esophageal spasm     Family history of breast cancer in mother     GERD (gastroesophageal reflux disease)     Heart attack     Heart disease     History of cervical cancer     History of lobectomy of lung     History of TIA (transient ischemic attack)     Hyperlipidemia      Hypertension     Hypothyroidism, postsurgical     Irregular heart beat     Long-term use of high-risk medication     Lumbar radiculopathy     Lumbar spinal stenosis     Lung mass     Major depression in complete remission     Migraine with aura     Osteopenia     Osteopenia after menopause     Painful defecation     Panic disorder (episodic paroxysmal anxiety)     Personal history of colonic polyps 02/10/2022    Rectal bleeding     Restless leg syndrome     Seizures     Spinal stenosis of lumbar region with radiculopathy     Statin-induced myositis     Stroke     Thyroid disease     Vitamin D deficiency        Medications  Medication List with Changes/Refills   New Medications    METFORMIN (GLUCOPHAGE) 1000 MG TABLET    Take 1 tablet (1,000 mg total) by mouth 2 (two) times daily with meals.   Current Medications    ALPRAZOLAM (XANAX) 0.5 MG TABLET    Take 1 tablet (0.5 mg total) by mouth nightly.    ASPIRIN (ECOTRIN) 81 MG EC TABLET    Take 81 mg by mouth once daily.    CALCIUM/MAGNESIUM (CALCIUM AND MAGNESIUM ORAL)        DILTIAZEM (CARDIZEM CD) 120 MG CP24    Take 120 mg by mouth once daily.    ERGOCALCIFEROL (ERGOCALCIFEROL) 50,000 UNIT CAP    Take 50,000 Units by mouth every 7 days.    FAMOTIDINE (PEPCID) 40 MG TABLET    Take 1 tablet by mouth every evening.    FEXOFENADINE (ALLEGRA) 180 MG TABLET    Take 180 mg by mouth once daily.    HYOSCYAMINE (LEVSIN/SL) 0.125 MG SUBL    Place 1 tablet (0.125 mg total) under the tongue every 4 (four) hours as needed.    LEVOTHYROXINE (SYNTHROID) 100 MCG TABLET    Take 1 tablet (100 mcg total) by mouth before breakfast.    METOPROLOL SUCCINATE (TOPROL-XL) 25 MG 24 HR TABLET    Take 12.5 mg by mouth nightly.    ONDANSETRON (ZOFRAN) 8 MG TABLET    Take 1 tablet (8 mg total) by mouth every 8 (eight) hours as needed for Nausea.    PANTOPRAZOLE (PROTONIX) 40 MG TABLET    Take 40 mg by mouth once daily.    PRAVASTATIN (PRAVACHOL) 10 MG TABLET    TAKE 1 TABLET BY MOUTH EVERY DAY     "VENLAFAXINE (EFFEXOR-XR) 75 MG 24 HR CAPSULE    TAKE 1 CAPSULE BY MOUTH EVERY DAY   Discontinued Medications    AMOXICILLIN-CLAVULANATE 875-125MG (AUGMENTIN) 875-125 MG PER TABLET    Take 1 tablet by mouth 2 (two) times daily.    METFORMIN (GLUCOPHAGE) 500 MG TABLET    Take 1 tablet (500 mg total) by mouth 2 (two) times daily with meals.       Past Surgical History:   Procedure Laterality Date    APPENDECTOMY  1992    CERVICAL BIOPSY  1979    CHOLECYSTECTOMY  1992    COLONOSCOPY  10/15/2018    Dr Leander iRzo    COLONOSCOPY W/ BIOPSIES AND POLYPECTOMY  02/10/2022    Dr Boo Friend    EGD, WITH BALLOON DILATION  12/21/2020    Dr Leander Rizo    ESOPHAGOGASTRODUODENOSCOPY  05/18/2016    Dr Leander Rizo    THORACOSCOPY  03/19/2021    Dr Frank Larsen    TONSILLECTOMY, ADENOIDECTOMY  1968    TOTAL ABDOMINAL HYSTERECTOMY  1988    TOTAL THYROIDECTOMY  05/2000    TOTAL THYROIDECTOMY  04/2000       SUBJECTIVE:  Health Maintenance  Health Maintenance Topics with due status: Not Due       Topic Last Completion Date    TETANUS VACCINE 05/18/2020    Colorectal Cancer Screening 02/10/2022    DEXA Scan 11/10/2023    Hemoglobin A1c (Prediabetes) 03/13/2024    Lipid Panel 03/13/2024     Health Maintenance Due   Topic Date Due    Shingles Vaccine (1 of 2) Never done    High Dose Statin  Never done    RSV Vaccine (Age 60+ and Pregnant patients) (1 - 1-dose 60+ series) Never done    COVID-19 Vaccine (8 - 2023-24 season) 09/01/2023    Mammogram  04/26/2024       Exam:  Vitals:    03/20/24 1329   BP: 109/75   BP Location: Left arm   Patient Position: Sitting   BP Method: Large (Automatic)   Pulse: 89   Resp: 18   Temp: 98.4 °F (36.9 °C)   TempSrc: Oral   SpO2: 98%   Weight: 74.9 kg (165 lb 1.6 oz)   Height: 5' 6" (1.676 m)     Weight: 74.9 kg (165 lb 1.6 oz)   Body mass index is 26.65 kg/m².      Physical Exam  Constitutional: NAD, alert, pleasant  Respiratory: No accessory muscle use, no cough or audible wheezing  Eyes: EOMI, " no conjunctivitis   Integumentary: warm, dry, intact  Psych: AA&Ox3, answers questions appropriately        ICD-10-CM ICD-9-CM   1. Primary hypertension  I10 401.9   2. Prediabetes  R73.03 790.29   3. Mixed hyperlipidemia  E78.2 272.2   4. Postprocedural hypoparathyroidism  E89.2 252.1   5. Seizures  R56.9 780.39   6. Major depressive disorder in full remission, unspecified whether recurrent  F32.5 296.26   7. Type 2 diabetes mellitus with hyperglycemia, without long-term current use of insulin  E11.65 250.00     790.29   8. Postoperative hypothyroidism  E89.0 244.0       1. Primary hypertension  Overview:  At goal on current meds. Asymptomatic    Continue current Rx meds        2. Prediabetes  Overview:  A1c down from 6.0 to 5.9. On metformin 500 mg bid. Has lost 7 lbs. Fasting sugars 150's and up.    Adhere to a low carb/sugar diet that is also low in fat, but high in protein. Eat foods such as baked chicken, turkey, low fat ground beef, fish. Increase portions of vegetables. Avoid soft drinks, sweet drinks and stick to mainly water.     Increase metformin to 1000 mg bid  Refer to diabetes education  Rtc 3 mts with labs    Orders:  -     metFORMIN (GLUCOPHAGE) 1000 MG tablet; Take 1 tablet (1,000 mg total) by mouth 2 (two) times daily with meals.  Dispense: 180 tablet; Refill: 3    3. Mixed hyperlipidemia  Overview:  On pravastatin, doing well. Ldl at goal.     Continue current Rx meds        4. Postprocedural hypoparathyroidism  Overview:  On calcium supplements. Calcium at goal     Continue current Rx meds      Orders:  -     PTH, Intact; Future; Expected date: 06/20/2024    5. Seizures    6. Major depressive disorder in full remission, unspecified whether recurrent  Overview:  Well controlled On effexor and xanax. Denies si/hi.     Continue current Rx meds        7. Type 2 diabetes mellitus with hyperglycemia, without long-term current use of insulin  Overview:  A1c down from 6.0 to 5.9. On metformin 500 mg  bid. Has lost 7 lbs. Fasting sugars 150's and up.    Adhere to a low carb/sugar diet that is also low in fat, but high in protein. Eat foods such as baked chicken, turkey, low fat ground beef, fish. Increase portions of vegetables. Avoid soft drinks, sweet drinks and stick to mainly water.     Increase metformin to 1000 mg bid  Refer to diabetes education  Rtc 3 mts with labs    Orders:  -     Hemoglobin A1C; Future; Expected date: 06/20/2024  -     Comprehensive Metabolic Panel; Future; Expected date: 06/20/2024  -     Microalbumin/Creatinine Ratio, Urine; Future; Expected date: 06/20/2024  -     Ambulatory referral/consult to Diabetes Education; Future; Expected date: 03/20/2024    8. Postoperative hypothyroidism  Overview:  s/p thyroidectomy for thyroid cancer, on synthroid.  She does see Dr. Diaz    Defer management to Dr. Kitchen               Follow up: No follow-ups on file.      Care Plan/Goals: Reviewed   Goals    None

## 2024-03-22 ENCOUNTER — DOCUMENTATION ONLY (OUTPATIENT)
Dept: FAMILY MEDICINE | Facility: CLINIC | Age: 69
End: 2024-03-22
Payer: COMMERCIAL

## 2024-04-15 ENCOUNTER — PATIENT OUTREACH (OUTPATIENT)
Dept: ADMINISTRATIVE | Facility: HOSPITAL | Age: 69
End: 2024-04-15
Payer: COMMERCIAL

## 2024-04-15 NOTE — PROGRESS NOTES
Health Maintenance Topic(s) Outreach Outcomes & Actions Taken:    Eye Exam - Outreach Outcomes & Actions Taken  : Diabetic Eye External Records Uploaded, Care Team & History Updated if Applicable       Additional Notes:  Upload Diabetic Eye Exam 3/19/2024

## 2024-04-29 ENCOUNTER — HOSPITAL ENCOUNTER (OUTPATIENT)
Dept: RADIOLOGY | Facility: HOSPITAL | Age: 69
Discharge: HOME | End: 2024-04-29
Attending: FAMILY MEDICINE
Payer: COMMERCIAL

## 2024-04-29 DIAGNOSIS — Z12.31 ENCOUNTER FOR SCREENING MAMMOGRAM FOR BREAST CANCER: ICD-10-CM

## 2024-04-29 PROCEDURE — 77067 SCR MAMMO BI INCL CAD: CPT | Mod: 26,,, | Performed by: STUDENT IN AN ORGANIZED HEALTH CARE EDUCATION/TRAINING PROGRAM

## 2024-04-29 PROCEDURE — 77063 BREAST TOMOSYNTHESIS BI: CPT | Mod: 26,,, | Performed by: STUDENT IN AN ORGANIZED HEALTH CARE EDUCATION/TRAINING PROGRAM

## 2024-04-29 PROCEDURE — 77063 BREAST TOMOSYNTHESIS BI: CPT | Mod: TC

## 2024-05-15 DIAGNOSIS — F32.5 MAJOR DEPRESSION IN COMPLETE REMISSION: ICD-10-CM

## 2024-05-16 RX ORDER — VENLAFAXINE HYDROCHLORIDE 75 MG/1
CAPSULE, EXTENDED RELEASE ORAL
Qty: 90 CAPSULE | Refills: 3 | Status: SHIPPED | OUTPATIENT
Start: 2024-05-16

## 2024-05-30 ENCOUNTER — CLINICAL SUPPORT (OUTPATIENT)
Dept: DIABETES | Facility: CLINIC | Age: 69
End: 2024-05-30
Payer: COMMERCIAL

## 2024-05-30 DIAGNOSIS — E11.65 TYPE 2 DIABETES MELLITUS WITH HYPERGLYCEMIA, WITHOUT LONG-TERM CURRENT USE OF INSULIN: ICD-10-CM

## 2024-05-30 PROCEDURE — 99212 OFFICE O/P EST SF 10 MIN: CPT | Mod: PBBFAC | Performed by: DIETITIAN, REGISTERED

## 2024-05-30 PROCEDURE — G0108 DIAB MANAGE TRN  PER INDIV: HCPCS | Mod: PBBFAC | Performed by: DIETITIAN, REGISTERED

## 2024-05-30 NOTE — PROGRESS NOTES
Diabetes Care Specialist Progress Note  Author: Augusta Morales RD  Date: 5/30/2024    Program Intake  Reason for Diabetes Program Visit:: Initial Diabetes Assessment  Current diabetes risk level:: low  In the last 12 months, have you:: none  Continuous Glucose Monitoring  Patient has CGM: No    Lab Results   Component Value Date    HGBA1C 5.9 03/13/2024       Clinical            There is no height or weight on file to calculate BMI.    Patient Health Rating  Compared to other people your age, how would you rate your health?: Fair    Problem Review  Reviewed Problem List with Patient: yes  Active comorbidities affecting diabetes self-care.: yes  Comorbidities: Gastrointestinal Disorder, Cardiovascular Disease    Clinical Assessment  Current Diabetes Treatment: Oral Medication (Metformin 1000mg BID)  Have you ever experienced hypoglycemia (low blood sugar)?: yes  In the last month, how often have you experienced low blood sugar?: other (see comments) (last week after she recovered from a stomach virus)  Are you able to tell when your blood sugar is low?: Yes  What symptoms do you experience?: Dizzy/Light-headed, Shaky, Sweaty, Anxious/nervous  Have you ever been hospitalized because your blood sugar was too low?: no  How do you treat hypoglycemia (low blood sugar)?: 5-6 pieces of hard candy, 1/2 can soda/fruit juice  Have you ever experienced hyperglycemia (high blood sugar)?: yes  Are you able to tell when your blood sugar is high?: Yes  What are your symptoms?: nausea  Have you ever been hospitalized because your blood sugar was high?: no    Medication Information  How do you obtain your medications?: Patient drives  How many days a week do you miss your medications?: Never  Do you sometimes have difficulty refilling your medications?: No  Medication adherence impacting ability to self-manage diabetes?: No    Labs  Do you have regular lab work to monitor your medications?: Yes  Type of Regular Lab Work: A1c  Where  do you get your labs drawn?: [facility]  Lab Compliance Barriers: No    Nutritional Status  Diet: Diabetic diet  Meal Plan 24 Hour Recall: Breakfast, Lunch, Dinner, Snack  Meal Plan 24 Hour Recall - Breakfast: 1.5 cups of cheerios and 1 cup milk  Meal Plan 24 Hour Recall - Lunch: brings leftovers for lunch  Meal Plan 24 Hour Recall - Dinner: small piece of broiled chicken and non-starchy vegetables such as cucumber or asparagus  Meal Plan 24 Hour Recall - Snack: dried apple slices or Atkins bar (stopped eating potato chips and snack cakes). Drinks mostly water and Zero/Diet drinks. Uses stevia in coffee  Change in appetite?: No  Dentation:: Intact  Recent Changes in Weight: Weight Loss (14# since 12/20/23)  Was weight loss intentional or unintentional?: Intentional  Current nutritional status an area of need that is impacting patient's ability to self-manage diabetes?: No    Additional Social History    Support  Does anyone support you with your diabetes care?: yes  Who supports you?: self, son/daughter  Who takes you to your medical appointments?: self  Does the current support meet the patient's needs?: Yes  Is Support an area impacting ability to self-manage diabetes?: No    Access to Mass Media & Technology  Does the patient have access to any of the following devices or technologies?: Smart phone  Media or technology needs impacting ability to self-manage diabetes?: No    Cognitive/Behavioral Health  Alert and Oriented: Yes  Difficulty Thinking: No  Requires Prompting: No  Requires assistance for routine expression?: No  Cognitive or behavioral barriers impacting ability to self-manage diabetes?: No         Communication  Language preference: English  Hearing Problems: No  Vision Problems: No  Communication needs impacting ability to self-manage diabetes?: No    Health Literacy  Preferred Learning Method: Face to Face  How often do you need to have someone help you read instructions, pamphlets, or written material  from your doctor or pharmacy?: Never  Health literacy needs impacting ability to self-manage diabetes?: No      Diabetes Self-Management Skills Assessment    Diabetes Disease Process/Treatment Options  Patient/caregiver able to state what happens when someone has diabetes.: somewhat  Patient/caregiver knows what type of diabetes they have.: no  Patient able to identify at least three risk factors for diabetes.: yes  Identified risk factors:: age over 40, being overweight, family history  Diabetes Disease Process/Treatment Options: Skills Assessment Completed: Yes  Assessment indicates:: Instruction Needed  Area of need?: Yes    Nutrition/Healthy Eating  Method of carbohydrate measurement:: eyeballing/guessing  Patient can identify foods that impact blood sugar.: yes  Patient-identified foods:: fruit/fruit juice, starchy vegetables (corn, peas, beans), starches (bread, pasta, rice, cereal), sweets, soda  Nutrition/Healthy Eating Skills Assessment Completed:: Yes  Assessment indicates:: Instruction Needed  Area of need?: Yes    Physical Activity/Exercise  Patient's daily activity level:: constantly moving (stays active at work at [employer])  Patient formally exercises outside of work.: no  Patient can identify forms of physical activity.: yes  Stated forms of physical activity:: moving to burn calories  Patient can identify reasons why exercise/physical activity is important in diabetes management.: no  Physical Activity/Exercise Skills Assessment Completed: : Yes  Assessment indicates:: Instruction Needed  Area of need?: Yes    Medications  Patient is able to describe current diabetes management routine.: yes  Diabetes management routine:: oral medications  Patient is able to identify current diabetes medications, dosages, and appropriate timing of medications.: yes  Patient understands the purpose of the medications taken for diabetes.: no  Patient reports problems or concerns with current medication regimen.:  no  Medication Skills Assessment Completed:: Yes  Assessment indicates:: Instruction Needed  Area of need?: Yes    Home Blood Glucose Monitoring  Patient states that blood sugar is checked at home daily.: yes  Monitoring Method:: home glucometer  How often do you check your blood sugar?: 4 times a day  When do you check your blood sugar?: Before breakfast, 2 hours after meal, Before bedtime  When you check what is your typical blood sugar range? : . Reports -114 and highest post meal (breakfast) 197.  Blood glucose logs:: no  Blood glucose logs reviewed today?: no  Home Blood Glucose Monitoring Skills Assessment Completed: : Yes  Area of need?: Yes    Acute Complications  Patient is able to identify types of acute complications: Yes  Patient Identified:: Hypoglycemia, Hyperglycemia  Patient is able to state the basic meaning of hypoglycemia?: Yes  Patient can identify general symptoms of hypoglycemia: yes  Patient identified:: sweating, shakiness, anxiety  Able to state proper treatment of hypoglycemia?: yes  Patient identified:: 1/2 can soda/fruit juice, 5-6 pieces of hard candy  Patient is able to state the basic meaning of hyperglycemia?: Yes  Able to state the blood sugar range for hyperglycemia?: yes  Patient stated range:: >180  Patient able to state proper treatment of hyperglycemia?: yes  Patient identified:: increase water intake, monitor blood sugar, take medication as recommended  Patient able to verbalize sick day plan?: yes  Patient-stated sick day plan:: drink sugar-free/calorie containing clear liquids if unable to take solid food, drink more fluids  Acute Complications Skills Assessment Completed: : Yes  Assessment indicates:: Instruction Needed  Area of need?: Yes    Chronic Complications  Patient can identify major chronic complications of diabetes.: yes  Stated chronic complications:: heart disease/heart attack, kidney disease, stroke  Patient can identify ways to prevent or delay  diabetes complications.: yes  Stated ways to prevent complications:: avoiding smoking and other types of tobacco, controlling blood sugar, healthy eating and regular activity, maintaining optimal blood glucose control, controlling cholesterol and triglycerides  Patient is aware that having diabetes increases risk of heart disease?: Yes  Patient able to state risk factors for heart disease?: Yes  Patient stated risk factors for heart disease:: High blood pressure, High cholesterol, Diet, Limited activity, Having diabetes  Patient is taking statin?: Yes  Chronic Complications Skills Assessment Completed: : Yes  Assessment indicates:: Adequate understanding  Area of need?: No    Psychosocial/Coping  Patient can identify ways of coping with chronic disease.: yes  Patient-stated ways of coping with chronic disease:: support from loved ones, spiritual/Restoration practices, other (see comments) (adequate sleep and rest)  Psychosocial/Coping Skills Assessment Completed: : Yes  Assessment indicates:: Adequate understanding  Area of need?: No      Assessment Summary and Plan    Based on today's diabetes care assessment, the following areas of need were identified:          5/30/2024    12:01 AM   Social   Support No   Access to Mass Media/Tech No   Cognitive/Behavioral Health No   Communication No   Health Literacy No            5/30/2024    12:01 AM   Clinical   Medication Adherence No   Lab Compliance No   Nutritional Status No            5/30/2024    12:01 AM   Diabetes Self-Management Skills   Diabetes Disease Process/Treatment Options Yes - Reviewed diabetes pathophysiology, different types of diabetes, signs and symptoms, risk factors and treatment guidelines.    Nutrition/Healthy Eating Yes - see care plan. Reports modifying her diet by limiting high carb foods and sweets. States that Cheerios and milk spikes her glucose to 197. Discussed changing to lower carb milk or milk alternative.   Physical Activity/Exercise Yes  - see care plan   Medication Yes - Discussed MOA, onset, side effects, dosage of meds.   Home Blood Glucose Monitoring Yes - Pt states she is frequently checking her blood sugar 3-5 times per day. Encouraged to discuss the recommended glucose monitoring frequency with PCP due to not being on insulin. Discussed CGM options if she is required to check frequently. Encouraged to contact insurance regarding coverage and discussed that it may not be a covered benefit due to no insulin. Reviewed that Dexcom may have an over-the-counter CGM model this summer.   Acute Complications Yes - Reviewed blood glucose goals, prevention, detection, signs and symptoms, and treatment of hypoglycemia and hyperglycemia, and when to contact the clinic. Encouraged to keep glucose tabs in purse due to recent hypoglycemic event (gluc - 52) while she was shopping. States she gets symptomatic if < 90. States that her glucose spikes after breakfast and she gets nauseated. She is unsure if nausea is her glucose or stress due to her job. It does not happen on her days off.    Chronic Complications No   Psychosocial/Coping No          Today's interventions were provided through individual discussion, instruction, and written materials were provided.      Patient verbalized understanding of instruction and written materials.  Pt was able to return back demonstration of instructions today. Patient understood key points, needs reinforcement and further instruction.     Diabetes Self-Management Care Plan:    Today's Diabetes Self-Management Care Plan was developed with Jacklyn's input. Jacklyn has agreed to work toward the following goal(s) to improve his/her overall diabetes control.      There are no recently modified care plans to display for this patient.      Follow Up Plan     Follow up if symptoms worsen or fail to improve.    Today's care plan and follow up schedule was discussed with patient.  Jacklyn verbalized understanding of the care  plan, goals, and agrees to follow up plan.        The patient was encouraged to communicate with his/her health care provider/physician and care team regarding his/her condition(s) and treatment.  I provided the patient with my contact information today and encouraged to contact me via phone or [facility] Patient Portal as needed.     Length of Visit   Total Time: 90 Minutes

## 2024-06-19 ENCOUNTER — LAB VISIT (OUTPATIENT)
Dept: LAB | Facility: HOSPITAL | Age: 69
End: 2024-06-19
Attending: FAMILY MEDICINE
Payer: COMMERCIAL

## 2024-06-19 DIAGNOSIS — E89.2 POSTPROCEDURAL HYPOPARATHYROIDISM: ICD-10-CM

## 2024-06-19 DIAGNOSIS — E11.65 TYPE 2 DIABETES MELLITUS WITH HYPERGLYCEMIA, WITHOUT LONG-TERM CURRENT USE OF INSULIN: ICD-10-CM

## 2024-06-19 LAB
ALBUMIN SERPL-MCNC: 4 G/DL (ref 3.4–4.8)
ALBUMIN/GLOB SERPL: 1.4 RATIO (ref 1.1–2)
ALP SERPL-CCNC: 84 UNIT/L (ref 40–150)
ALT SERPL-CCNC: 21 UNIT/L (ref 0–55)
ANION GAP SERPL CALC-SCNC: 7 MEQ/L
AST SERPL-CCNC: 16 UNIT/L (ref 5–34)
BILIRUB SERPL-MCNC: 0.2 MG/DL
BUN SERPL-MCNC: 12 MG/DL (ref 9.8–20.1)
CALCIUM SERPL-MCNC: 9.2 MG/DL (ref 8.4–10.2)
CHLORIDE SERPL-SCNC: 109 MMOL/L (ref 98–107)
CO2 SERPL-SCNC: 26 MMOL/L (ref 23–31)
CREAT SERPL-MCNC: 0.77 MG/DL (ref 0.55–1.02)
CREAT UR-MCNC: 112.1 MG/DL (ref 45–106)
CREAT/UREA NIT SERPL: 16
EST. AVERAGE GLUCOSE BLD GHB EST-MCNC: 119.8 MG/DL
GFR SERPLBLD CREATININE-BSD FMLA CKD-EPI: >60 ML/MIN/1.73/M2
GLOBULIN SER-MCNC: 2.9 GM/DL (ref 2.4–3.5)
GLUCOSE SERPL-MCNC: 112 MG/DL (ref 82–115)
HBA1C MFR BLD: 5.8 %
MICROALBUMIN UR-MCNC: 12.3 UG/ML
MICROALBUMIN/CREAT RATIO PNL UR: 11 MG/GM CR (ref 0–30)
POTASSIUM SERPL-SCNC: 4.1 MMOL/L (ref 3.5–5.1)
PROT SERPL-MCNC: 6.9 GM/DL (ref 5.8–7.6)
PTH-INTACT SERPL-MCNC: 68.2 PG/ML (ref 8.7–77)
SODIUM SERPL-SCNC: 142 MMOL/L (ref 136–145)

## 2024-06-19 PROCEDURE — 80053 COMPREHEN METABOLIC PANEL: CPT

## 2024-06-19 PROCEDURE — 36415 COLL VENOUS BLD VENIPUNCTURE: CPT

## 2024-06-19 PROCEDURE — 82570 ASSAY OF URINE CREATININE: CPT

## 2024-06-19 PROCEDURE — 83036 HEMOGLOBIN GLYCOSYLATED A1C: CPT

## 2024-06-19 PROCEDURE — 83970 ASSAY OF PARATHORMONE: CPT

## 2024-06-25 ENCOUNTER — OFFICE VISIT (OUTPATIENT)
Dept: FAMILY MEDICINE | Facility: CLINIC | Age: 69
End: 2024-06-25
Payer: COMMERCIAL

## 2024-06-25 VITALS
RESPIRATION RATE: 18 BRPM | OXYGEN SATURATION: 97 % | HEART RATE: 92 BPM | DIASTOLIC BLOOD PRESSURE: 74 MMHG | SYSTOLIC BLOOD PRESSURE: 125 MMHG | HEIGHT: 66 IN | BODY MASS INDEX: 24.53 KG/M2 | WEIGHT: 152.63 LBS

## 2024-06-25 DIAGNOSIS — E11.69 HYPERLIPIDEMIA ASSOCIATED WITH TYPE 2 DIABETES MELLITUS: ICD-10-CM

## 2024-06-25 DIAGNOSIS — E11.59 HYPERTENSION ASSOCIATED WITH DIABETES: ICD-10-CM

## 2024-06-25 DIAGNOSIS — I15.2 HYPERTENSION ASSOCIATED WITH DIABETES: ICD-10-CM

## 2024-06-25 DIAGNOSIS — E11.9 TYPE 2 DIABETES MELLITUS WITHOUT COMPLICATION, WITHOUT LONG-TERM CURRENT USE OF INSULIN: Primary | ICD-10-CM

## 2024-06-25 DIAGNOSIS — E78.5 HYPERLIPIDEMIA ASSOCIATED WITH TYPE 2 DIABETES MELLITUS: ICD-10-CM

## 2024-06-25 PROCEDURE — 1160F RVW MEDS BY RX/DR IN RCRD: CPT | Mod: CPTII,,, | Performed by: FAMILY MEDICINE

## 2024-06-25 PROCEDURE — 1126F AMNT PAIN NOTED NONE PRSNT: CPT | Mod: CPTII,,, | Performed by: FAMILY MEDICINE

## 2024-06-25 PROCEDURE — 1101F PT FALLS ASSESS-DOCD LE1/YR: CPT | Mod: CPTII,,, | Performed by: FAMILY MEDICINE

## 2024-06-25 PROCEDURE — 3074F SYST BP LT 130 MM HG: CPT | Mod: CPTII,,, | Performed by: FAMILY MEDICINE

## 2024-06-25 PROCEDURE — 3288F FALL RISK ASSESSMENT DOCD: CPT | Mod: CPTII,,, | Performed by: FAMILY MEDICINE

## 2024-06-25 PROCEDURE — 3066F NEPHROPATHY DOC TX: CPT | Mod: CPTII,,, | Performed by: FAMILY MEDICINE

## 2024-06-25 PROCEDURE — 1159F MED LIST DOCD IN RCRD: CPT | Mod: CPTII,,, | Performed by: FAMILY MEDICINE

## 2024-06-25 PROCEDURE — 3061F NEG MICROALBUMINURIA REV: CPT | Mod: CPTII,,, | Performed by: FAMILY MEDICINE

## 2024-06-25 PROCEDURE — 3044F HG A1C LEVEL LT 7.0%: CPT | Mod: CPTII,,, | Performed by: FAMILY MEDICINE

## 2024-06-25 PROCEDURE — 99214 OFFICE O/P EST MOD 30 MIN: CPT | Mod: ,,, | Performed by: FAMILY MEDICINE

## 2024-06-25 PROCEDURE — 3008F BODY MASS INDEX DOCD: CPT | Mod: CPTII,,, | Performed by: FAMILY MEDICINE

## 2024-06-25 PROCEDURE — 3078F DIAST BP <80 MM HG: CPT | Mod: CPTII,,, | Performed by: FAMILY MEDICINE

## 2024-06-25 RX ORDER — BLOOD-GLUCOSE,RECEIVER,CONT
1 EACH MISCELLANEOUS DAILY
Qty: 1 EACH | Refills: 0 | Status: SHIPPED | OUTPATIENT
Start: 2024-06-25 | End: 2025-06-25

## 2024-06-25 RX ORDER — BLOOD-GLUCOSE SENSOR
1 EACH MISCELLANEOUS
Qty: 3 EACH | Refills: 3 | Status: SHIPPED | OUTPATIENT
Start: 2024-06-25 | End: 2025-06-25

## 2024-06-25 NOTE — PROGRESS NOTES
Jacklyn NANCY Bjial  06/25/2024  [id number]    Daniela Ward MD  Patient Care Team:  Daniela Ward MD as PCP - General (Family Medicine)  Leander Norman MD as Consulting Physician (Cardiology)  Carolina Wilcox MD (Inactive) as Consulting Physician (Hematology and Oncology)  Ernie Kitchen MD as Consulting Physician (Endocrinology)  Jena Larsen MD as Consulting Physician (Cardiothoracic Surgery)  Boo Friend MD as Consulting Physician (Gastroenterology)  Usha Felder MD as Consulting Physician (Medical Oncology)  Harry Ewing MD as Consulting Physician (Neurology)  Valorie Iqbal MD (Otolaryngology)  Abdoulaye Aviles MD (Ophthalmology)      Chief Complaint:  Chief Complaint   Patient presents with    Follow-up     3 month f/u labs and Diabetes       History of Present Illness:    69 y.o. female who presents today for 3 mts diabetes with labs. Labs reviewed by me and were discussed with patient. All questions regarding lab results were answered.       Review of Systems  General: denies f/c, weight loss, night sweats, decreased appetite  Eye: denies blurred vision, changes in vision  Respiratory: denies sob, wheezing, cough  Cardiovascular: denies chest pain, palpitations, edema  Gastrointestinal: denies abdominal pain, n/v, constipation, diarrhea  Integumentary: denies rashes, pruritis    Past Medical History  Past Medical History:   Diagnosis Date    Acute anal fissure     Adenocarcinoma of left lung     Anxiety disorder, unspecified     Mukherjee's esophagus     Bilateral stenosis of carotid arteries greater than 50%     Celiac disease     Chronic cerebral ischemia     Diabetes mellitus, type 2     Esophageal spasm     Family history of breast cancer in mother     GERD (gastroesophageal reflux disease)     Heart attack     Heart disease     History of cervical cancer     History of lobectomy of lung     History of TIA (transient ischemic attack)     Hyperlipidemia      Hypertension     Hypothyroidism, postsurgical     Irregular heart beat     Long-term use of high-risk medication     Lumbar radiculopathy     Lumbar spinal stenosis     Lung mass     Major depression in complete remission     Migraine with aura     Osteopenia     Osteopenia after menopause     Painful defecation     Panic disorder (episodic paroxysmal anxiety)     Personal history of colonic polyps 02/10/2022    Rectal bleeding     Restless leg syndrome     Seizures     Spinal stenosis of lumbar region with radiculopathy     Statin-induced myositis     Stroke     Thyroid disease     Vitamin D deficiency        Medications  Medication List with Changes/Refills   New Medications    BLOOD-GLUCOSE METER,CONTINUOUS (DEXCOM ) MISC    1 kit by Misc.(Non-Drug; Combo Route) route once daily.    BLOOD-GLUCOSE SENSOR (DEXCOM G7 SENSOR) GERALD    1 Device by Misc.(Non-Drug; Combo Route) route every 7 days.   Current Medications    ALPRAZOLAM (XANAX) 0.5 MG TABLET    Take 1 tablet (0.5 mg total) by mouth nightly.    ASPIRIN (ECOTRIN) 81 MG EC TABLET    Take 81 mg by mouth once daily.    CALCIUM/MAGNESIUM (CALCIUM AND MAGNESIUM ORAL)        DILTIAZEM (CARDIZEM CD) 120 MG CP24    Take 120 mg by mouth once daily.    ERGOCALCIFEROL (ERGOCALCIFEROL) 50,000 UNIT CAP    Take 50,000 Units by mouth every 7 days.    FAMOTIDINE (PEPCID) 40 MG TABLET    Take 1 tablet by mouth every evening.    FEXOFENADINE (ALLEGRA) 180 MG TABLET    Take 180 mg by mouth once daily.    HYOSCYAMINE (LEVSIN/SL) 0.125 MG SUBL    Place 1 tablet (0.125 mg total) under the tongue every 4 (four) hours as needed.    LEVOTHYROXINE (SYNTHROID) 100 MCG TABLET    Take 1 tablet (100 mcg total) by mouth before breakfast.    METFORMIN (GLUCOPHAGE) 1000 MG TABLET    Take 1 tablet (1,000 mg total) by mouth 2 (two) times daily with meals.    METOPROLOL SUCCINATE (TOPROL-XL) 25 MG 24 HR TABLET    Take 12.5 mg by mouth nightly.    ONDANSETRON (ZOFRAN) 8 MG TABLET    Take  "1 tablet (8 mg total) by mouth every 8 (eight) hours as needed for Nausea.    PANTOPRAZOLE (PROTONIX) 40 MG TABLET    Take 40 mg by mouth once daily.    PRAVASTATIN (PRAVACHOL) 10 MG TABLET    TAKE 1 TABLET BY MOUTH EVERY DAY    VENLAFAXINE (EFFEXOR-XR) 75 MG 24 HR CAPSULE    TAKE 1 CAPSULE BY MOUTH EVERY DAY       Past Surgical History:   Procedure Laterality Date    APPENDECTOMY  1992    CERVICAL BIOPSY  1979    CHOLECYSTECTOMY  1992    COLONOSCOPY  10/15/2018    Dr Leander Rizo    COLONOSCOPY W/ BIOPSIES AND POLYPECTOMY  02/10/2022    Dr Boo Friend    EGD, WITH BALLOON DILATION  12/21/2020    Dr Leander Rizo    ESOPHAGOGASTRODUODENOSCOPY  05/18/2016    Dr Leander Rizo    THORACOSCOPY  03/19/2021    Dr Frank Ortiz    TONSILLECTOMY, ADENOIDECTOMY  1968    TOTAL ABDOMINAL HYSTERECTOMY  1988    TOTAL THYROIDECTOMY  05/2000    TOTAL THYROIDECTOMY  04/2000       SUBJECTIVE:  Health Maintenance  Health Maintenance Topics with due status: Not Due       Topic Last Completion Date    TETANUS VACCINE 05/18/2020    Colorectal Cancer Screening 02/10/2022    DEXA Scan 11/10/2023    Lipid Panel 03/13/2024    Mammogram 05/01/2024    Hemoglobin A1c (Prediabetes) 06/19/2024     Health Maintenance Due   Topic Date Due    Shingles Vaccine (1 of 2) Never done    High Dose Statin  Never done    RSV Vaccine (Age 60+ and Pregnant patients) (1 - 1-dose 60+ series) Never done    COVID-19 Vaccine (8 - 2023-24 season) 09/01/2023       Exam:  Vitals:    06/25/24 1306   BP: 125/74   BP Location: Right arm   Patient Position: Sitting   BP Method: Small (Automatic)   Pulse: 92   Resp: 18   SpO2: 97%   Weight: 69.2 kg (152 lb 9.6 oz)   Height: 5' 6" (1.676 m)     Weight: 69.2 kg (152 lb 9.6 oz)   Body mass index is 24.63 kg/m².      Physical Exam  Constitutional: NAD, alert, pleasant  Respiratory: CTAB, no wheezes, rales or rhonchi. No accessory muscle use  Eyes: EOMI  Cardiovascular: RRR, No m/r/g. No JVD. No LE " edema  Gastrointestinal: BS+, nontender, nondistended  Integumentary: warm, dry, intact  Psych: AA&Ox3      ICD-10-CM ICD-9-CM   1. Type 2 diabetes mellitus without complication, without long-term current use of insulin  E11.9 250.00   2. Hypertension associated with diabetes  E11.59 250.80    I15.2 401.9   3. Hyperlipidemia associated with type 2 diabetes mellitus  E11.69 250.80    E78.5 272.4       1. Type 2 diabetes mellitus without complication, without long-term current use of insulin  Overview:  A1c down from 6.0 to 5.8. On metformin 500 mg bid. Has lost 7 lbs. Fasting sugars 150's and up.    Adhere to a low carb/sugar diet that is also low in fat, but high in protein. Eat foods such as baked chicken, turkey,     Continue current Rx meds  Rtc 6 mts with labs    Orders:  -     Microalbumin/Creatinine Ratio, Urine; Future; Expected date: 12/25/2024  -     blood-glucose meter,continuous (DEXCOM ) Misc; 1 kit by Misc.(Non-Drug; Combo Route) route once daily.  Dispense: 1 each; Refill: 0  -     blood-glucose sensor (DEXCOM G7 SENSOR) Kaylee; 1 Device by Misc.(Non-Drug; Combo Route) route every 7 days.  Dispense: 3 each; Refill: 3    2. Hypertension associated with diabetes  Overview:  At goal on current meds. Asymptomatic    Continue current Rx meds        3. Hyperlipidemia associated with type 2 diabetes mellitus  Overview:  On pravastatin, doing well. Ldl at goal.     Continue current Rx meds             Follow up: No follow-ups on file.      Care Plan/Goals: Reviewed   Goals    None

## 2024-07-15 ENCOUNTER — DOCUMENTATION ONLY (OUTPATIENT)
Dept: FAMILY MEDICINE | Facility: CLINIC | Age: 69
End: 2024-07-15
Payer: COMMERCIAL

## 2024-08-14 ENCOUNTER — INFUSION (OUTPATIENT)
Dept: INFUSION THERAPY | Facility: HOSPITAL | Age: 69
End: 2024-08-14
Attending: FAMILY MEDICINE
Payer: COMMERCIAL

## 2024-08-14 VITALS
DIASTOLIC BLOOD PRESSURE: 73 MMHG | RESPIRATION RATE: 18 BRPM | OXYGEN SATURATION: 98 % | SYSTOLIC BLOOD PRESSURE: 112 MMHG | TEMPERATURE: 98 F | HEART RATE: 74 BPM

## 2024-08-14 DIAGNOSIS — M81.0 AGE-RELATED OSTEOPOROSIS WITHOUT CURRENT PATHOLOGICAL FRACTURE: Primary | ICD-10-CM

## 2024-08-14 PROCEDURE — 63600175 PHARM REV CODE 636 W HCPCS: Mod: JZ,TB | Performed by: FAMILY MEDICINE

## 2024-08-14 PROCEDURE — 96372 THER/PROPH/DIAG INJ SC/IM: CPT

## 2024-08-14 RX ADMIN — DENOSUMAB 60 MG: 60 INJECTION SUBCUTANEOUS at 09:08

## 2024-08-14 NOTE — PROGRESS NOTES
10:07 Patient denies symptoms of adverse reaction to injection. Provided patient with AVS.  Discharge instructions explained, questions answered.  Patient verbalized understanding and discharged in stable condition.

## 2024-08-14 NOTE — PROGRESS NOTES
09:40 Provided patient with a copy of education regarding Prolia.  Nurse discussed medication, including the risks of hypocalcemia, osteonecrosis of the jaw, atypical femoral fractures serious infections, and dermatologic reactions associated with Prolia.  Also, explained that the patient's provider has determined that the benefits of Prolia outweigh the risks of her receiving it. Patient verbalized understanding.

## 2024-09-13 ENCOUNTER — TELEPHONE (OUTPATIENT)
Dept: FAMILY MEDICINE | Facility: CLINIC | Age: 69
End: 2024-09-13
Payer: COMMERCIAL

## 2024-09-16 ENCOUNTER — TELEPHONE (OUTPATIENT)
Dept: FAMILY MEDICINE | Facility: CLINIC | Age: 69
End: 2024-09-16
Payer: COMMERCIAL

## 2024-09-17 ENCOUNTER — TELEPHONE (OUTPATIENT)
Dept: FAMILY MEDICINE | Facility: CLINIC | Age: 69
End: 2024-09-17
Payer: COMMERCIAL

## 2024-09-25 ENCOUNTER — TELEPHONE (OUTPATIENT)
Dept: FAMILY MEDICINE | Facility: CLINIC | Age: 69
End: 2024-09-25
Payer: COMMERCIAL

## 2024-10-09 ENCOUNTER — OFFICE VISIT (OUTPATIENT)
Dept: FAMILY MEDICINE | Facility: CLINIC | Age: 69
End: 2024-10-09
Payer: COMMERCIAL

## 2024-10-09 ENCOUNTER — TELEPHONE (OUTPATIENT)
Dept: FAMILY MEDICINE | Facility: CLINIC | Age: 69
End: 2024-10-09

## 2024-10-09 ENCOUNTER — LAB VISIT (OUTPATIENT)
Dept: LAB | Facility: HOSPITAL | Age: 69
End: 2024-10-09
Attending: FAMILY MEDICINE
Payer: COMMERCIAL

## 2024-10-09 VITALS
BODY MASS INDEX: 23.03 KG/M2 | WEIGHT: 143.31 LBS | DIASTOLIC BLOOD PRESSURE: 78 MMHG | TEMPERATURE: 98 F | HEIGHT: 66 IN | RESPIRATION RATE: 18 BRPM | OXYGEN SATURATION: 99 % | SYSTOLIC BLOOD PRESSURE: 118 MMHG | HEART RATE: 86 BPM

## 2024-10-09 DIAGNOSIS — R19.7 ACUTE DIARRHEA: ICD-10-CM

## 2024-10-09 DIAGNOSIS — R19.7 ACUTE DIARRHEA: Primary | ICD-10-CM

## 2024-10-09 DIAGNOSIS — C34.90 MALIGNANT NEOPLASM OF LUNG, UNSPECIFIED LATERALITY, UNSPECIFIED PART OF LUNG: ICD-10-CM

## 2024-10-09 LAB
C DIFF TOX A+B STL QL IA: NEGATIVE
CLOSTRIDIUM DIFFICILE GDH ANTIGEN (OHS): NEGATIVE

## 2024-10-09 PROCEDURE — 1101F PT FALLS ASSESS-DOCD LE1/YR: CPT | Mod: CPTII,,, | Performed by: FAMILY MEDICINE

## 2024-10-09 PROCEDURE — 1159F MED LIST DOCD IN RCRD: CPT | Mod: CPTII,,, | Performed by: FAMILY MEDICINE

## 2024-10-09 PROCEDURE — 3288F FALL RISK ASSESSMENT DOCD: CPT | Mod: CPTII,,, | Performed by: FAMILY MEDICINE

## 2024-10-09 PROCEDURE — 3074F SYST BP LT 130 MM HG: CPT | Mod: CPTII,,, | Performed by: FAMILY MEDICINE

## 2024-10-09 PROCEDURE — 2023F DILAT RTA XM W/O RTNOPTHY: CPT | Mod: CPTII,,, | Performed by: FAMILY MEDICINE

## 2024-10-09 PROCEDURE — 3078F DIAST BP <80 MM HG: CPT | Mod: CPTII,,, | Performed by: FAMILY MEDICINE

## 2024-10-09 PROCEDURE — 3066F NEPHROPATHY DOC TX: CPT | Mod: CPTII,,, | Performed by: FAMILY MEDICINE

## 2024-10-09 PROCEDURE — 1160F RVW MEDS BY RX/DR IN RCRD: CPT | Mod: CPTII,,, | Performed by: FAMILY MEDICINE

## 2024-10-09 PROCEDURE — 3044F HG A1C LEVEL LT 7.0%: CPT | Mod: CPTII,,, | Performed by: FAMILY MEDICINE

## 2024-10-09 PROCEDURE — 87507 IADNA-DNA/RNA PROBE TQ 12-25: CPT | Performed by: FAMILY MEDICINE

## 2024-10-09 PROCEDURE — 3061F NEG MICROALBUMINURIA REV: CPT | Mod: CPTII,,, | Performed by: FAMILY MEDICINE

## 2024-10-09 PROCEDURE — 86318 IA INFECTIOUS AGENT ANTIBODY: CPT

## 2024-10-09 PROCEDURE — 99214 OFFICE O/P EST MOD 30 MIN: CPT | Mod: ,,, | Performed by: FAMILY MEDICINE

## 2024-10-09 PROCEDURE — 3008F BODY MASS INDEX DOCD: CPT | Mod: CPTII,,, | Performed by: FAMILY MEDICINE

## 2024-10-09 PROCEDURE — 1125F AMNT PAIN NOTED PAIN PRSNT: CPT | Mod: CPTII,,, | Performed by: FAMILY MEDICINE

## 2024-10-09 RX ORDER — ONDANSETRON HYDROCHLORIDE 8 MG/1
8 TABLET, FILM COATED ORAL EVERY 8 HOURS PRN
Qty: 30 TABLET | Refills: 1 | Status: SHIPPED | OUTPATIENT
Start: 2024-10-09

## 2024-10-09 NOTE — PROGRESS NOTES
Jacklyn Appiah  10/09/2024  [id number]    Daniela Ward MD  Patient Care Team:  Daniela Ward MD as PCP - General (Family Medicine)  Leander Norman MD as Consulting Physician (Cardiology)  Carolina Wilcox MD (Inactive) as Consulting Physician (Hematology and Oncology)  Ernie Kitchen MD as Consulting Physician (Endocrinology)  Jena Larsen MD as Consulting Physician (Cardiothoracic Surgery)  Boo Friend MD as Consulting Physician (Gastroenterology)  Usha Felder MD as Consulting Physician (Medical Oncology)  Harry Ewing MD as Consulting Physician (Neurology)  Valorie Iqbal MD (Otolaryngology)  Abdoulaye Aviles MD (Ophthalmology)      Chief Complaint:  Chief Complaint   Patient presents with    Follow-up     Follow up from Urgent Care-food poisoning, still having diarrhea       History of Present Illness:    69 y.o. female who presents today c/o ongoing food poisoning x 5 days. She ate a small pork loin, rice dressing. Within 2 hours she started having nausea, vomiting and diarrhea. Went to urgent care the following day. Vomiting has resolved but continues to have diarrhea. Every time she eats she is having diarrhea. Also c/o abdominal cramping. Denies fever but is having chills. Has been drinking water and gatorade.     Labs reviewed from [facility] on yesterday and they are normal.     Review of Systems  General: denies f/c, weight loss, night sweats, decreased appetite  Eye: denies blurred vision, changes in vision  Respiratory: denies sob, wheezing, cough  Cardiovascular: denies chest pain, palpitations, edema  Integumentary: denies rashes, pruritis    Past Medical History  Past Medical History:   Diagnosis Date    Acute anal fissure     Adenocarcinoma of left lung     Anxiety disorder, unspecified     Mukherjee's esophagus     Bilateral stenosis of carotid arteries greater than 50%     Celiac disease     Chronic cerebral ischemia     Diabetes mellitus, type 2      Esophageal spasm     Family history of breast cancer in mother     GERD (gastroesophageal reflux disease)     Heart attack     Heart disease     History of cervical cancer     History of lobectomy of lung     History of TIA (transient ischemic attack)     Hyperlipidemia     Hypertension     Hypothyroidism, postsurgical     Irregular heart beat     Long-term use of high-risk medication     Lumbar radiculopathy     Lumbar spinal stenosis     Lung mass     Major depression in complete remission     Migraine with aura     Osteopenia     Osteopenia after menopause     Painful defecation     Panic disorder (episodic paroxysmal anxiety)     Personal history of colonic polyps 02/10/2022    Rectal bleeding     Restless leg syndrome     Seizures     Spinal stenosis of lumbar region with radiculopathy     Statin-induced myositis     Stroke     Thyroid disease     Vitamin D deficiency        Medications  Medication List with Changes/Refills   Current Medications    ALPRAZOLAM (XANAX) 0.5 MG TABLET    Take 1 tablet (0.5 mg total) by mouth nightly.    ASPIRIN (ECOTRIN) 81 MG EC TABLET    Take 81 mg by mouth once daily.    CALCIUM/MAGNESIUM (CALCIUM AND MAGNESIUM ORAL)        DILTIAZEM (CARDIZEM CD) 120 MG CP24    Take 120 mg by mouth once daily.    ERGOCALCIFEROL (ERGOCALCIFEROL) 50,000 UNIT CAP    Take 50,000 Units by mouth every 7 days.    FAMOTIDINE (PEPCID) 40 MG TABLET    Take 1 tablet by mouth every evening.    FEXOFENADINE (ALLEGRA) 180 MG TABLET    Take 180 mg by mouth once daily.    HYOSCYAMINE (LEVSIN/SL) 0.125 MG SUBL    Place 1 tablet (0.125 mg total) under the tongue every 4 (four) hours as needed.    LEVOTHYROXINE (SYNTHROID) 100 MCG TABLET    Take 1 tablet (100 mcg total) by mouth before breakfast.    METFORMIN (GLUCOPHAGE) 1000 MG TABLET    Take 1 tablet (1,000 mg total) by mouth 2 (two) times daily with meals.    METOPROLOL SUCCINATE (TOPROL-XL) 25 MG 24 HR TABLET    Take 12.5 mg by mouth nightly.     ONDANSETRON (ZOFRAN) 8 MG TABLET    Take 1 tablet (8 mg total) by mouth every 8 (eight) hours as needed for Nausea.    PANTOPRAZOLE (PROTONIX) 40 MG TABLET    Take 40 mg by mouth once daily.    PRAVASTATIN (PRAVACHOL) 10 MG TABLET    TAKE 1 TABLET BY MOUTH EVERY DAY    VENLAFAXINE (EFFEXOR-XR) 75 MG 24 HR CAPSULE    TAKE 1 CAPSULE BY MOUTH EVERY DAY   Discontinued Medications    BLOOD-GLUCOSE METER,CONTINUOUS (DEXCOM ) MISC    1 kit by Misc.(Non-Drug; Combo Route) route once daily.    BLOOD-GLUCOSE SENSOR (DEXCOM G7 SENSOR) GERALD    1 Device by Misc.(Non-Drug; Combo Route) route every 7 days.       Past Surgical History:   Procedure Laterality Date    APPENDECTOMY  1992    CERVICAL BIOPSY  1979    CHOLECYSTECTOMY  1992    COLONOSCOPY  10/15/2018    Dr Leander Rizo    COLONOSCOPY W/ BIOPSIES AND POLYPECTOMY  02/10/2022    Dr Boo Friend    EGD, WITH BALLOON DILATION  12/21/2020    Dr Leander Rizo    ESOPHAGOGASTRODUODENOSCOPY  05/18/2016    Dr Leander Rizo    THORACOSCOPY  03/19/2021    Dr Marie All    TONSILLECTOMY, ADENOIDECTOMY  1968    TOTAL ABDOMINAL HYSTERECTOMY  1988    TOTAL THYROIDECTOMY  05/2000    TOTAL THYROIDECTOMY  04/2000       SUBJECTIVE:  Health Maintenance  Health Maintenance Topics with due status: Not Due       Topic Last Completion Date    TETANUS VACCINE 05/18/2020    Colorectal Cancer Screening 02/10/2022    DEXA Scan 11/10/2023    Lipid Panel 03/13/2024    Eye Exam 03/19/2024    Mammogram 05/01/2024    Diabetes Urine Screening 06/19/2024    Hemoglobin A1c 06/19/2024     Health Maintenance Due   Topic Date Due    Foot Exam  Never done    Shingles Vaccine (1 of 2) Never done    High Dose Statin  Never done    RSV Vaccine (Age 60+ and Pregnant patients) (1 - Risk 60-74 years 1-dose series) Never done    Influenza Vaccine (1) 09/01/2024    COVID-19 Vaccine (8 - 2024-25 season) 09/01/2024       Exam:  Vitals:    10/09/24 0938   BP: 118/78   BP Location: Left arm   Patient  "Position: Sitting   Pulse: 86   Resp: 18   Temp: 98.2 °F (36.8 °C)   TempSrc: Oral   SpO2: 99%   Weight: 65 kg (143 lb 4.8 oz)   Height: 5' 6" (1.676 m)     Weight: 65 kg (143 lb 4.8 oz)   Body mass index is 23.13 kg/m².      Physical Exam  Constitutional: NAD, alert, pleasant  Respiratory: CTAB, no wheezes, rales or rhonchi. No accessory muscle use  Eyes: EOMI  Cardiovascular: RRR, No m/r/g. No JVD. No LE edema  Gastrointestinal: BS hyperactive, diffuse lower abdominal tenderness without rebound or guarding, nondistended  Integumentary: warm, dry, intact  Psych: AA&Ox3      ICD-10-CM ICD-9-CM   1. Acute diarrhea  R19.7 787.91       1. Acute diarrhea  -     Clostridium Diff Toxin, A & B, EIA; Future; Expected date: 10/09/2024  -     GI Panel; Future; Expected date: 10/09/2024     Drink plenty of fluids such as gatorade, powerade  Take over the counter imodium for diarrhea as directed on box. (Not recommended in children, check age on box)  May also use pepto bismol  Take meds as prescribed  No solids for 12 hours. Only fluids, broths or jello. After 12 hours, you can advance to solids such as soups, crackers, toast. After a full 24 hours of stomach rest, you may start to advance to a regular diet  Go to ED if you have severe abdominal pain, constant vomiting/diarrhea despite medications, dizziness, lightheadedness or fainting  Check gi pcr and c. Diff      Follow up: No follow-ups on file.      Care Plan/Goals: Reviewed   Goals    None               "

## 2024-10-10 LAB

## 2024-10-14 ENCOUNTER — HOSPITAL ENCOUNTER (EMERGENCY)
Facility: HOSPITAL | Age: 69
Discharge: HOME | End: 2024-10-14
Attending: GENERAL PRACTICE
Payer: COMMERCIAL

## 2024-10-14 VITALS
SYSTOLIC BLOOD PRESSURE: 126 MMHG | RESPIRATION RATE: 18 BRPM | TEMPERATURE: 98 F | HEIGHT: 66 IN | WEIGHT: 142.19 LBS | DIASTOLIC BLOOD PRESSURE: 73 MMHG | OXYGEN SATURATION: 98 % | BODY MASS INDEX: 22.85 KG/M2 | HEART RATE: 74 BPM

## 2024-10-14 DIAGNOSIS — K90.9 DIARRHEA DUE TO MALABSORPTION: Primary | ICD-10-CM

## 2024-10-14 DIAGNOSIS — R19.7 DIARRHEA DUE TO MALABSORPTION: Primary | ICD-10-CM

## 2024-10-14 LAB
ALBUMIN SERPL-MCNC: 4 G/DL (ref 3.4–4.8)
ALBUMIN/GLOB SERPL: 1.2 RATIO (ref 1.1–2)
ALP SERPL-CCNC: 95 UNIT/L (ref 40–150)
ALT SERPL-CCNC: 43 UNIT/L (ref 0–55)
ANION GAP SERPL CALC-SCNC: 12 MEQ/L
AST SERPL-CCNC: 24 UNIT/L (ref 5–34)
BACTERIA #/AREA URNS AUTO: NORMAL /HPF
BASOPHILS # BLD AUTO: 0.05 X10(3)/MCL
BASOPHILS NFR BLD AUTO: 0.4 %
BILIRUB SERPL-MCNC: 0.4 MG/DL
BILIRUB UR QL STRIP.AUTO: NEGATIVE
BUN SERPL-MCNC: 8 MG/DL (ref 9.8–20.1)
CALCIUM SERPL-MCNC: 8.6 MG/DL (ref 8.4–10.2)
CHLORIDE SERPL-SCNC: 106 MMOL/L (ref 98–107)
CLARITY UR: CLEAR
CO2 SERPL-SCNC: 22 MMOL/L (ref 23–31)
COLOR UR AUTO: YELLOW
CREAT SERPL-MCNC: 0.8 MG/DL (ref 0.55–1.02)
CREAT/UREA NIT SERPL: 10
EOSINOPHIL # BLD AUTO: 0.29 X10(3)/MCL (ref 0–0.9)
EOSINOPHIL NFR BLD AUTO: 2.3 %
ERYTHROCYTE [DISTWIDTH] IN BLOOD BY AUTOMATED COUNT: 18.1 % (ref 11.5–17)
GFR SERPLBLD CREATININE-BSD FMLA CKD-EPI: >60 ML/MIN/1.73/M2
GLOBULIN SER-MCNC: 3.3 GM/DL (ref 2.4–3.5)
GLUCOSE SERPL-MCNC: 111 MG/DL (ref 82–115)
GLUCOSE UR QL STRIP: NEGATIVE
HCT VFR BLD AUTO: 40.5 % (ref 37–47)
HGB BLD-MCNC: 12.8 G/DL (ref 12–16)
HGB UR QL STRIP: ABNORMAL
IMM GRANULOCYTES # BLD AUTO: 0.07 X10(3)/MCL (ref 0–0.04)
IMM GRANULOCYTES NFR BLD AUTO: 0.6 %
INFLUENZA A (OHS): NEGATIVE
INFLUENZA B (OHS): NEGATIVE
KETONES UR QL STRIP: NEGATIVE
LEUKOCYTE ESTERASE UR QL STRIP: NEGATIVE
LYMPHOCYTES # BLD AUTO: 2.28 X10(3)/MCL (ref 0.6–4.6)
LYMPHOCYTES NFR BLD AUTO: 18.3 %
MCH RBC QN AUTO: 22.6 PG (ref 27–31)
MCHC RBC AUTO-ENTMCNC: 31.6 G/DL (ref 33–36)
MCV RBC AUTO: 71.6 FL (ref 80–94)
MONOCYTES # BLD AUTO: 0.85 X10(3)/MCL (ref 0.1–1.3)
MONOCYTES NFR BLD AUTO: 6.8 %
NEUTROPHILS # BLD AUTO: 8.91 X10(3)/MCL (ref 2.1–9.2)
NEUTROPHILS NFR BLD AUTO: 71.6 %
NITRITE UR QL STRIP: NEGATIVE
NRBC BLD AUTO-RTO: 0 %
PH UR STRIP: 6 [PH]
PLATELET # BLD AUTO: 318 X10(3)/MCL (ref 130–400)
PMV BLD AUTO: 10.1 FL (ref 7.4–10.4)
POTASSIUM SERPL-SCNC: 3.8 MMOL/L (ref 3.5–5.1)
PROT SERPL-MCNC: 7.3 GM/DL (ref 5.8–7.6)
PROT UR QL STRIP: NEGATIVE
RBC # BLD AUTO: 5.66 X10(6)/MCL (ref 4.2–5.4)
RBC #/AREA URNS AUTO: NORMAL /HPF
SARS-COV-2 RDRP RESP QL NAA+PROBE: NEGATIVE
SODIUM SERPL-SCNC: 140 MMOL/L (ref 136–145)
SP GR UR STRIP.AUTO: <=1.005 (ref 1–1.03)
SQUAMOUS #/AREA URNS AUTO: NORMAL /HPF
UROBILINOGEN UR STRIP-ACNC: 0.2
WBC # BLD AUTO: 12.45 X10(3)/MCL (ref 4.5–11.5)
WBC #/AREA URNS AUTO: NORMAL /HPF

## 2024-10-14 PROCEDURE — 87502 INFLUENZA DNA AMP PROBE: CPT | Performed by: GENERAL PRACTICE

## 2024-10-14 PROCEDURE — 80053 COMPREHEN METABOLIC PANEL: CPT | Performed by: GENERAL PRACTICE

## 2024-10-14 PROCEDURE — 85025 COMPLETE CBC W/AUTO DIFF WBC: CPT | Performed by: GENERAL PRACTICE

## 2024-10-14 PROCEDURE — U0002 COVID-19 LAB TEST NON-CDC: HCPCS | Performed by: GENERAL PRACTICE

## 2024-10-14 PROCEDURE — 25000003 PHARM REV CODE 250: Performed by: GENERAL PRACTICE

## 2024-10-14 PROCEDURE — 99283 EMERGENCY DEPT VISIT LOW MDM: CPT | Mod: 25

## 2024-10-14 PROCEDURE — 81001 URINALYSIS AUTO W/SCOPE: CPT | Performed by: GENERAL PRACTICE

## 2024-10-14 RX ORDER — DIPHENOXYLATE HYDROCHLORIDE AND ATROPINE SULFATE 2.5; .025 MG/1; MG/1
1 TABLET ORAL 4 TIMES DAILY PRN
Qty: 20 TABLET | Refills: 0 | Status: SHIPPED | OUTPATIENT
Start: 2024-10-14 | End: 2024-10-19

## 2024-10-14 RX ORDER — DIPHENOXYLATE HYDROCHLORIDE AND ATROPINE SULFATE 2.5; .025 MG/1; MG/1
1 TABLET ORAL
Status: COMPLETED | OUTPATIENT
Start: 2024-10-14 | End: 2024-10-14

## 2024-10-14 RX ADMIN — DIPHENOXYLATE HYDROCHLORIDE AND ATROPINE SULFATE 1 TABLET: 2.5; .025 TABLET ORAL at 04:10

## 2024-10-14 NOTE — ED NOTES
69 year old female ambulatory to ED complaints of Nausea, vomiting and diarrhea for approx. One week.  Pt. States she has a stool specimen processing in the lab

## 2024-10-14 NOTE — ED PROVIDER NOTES
Encounter Date: 10/14/2024       History     Chief Complaint   Patient presents with    Diarrhea     Diarrhea 1 week , states onset initially last week with n/v/d.sat and sun n/v present now only diarrhea.Here tonight due to feeling she is dehydrated lower abd pain cramping afebrile      Diarrhea 1 week , states onset initially last week with n/v/d.sat and sun n/v present now only diarrhea.Here tonight due to feeling she is dehydrated lower abd pain cramping afebrile. Stool cultures done last week were negative    The history is provided by the patient.   Diarrhea   This is a chronic problem. The current episode started several days ago. The problem occurs less than 2 times per day. The problem has been waxing and waning. Associated symptoms include abdominal pain. Nothing aggravates the symptoms. There are no known risk factors. She has tried nothing for the symptoms.     Review of patient's allergies indicates:   Allergen Reactions    Gluten Rash     Past Medical History:   Diagnosis Date    Acute anal fissure     Adenocarcinoma of left lung     Anxiety disorder, unspecified     Mukherjee's esophagus     Bilateral stenosis of carotid arteries greater than 50%     Celiac disease     Chronic cerebral ischemia     Diabetes mellitus, type 2     Esophageal spasm     Family history of breast cancer in mother     GERD (gastroesophageal reflux disease)     Heart attack     Heart disease     History of cervical cancer     History of lobectomy of lung     History of TIA (transient ischemic attack)     Hyperlipidemia     Hypertension     Hypothyroidism, postsurgical     Irregular heart beat     Long-term use of high-risk medication     Lumbar radiculopathy     Lumbar spinal stenosis     Lung mass     Major depression in complete remission     Migraine with aura     Osteopenia     Osteopenia after menopause     Painful defecation     Panic disorder (episodic paroxysmal anxiety)     Personal history of colonic polyps 02/10/2022     Rectal bleeding     Restless leg syndrome     Seizures     Spinal stenosis of lumbar region with radiculopathy     Statin-induced myositis     Stroke     Thyroid disease     Vitamin D deficiency      Past Surgical History:   Procedure Laterality Date    APPENDECTOMY  1992    CERVICAL BIOPSY  1979    CHOLECYSTECTOMY  1992    COLONOSCOPY  10/15/2018    Dr Leander Rizo    COLONOSCOPY W/ BIOPSIES AND POLYPECTOMY  02/10/2022    Dr Boo Friend    EGD, WITH BALLOON DILATION  12/21/2020    Dr Leander Rizo    ESOPHAGOGASTRODUODENOSCOPY  05/18/2016    Dr Leander Rizo    THORACOSCOPY  03/19/2021    Dr Marie Allam    TONSILLECTOMY, ADENOIDECTOMY  1968    TOTAL ABDOMINAL HYSTERECTOMY  1988    TOTAL THYROIDECTOMY  05/2000    TOTAL THYROIDECTOMY  04/2000     Family History   Problem Relation Name Age of Onset    Breast cancer Mother Flaquita Pradhan     Diabetes Mellitus Mother Flaquita Pradhan     Heart disease Mother Flaquita Pradhan     Hyperlipidemia Mother Flaquita Pradhan     Hypertension Mother Flaquita Pradhan     Kidney disease Mother Flaquita Pradhan     Stroke Mother Flaquita Pradhan     COPD Mother Flaquita Pradahn     Depression Mother Flaquita Pradhan     Diabetes Mother Flaquita Pradhan     Mental illness Mother Flaquita Pradhan     Cancer Mother Flaquita Pradhan     Alzheimer's disease Father Alvin Pradhan Sr.     Arthritis Father Alvin Pradhan Sr.     Cancer Father Alvin Pradhan Sr.     Heart attack Brother Alvin Pradhan Jr.     Heart disease Brother Alvin Pradhan Jr.     Alcohol abuse Brother Alvin Pradhan Jr.     Heart disease Maternal Grandmother Unia Kiran     Lung cancer Maternal Grandfather      Breast cancer Paternal Grandmother Felitia Marguerite     Cancer Paternal Grandmother Felitia Marguerite      Social History     Tobacco Use    Smoking status: Former     Current packs/day: 0.00     Average packs/day: 1.5 packs/day for 15.0 years (22.5 ttl pk-yrs)     Types: Cigarettes     Start date: 3/18/2006     Quit date: 3/18/2021     Years since  quitting: 3.5    Smokeless tobacco: Never   Substance Use Topics    Alcohol use: Never    Drug use: Never     Review of Systems   Constitutional:  Positive for fatigue.   HENT: Negative.     Eyes: Negative.    Respiratory: Negative.     Cardiovascular: Negative.    Gastrointestinal:  Positive for abdominal pain and diarrhea.   Endocrine: Negative.    Genitourinary: Negative.    Musculoskeletal: Negative.    Skin: Negative.    Allergic/Immunologic: Negative.    Neurological: Negative.    Hematological: Negative.    Psychiatric/Behavioral: Negative.     All other systems reviewed and are negative.      Physical Exam     Initial Vitals [10/14/24 0348]   BP Pulse Resp Temp SpO2   128/84 83 18 97.9 °F (36.6 °C) 98 %      MAP       --         Physical Exam    Nursing note and vitals reviewed.  Constitutional: She appears well-developed and well-nourished.   HENT:   Head: Normocephalic and atraumatic. Mouth/Throat: Oropharynx is clear and moist.   Eyes: EOM are normal. Pupils are equal, round, and reactive to light.   Neck: Neck supple.   Normal range of motion.  Cardiovascular:  Normal rate, regular rhythm, normal heart sounds and intact distal pulses.           Pulmonary/Chest: Breath sounds normal.   Abdominal: Abdomen is soft. Bowel sounds are normal.   Musculoskeletal:         General: Normal range of motion.      Cervical back: Normal range of motion and neck supple.     Neurological: She is alert and oriented to person, place, and time. She has normal strength. GCS score is 15. GCS eye subscore is 4. GCS verbal subscore is 5. GCS motor subscore is 6.   Skin: Skin is warm and dry.   Psychiatric: She has a normal mood and affect. Her behavior is normal. Judgment and thought content normal.         ED Course   Procedures  Labs Reviewed   COMPREHENSIVE METABOLIC PANEL - Abnormal       Result Value    Sodium 140      Potassium 3.8      Chloride 106      CO2 22 (*)     Glucose 111      Blood Urea Nitrogen 8.0 (*)      Creatinine 0.80      Calcium 8.6      Protein Total 7.3      Albumin 4.0      Globulin 3.3      Albumin/Globulin Ratio 1.2      Bilirubin Total 0.4      ALP 95      ALT 43      AST 24      eGFR >60      Anion Gap 12.0      BUN/Creatinine Ratio 10     URINALYSIS, REFLEX TO URINE CULTURE - Abnormal    Color, UA Yellow      Appearance, UA Clear      Specific Gravity, UA <=1.005      pH, UA 6.0      Protein, UA Negative      Glucose, UA Negative      Ketones, UA Negative      Blood, UA Trace (*)     Bilirubin, UA Negative      Urobilinogen, UA 0.2      Nitrites, UA Negative      Leukocyte Esterase, UA Negative     CBC WITH DIFFERENTIAL - Abnormal    WBC 12.45 (*)     RBC 5.66 (*)     Hgb 12.8      Hct 40.5      MCV 71.6 (*)     MCH 22.6 (*)     MCHC 31.6 (*)     RDW 18.1 (*)     Platelet 318      MPV 10.1      Neut % 71.6      Lymph % 18.3      Mono % 6.8      Eos % 2.3      Basophil % 0.4      Lymph # 2.28      Neut # 8.91      Mono # 0.85      Eos # 0.29      Baso # 0.05      IG# 0.07 (*)     IG% 0.6      NRBC% 0.0     RAPID INFLUENZA A/B - Normal    Influenza A Negative      Influenza B Negative     SARS-COV-2 RNA AMPLIFICATION, QUAL - Normal    SARS COV-2 Molecular Negative      Narrative:     The IDNOW COVID-19 assay is a rapid molecular in vitro diagnostic test utilizing an isothermal nucleic acid amplification technology intended for the qualitative detection of nucleic acid from the SARS-CoV-2 viral RNA in direct nasal, nasopharyngeal or throat swabs from individuals who are suspected of COVID-19 by their healthcare provider.   URINALYSIS, MICROSCOPIC - Normal    Bacteria, UA Rare      RBC, UA 0-5      WBC, UA 0-2      Squamous Epithelial Cells, UA Rare     CBC W/ AUTO DIFFERENTIAL    Narrative:     The following orders were created for panel order CBC auto differential.  Procedure                               Abnormality         Status                     ---------                               -----------          ------                     CBC with Differential[[id number]]       Abnormal            Final result                 Please view results for these tests on the individual orders.          Imaging Results              CT Abdomen Pelvis  Without Contrast (Preliminary result)  Result time 10/14/24 04:32:56      Preliminary result by Haris Lawler MD (10/14/24 04:32:56)                   Narrative:    START OF REPORT:  Technique: CT of the abdomen and pelvis was performed with axial images as well as sagittal and coronal reconstruction images without intravenous contrast.    Comparison: None available.    Clinical History: Diarrhea 1 week , states onset initially last week with n/v/d.sat and sun n/v present now only diarrhea.Here tonight due to feeling she is dehydrated lower abd pain cramping afebrile.    Dosage Information: Automated Exposure Control was utilized 278.12 mGy.cm.    Findings:  Lines and Tubes: None.  Thorax:  Lungs: There is minimal nonspecific dependent change at the lung bases. No focal infiltrate or consolidation is seen. A 3.5 cm air cyst is seen in the left lung base (image 4 series 4).  Pleura: No effusions or thickening are seen. No pneumothorax is seen in the visualized lung bases.  Heart: The heart size is within normal limits.  Abdomen:  Abdominal Wall: There is a small uncomplicated umbilical hernia which contains mesenteric fat. The abdominal wall otherwise appears unremarkable.  Liver: A few cysts measuring up to 1.8 cm (image 30 series 2) are seen in the right hepatic lobe. The liver otherwise appears unremarkable.  Biliary System: No intrahepatic or extrahepatic biliary duct dilatation is seen.  Gallbladder: Surgical clips are seen in the gallbladder fossa which may reflect prior cholecystectomy.  Pancreas: The pancreas appears unremarkable.  Spleen: Small calcified granulomas are seen in an otherwise unremarkable appearing spleen.  Adrenals: The adrenal glands appear  unremarkable.  Kidneys: The kidneys appear unremarkable with no stones cysts masses or hydronephrosis.  Aorta: There is mild calcification of the abdominal aorta and its branches.  Bowel:  Esophagus: The visualized esophagus appears unremarkable.  Stomach: The stomach appears unremarkable.  Duodenum: Unremarkable appearing duodenum.  Small Bowel: The small bowel appears unremarkable.  Colon: Nondistended.  Appendix: The appendix is not identified but no inflammatory changes are seen in the right lower quadrant to suggest appendicitis.  Peritoneum: No intraperitoneal free air or ascites is seen.    Pelvis:  Bladder: The bladder is nondistended but appears otherwise unremarkable.  Female:  Uterus: The uterus is not identified.  Ovaries: No adnexal masses are seen.    Bony structures:  Dorsal Spine: There is mild multilevel spondylosis of the visualized dorsal spine.  Bony Pelvis: The visualized bony structures of the pelvis appear unremarkable.      Impression:  1. No acute intraabdominal or pelvic solid organ or bowel pathology identified. Details and other findings as discussed above.                                         Medications   diphenoxylate-atropine 2.5-0.025 mg per tablet 1 tablet (1 tablet Oral Given 10/14/24 0443)     Medical Decision Making  Rather unremarkable physical exam as well as unremarkable workup here in the ED.  CT scan is normal.  Patient does have a history of celiac disease and this may be a reactivation of that.  Her gastroenterologist is Dr. Ab davalos and she will be getting in touch with him in the morning.  In the meantime we will be prescribing Lomotil and giving a dose of Lomotil here prior to discharge.  This may assist with the diarrhea.  Should be noted that stool studies were done last week, an extensive number of stool studies, and all of these were negative.  There is no acute or emergent medical condition at this time.    Amount and/or Complexity of Data Reviewed  Labs:  ordered.  Radiology: ordered.                                      Clinical Impression:  Final diagnoses:  [K90.9, R19.7] Diarrhea due to malabsorption (Primary)          ED Disposition Condition    Discharge Stable          ED Prescriptions       Medication Sig Dispense Start Date End Date Auth. Provider    diphenoxylate-atropine 2.5-0.025 mg (LOMOTIL) 2.5-0.025 mg per tablet Take 1 tablet by mouth 4 (four) times daily as needed for Diarrhea. 20 tablet 10/14/2024 10/19/2024 Augusto Oliva MD          Follow-up Information       Follow up With Specialties Details Why Contact Info    Leander Rizo MD Gastroenterology Call in 1 day If symptoms worsen [address] [address]  [phone]               Augusto Oliva MD  10/14/24 [id number]

## 2024-10-18 ENCOUNTER — LAB VISIT (OUTPATIENT)
Dept: LAB | Facility: HOSPITAL | Age: 69
End: 2024-10-18
Attending: INTERNAL MEDICINE
Payer: COMMERCIAL

## 2024-10-18 DIAGNOSIS — R19.7 ACUTE DIARRHEA: Primary | ICD-10-CM

## 2024-10-18 DIAGNOSIS — R11.0 NAUSEA: ICD-10-CM

## 2024-10-18 DIAGNOSIS — R10.13 ABDOMINAL PAIN, EPIGASTRIC: ICD-10-CM

## 2024-10-18 DIAGNOSIS — Z85.118 HISTORY OF LUNG CANCER: ICD-10-CM

## 2024-10-18 LAB — FECAL LEUKOCYTE (OHS): NEGATIVE

## 2024-10-18 PROCEDURE — 82653 EL-1 FECAL QUANTITATIVE: CPT

## 2024-10-18 PROCEDURE — 89055 LEUKOCYTE ASSESSMENT FECAL: CPT

## 2024-10-22 ENCOUNTER — OFFICE VISIT (OUTPATIENT)
Dept: FAMILY MEDICINE | Facility: CLINIC | Age: 69
End: 2024-10-22
Payer: COMMERCIAL

## 2024-10-22 ENCOUNTER — TELEPHONE (OUTPATIENT)
Dept: FAMILY MEDICINE | Facility: CLINIC | Age: 69
End: 2024-10-22

## 2024-10-22 ENCOUNTER — HOSPITAL ENCOUNTER (OUTPATIENT)
Facility: HOSPITAL | Age: 69
Discharge: HOME | DRG: 093 | End: 2024-10-23
Attending: EMERGENCY MEDICINE | Admitting: INTERNAL MEDICINE
Payer: COMMERCIAL

## 2024-10-22 VITALS
DIASTOLIC BLOOD PRESSURE: 82 MMHG | HEIGHT: 66 IN | SYSTOLIC BLOOD PRESSURE: 118 MMHG | HEART RATE: 83 BPM | WEIGHT: 141 LBS | BODY MASS INDEX: 22.66 KG/M2 | OXYGEN SATURATION: 98 % | RESPIRATION RATE: 12 BRPM | TEMPERATURE: 98 F

## 2024-10-22 DIAGNOSIS — R26.9 ABNORMAL GAIT: Primary | ICD-10-CM

## 2024-10-22 DIAGNOSIS — R51.9 ACUTE INTRACTABLE HEADACHE, UNSPECIFIED HEADACHE TYPE: ICD-10-CM

## 2024-10-22 DIAGNOSIS — I67.82 CEREBRAL ISCHEMIA: ICD-10-CM

## 2024-10-22 DIAGNOSIS — R29.90 STROKE-LIKE SYMPTOMS: Primary | ICD-10-CM

## 2024-10-22 DIAGNOSIS — Z86.73 HISTORY OF TIA (TRANSIENT ISCHEMIC ATTACK): ICD-10-CM

## 2024-10-22 DIAGNOSIS — I63.9 STROKE: ICD-10-CM

## 2024-10-22 LAB
ALBUMIN SERPL-MCNC: 4.3 G/DL (ref 3.4–4.8)
ALBUMIN/GLOB SERPL: 1.4 RATIO (ref 1.1–2)
ALP SERPL-CCNC: 81 UNIT/L (ref 40–150)
ALT SERPL-CCNC: 20 UNIT/L (ref 0–55)
ANION GAP SERPL CALC-SCNC: 11 MEQ/L
AST SERPL-CCNC: 16 UNIT/L (ref 5–34)
BASOPHILS # BLD AUTO: 0.07 X10(3)/MCL
BASOPHILS NFR BLD AUTO: 0.7 %
BILIRUB SERPL-MCNC: 0.3 MG/DL
BUN SERPL-MCNC: 10.6 MG/DL (ref 9.8–20.1)
CALCIUM SERPL-MCNC: 9.8 MG/DL (ref 8.4–10.2)
CHLORIDE SERPL-SCNC: 108 MMOL/L (ref 98–107)
CHOLEST SERPL-MCNC: 154 MG/DL
CHOLEST/HDLC SERPL: 3 {RATIO} (ref 0–5)
CO2 SERPL-SCNC: 24 MMOL/L (ref 23–31)
CREAT SERPL-MCNC: 0.83 MG/DL (ref 0.55–1.02)
CREAT/UREA NIT SERPL: 13
CRP SERPL HS-MCNC: 2.07 MG/L
ELASTASE PANC STL-MCNT: 256 MCG/G
EOSINOPHIL # BLD AUTO: 0.28 X10(3)/MCL (ref 0–0.9)
EOSINOPHIL NFR BLD AUTO: 3 %
ERYTHROCYTE [DISTWIDTH] IN BLOOD BY AUTOMATED COUNT: 18.6 % (ref 11.5–17)
ERYTHROCYTE [SEDIMENTATION RATE] IN BLOOD: 4 MM/HR (ref 0–20)
EST. AVERAGE GLUCOSE BLD GHB EST-MCNC: 116.9 MG/DL
GFR SERPLBLD CREATININE-BSD FMLA CKD-EPI: >60 ML/MIN/1.73/M2
GLOBULIN SER-MCNC: 3 GM/DL (ref 2.4–3.5)
GLUCOSE SERPL-MCNC: 101 MG/DL (ref 82–115)
HBA1C MFR BLD: 5.7 %
HCT VFR BLD AUTO: 42.4 % (ref 37–47)
HDLC SERPL-MCNC: 53 MG/DL (ref 35–60)
HGB BLD-MCNC: 13.3 G/DL (ref 12–16)
IMM GRANULOCYTES # BLD AUTO: 0.08 X10(3)/MCL (ref 0–0.04)
IMM GRANULOCYTES NFR BLD AUTO: 0.8 %
INR PPP: 1
LDLC SERPL CALC-MCNC: 79 MG/DL (ref 50–140)
LYMPHOCYTES # BLD AUTO: 3.38 X10(3)/MCL (ref 0.6–4.6)
LYMPHOCYTES NFR BLD AUTO: 35.9 %
MCH RBC QN AUTO: 22.8 PG (ref 27–31)
MCHC RBC AUTO-ENTMCNC: 31.4 G/DL (ref 33–36)
MCV RBC AUTO: 72.7 FL (ref 80–94)
MONOCYTES # BLD AUTO: 0.53 X10(3)/MCL (ref 0.1–1.3)
MONOCYTES NFR BLD AUTO: 5.6 %
NEUTROPHILS # BLD AUTO: 5.08 X10(3)/MCL (ref 2.1–9.2)
NEUTROPHILS NFR BLD AUTO: 54 %
NRBC BLD AUTO-RTO: 0 %
PLATELET # BLD AUTO: 397 X10(3)/MCL (ref 130–400)
PMV BLD AUTO: 9.9 FL (ref 7.4–10.4)
POCT GLUCOSE: 87 MG/DL (ref 70–110)
POTASSIUM SERPL-SCNC: 4.3 MMOL/L (ref 3.5–5.1)
PROT SERPL-MCNC: 7.3 GM/DL (ref 5.8–7.6)
PROTHROMBIN TIME: 12.9 SECONDS (ref 12.5–14.5)
RBC # BLD AUTO: 5.83 X10(6)/MCL (ref 4.2–5.4)
SODIUM SERPL-SCNC: 143 MMOL/L (ref 136–145)
TRIGL SERPL-MCNC: 112 MG/DL (ref 37–140)
TSH SERPL-ACNC: 0.34 UIU/ML (ref 0.35–4.94)
VLDLC SERPL CALC-MCNC: 22 MG/DL
WBC # BLD AUTO: 9.42 X10(3)/MCL (ref 4.5–11.5)

## 2024-10-22 PROCEDURE — 99215 OFFICE O/P EST HI 40 MIN: CPT | Mod: ,,, | Performed by: FAMILY MEDICINE

## 2024-10-22 PROCEDURE — 1125F AMNT PAIN NOTED PAIN PRSNT: CPT | Mod: CPTII,,, | Performed by: FAMILY MEDICINE

## 2024-10-22 PROCEDURE — 80061 LIPID PANEL: CPT | Performed by: NURSE PRACTITIONER

## 2024-10-22 PROCEDURE — 85610 PROTHROMBIN TIME: CPT

## 2024-10-22 PROCEDURE — 3079F DIAST BP 80-89 MM HG: CPT | Mod: CPTII,,, | Performed by: FAMILY MEDICINE

## 2024-10-22 PROCEDURE — 1159F MED LIST DOCD IN RCRD: CPT | Mod: CPTII,,, | Performed by: FAMILY MEDICINE

## 2024-10-22 PROCEDURE — G0378 HOSPITAL OBSERVATION PER HR: HCPCS

## 2024-10-22 PROCEDURE — 25500020 PHARM REV CODE 255: Performed by: INTERNAL MEDICINE

## 2024-10-22 PROCEDURE — 99285 EMERGENCY DEPT VISIT HI MDM: CPT | Mod: 25

## 2024-10-22 PROCEDURE — 85025 COMPLETE CBC W/AUTO DIFF WBC: CPT

## 2024-10-22 PROCEDURE — 1101F PT FALLS ASSESS-DOCD LE1/YR: CPT | Mod: CPTII,,, | Performed by: FAMILY MEDICINE

## 2024-10-22 PROCEDURE — 84443 ASSAY THYROID STIM HORMONE: CPT

## 2024-10-22 PROCEDURE — 25000003 PHARM REV CODE 250: Performed by: EMERGENCY MEDICINE

## 2024-10-22 PROCEDURE — 3288F FALL RISK ASSESSMENT DOCD: CPT | Mod: CPTII,,, | Performed by: FAMILY MEDICINE

## 2024-10-22 PROCEDURE — 82962 GLUCOSE BLOOD TEST: CPT

## 2024-10-22 PROCEDURE — 3066F NEPHROPATHY DOC TX: CPT | Mod: CPTII,,, | Performed by: FAMILY MEDICINE

## 2024-10-22 PROCEDURE — 83036 HEMOGLOBIN GLYCOSYLATED A1C: CPT | Performed by: NURSE PRACTITIONER

## 2024-10-22 PROCEDURE — 1160F RVW MEDS BY RX/DR IN RCRD: CPT | Mod: CPTII,,, | Performed by: FAMILY MEDICINE

## 2024-10-22 PROCEDURE — 85652 RBC SED RATE AUTOMATED: CPT | Performed by: NURSE PRACTITIONER

## 2024-10-22 PROCEDURE — 3008F BODY MASS INDEX DOCD: CPT | Mod: CPTII,,, | Performed by: FAMILY MEDICINE

## 2024-10-22 PROCEDURE — 3074F SYST BP LT 130 MM HG: CPT | Mod: CPTII,,, | Performed by: FAMILY MEDICINE

## 2024-10-22 PROCEDURE — 25000003 PHARM REV CODE 250: Performed by: NURSE PRACTITIONER

## 2024-10-22 PROCEDURE — 86141 C-REACTIVE PROTEIN HS: CPT | Performed by: NURSE PRACTITIONER

## 2024-10-22 PROCEDURE — 11000001 HC ACUTE MED/SURG PRIVATE ROOM

## 2024-10-22 PROCEDURE — 3061F NEG MICROALBUMINURIA REV: CPT | Mod: CPTII,,, | Performed by: FAMILY MEDICINE

## 2024-10-22 PROCEDURE — 36415 COLL VENOUS BLD VENIPUNCTURE: CPT | Performed by: NURSE PRACTITIONER

## 2024-10-22 PROCEDURE — 80053 COMPREHEN METABOLIC PANEL: CPT

## 2024-10-22 PROCEDURE — 3044F HG A1C LEVEL LT 7.0%: CPT | Mod: CPTII,,, | Performed by: FAMILY MEDICINE

## 2024-10-22 PROCEDURE — 2023F DILAT RTA XM W/O RTNOPTHY: CPT | Mod: CPTII,,, | Performed by: FAMILY MEDICINE

## 2024-10-22 PROCEDURE — A9577 INJ MULTIHANCE: HCPCS | Performed by: INTERNAL MEDICINE

## 2024-10-22 RX ORDER — ASPIRIN 81 MG/1
81 TABLET ORAL DAILY
Status: DISCONTINUED | OUTPATIENT
Start: 2024-10-23 | End: 2024-10-23 | Stop reason: HOSPADM

## 2024-10-22 RX ORDER — IBUPROFEN 200 MG
24 TABLET ORAL
Status: DISCONTINUED | OUTPATIENT
Start: 2024-10-22 | End: 2024-10-23 | Stop reason: HOSPADM

## 2024-10-22 RX ORDER — PRAVASTATIN SODIUM 10 MG/1
10 TABLET ORAL DAILY
Status: DISCONTINUED | OUTPATIENT
Start: 2024-10-23 | End: 2024-10-23 | Stop reason: HOSPADM

## 2024-10-22 RX ORDER — VENLAFAXINE HYDROCHLORIDE 37.5 MG/1
75 CAPSULE, EXTENDED RELEASE ORAL DAILY
Status: DISCONTINUED | OUTPATIENT
Start: 2024-10-23 | End: 2024-10-23 | Stop reason: HOSPADM

## 2024-10-22 RX ORDER — GLUCAGON 1 MG
1 KIT INJECTION
Status: DISCONTINUED | OUTPATIENT
Start: 2024-10-22 | End: 2024-10-23 | Stop reason: HOSPADM

## 2024-10-22 RX ORDER — BISACODYL 10 MG/1
10 SUPPOSITORY RECTAL DAILY PRN
Status: DISCONTINUED | OUTPATIENT
Start: 2024-10-22 | End: 2024-10-23 | Stop reason: HOSPADM

## 2024-10-22 RX ORDER — ACETAMINOPHEN 325 MG/1
650 TABLET ORAL EVERY 6 HOURS PRN
Status: DISCONTINUED | OUTPATIENT
Start: 2024-10-22 | End: 2024-10-23 | Stop reason: HOSPADM

## 2024-10-22 RX ORDER — LEVOTHYROXINE SODIUM 100 UG/1
100 TABLET ORAL
Status: DISCONTINUED | OUTPATIENT
Start: 2024-10-23 | End: 2024-10-23 | Stop reason: HOSPADM

## 2024-10-22 RX ORDER — LABETALOL HYDROCHLORIDE 5 MG/ML
10 INJECTION, SOLUTION INTRAVENOUS
Status: DISCONTINUED | OUTPATIENT
Start: 2024-10-22 | End: 2024-10-23 | Stop reason: HOSPADM

## 2024-10-22 RX ORDER — SODIUM CHLORIDE 0.9 % (FLUSH) 0.9 %
10 SYRINGE (ML) INJECTION EVERY 6 HOURS PRN
Status: DISCONTINUED | OUTPATIENT
Start: 2024-10-22 | End: 2024-10-23 | Stop reason: HOSPADM

## 2024-10-22 RX ORDER — IBUPROFEN 200 MG
16 TABLET ORAL
Status: DISCONTINUED | OUTPATIENT
Start: 2024-10-22 | End: 2024-10-23 | Stop reason: HOSPADM

## 2024-10-22 RX ORDER — DILTIAZEM HYDROCHLORIDE 120 MG/1
120 CAPSULE, COATED, EXTENDED RELEASE ORAL DAILY
Status: DISCONTINUED | OUTPATIENT
Start: 2024-10-23 | End: 2024-10-23 | Stop reason: HOSPADM

## 2024-10-22 RX ORDER — ONDANSETRON HYDROCHLORIDE 2 MG/ML
4 INJECTION, SOLUTION INTRAVENOUS EVERY 4 HOURS PRN
Status: DISCONTINUED | OUTPATIENT
Start: 2024-10-22 | End: 2024-10-23 | Stop reason: HOSPADM

## 2024-10-22 RX ORDER — PANTOPRAZOLE SODIUM 40 MG/1
40 TABLET, DELAYED RELEASE ORAL 2 TIMES DAILY
Status: DISCONTINUED | OUTPATIENT
Start: 2024-10-22 | End: 2024-10-23 | Stop reason: HOSPADM

## 2024-10-22 RX ORDER — HYDRALAZINE HYDROCHLORIDE 20 MG/ML
10 INJECTION INTRAMUSCULAR; INTRAVENOUS EVERY 6 HOURS PRN
Status: DISCONTINUED | OUTPATIENT
Start: 2024-10-22 | End: 2024-10-23 | Stop reason: HOSPADM

## 2024-10-22 RX ORDER — INSULIN ASPART 100 [IU]/ML
0-10 INJECTION, SOLUTION INTRAVENOUS; SUBCUTANEOUS
Status: DISCONTINUED | OUTPATIENT
Start: 2024-10-22 | End: 2024-10-23 | Stop reason: HOSPADM

## 2024-10-22 RX ORDER — ASPIRIN 325 MG
325 TABLET, DELAYED RELEASE (ENTERIC COATED) ORAL
Status: COMPLETED | OUTPATIENT
Start: 2024-10-22 | End: 2024-10-22

## 2024-10-22 RX ADMIN — GADOBENATE DIMEGLUMINE 15 ML: 529 INJECTION, SOLUTION INTRAVENOUS at 11:10

## 2024-10-22 RX ADMIN — METOPROLOL SUCCINATE 12.5 MG: 25 TABLET, EXTENDED RELEASE ORAL at 11:10

## 2024-10-22 RX ADMIN — PANTOPRAZOLE SODIUM 40 MG: 40 TABLET, DELAYED RELEASE ORAL at 11:10

## 2024-10-22 RX ADMIN — ASPIRIN 325 MG: 325 TABLET, COATED ORAL at 06:10

## 2024-10-22 NOTE — PROGRESS NOTES
"Jacklyn Appiah  10/22/2024  [id number]    Daniela Ward MD  Patient Care Team:  Daniela Ward MD as PCP - General (Family Medicine)  Leander Norman MD as Consulting Physician (Cardiology)  Carolina Wilcox MD (Inactive) as Consulting Physician (Hematology and Oncology)  Ernie Kitchen MD as Consulting Physician (Endocrinology)  Jena Larsen MD as Consulting Physician (Cardiothoracic Surgery)  Boo Friend MD as Consulting Physician (Gastroenterology)  Usha Felder MD as Consulting Physician (Medical Oncology)  Harry Ewing MD as Consulting Physician (Neurology)  Valorie Iqbal MD (Otolaryngology)  Abdoulaye Aviles MD (Ophthalmology)      Chief Complaint:  Chief Complaint   Patient presents with    Follow-up     Patient c/o feeling like she is shifting to the left when she is walking       History of Present Illness:    69 y.o. female who presents today c/o acute onset of a headache that started last night and now drifting to the left when she walks. NO vision changes, extremity weakness, paresthesias, n/v. Does not have a history of headaches. Describes the headache as moderate to severe and located around entire head with "pressure" at crown of head. States she denies dizziness or vertigo, but when she walks she cannot walk straight. She does have a history of tia with ischemic changes on MRI. ON low dose statin and aspirin. No recent falls or head injury.     Review of Systems  General: denies f/c, weight loss, night sweats, decreased appetite  Eye: denies blurred vision, changes in vision  Respiratory: denies sob, wheezing, cough  Cardiovascular: denies chest pain, palpitations, edema  Gastrointestinal: denies abdominal pain, n/v, constipation, diarrhea  Integumentary: denies rashes, pruritis    Past Medical History  Past Medical History:   Diagnosis Date    Acute anal fissure     Adenocarcinoma of left lung     Anxiety disorder, unspecified     Mukherjee's " esophagus     Bilateral stenosis of carotid arteries greater than 50%     Celiac disease     Chronic cerebral ischemia     Diabetes mellitus, type 2     Esophageal spasm     Family history of breast cancer in mother     GERD (gastroesophageal reflux disease)     Heart attack     Heart disease     History of cervical cancer     History of lobectomy of lung     History of TIA (transient ischemic attack)     Hyperlipidemia     Hypertension     Hypothyroidism, postsurgical     Irregular heart beat     Long-term use of high-risk medication     Lumbar radiculopathy     Lumbar spinal stenosis     Lung mass     Major depression in complete remission     Migraine with aura     Osteopenia     Osteopenia after menopause     Painful defecation     Panic disorder (episodic paroxysmal anxiety)     Personal history of colonic polyps 02/10/2022    Rectal bleeding     Restless leg syndrome     Seizures     Spinal stenosis of lumbar region with radiculopathy     Statin-induced myositis     Stroke     Thyroid disease     Vitamin D deficiency        Medications  Medication List with Changes/Refills   Current Medications    ALPRAZOLAM (XANAX) 0.5 MG TABLET    Take 1 tablet (0.5 mg total) by mouth nightly.    ASPIRIN (ECOTRIN) 81 MG EC TABLET    Take 81 mg by mouth once daily.    CALCIUM/MAGNESIUM (CALCIUM AND MAGNESIUM ORAL)        DILTIAZEM (CARDIZEM CD) 120 MG CP24    Take 120 mg by mouth once daily.    ERGOCALCIFEROL (ERGOCALCIFEROL) 50,000 UNIT CAP    Take 50,000 Units by mouth every 7 days.    FAMOTIDINE (PEPCID) 40 MG TABLET    Take 1 tablet by mouth every evening.    FEXOFENADINE (ALLEGRA) 180 MG TABLET    Take 180 mg by mouth once daily.    LEVOTHYROXINE (SYNTHROID) 100 MCG TABLET    Take 1 tablet (100 mcg total) by mouth before breakfast.    METFORMIN (GLUCOPHAGE) 1000 MG TABLET    Take 1 tablet (1,000 mg total) by mouth 2 (two) times daily with meals.    METOPROLOL SUCCINATE (TOPROL-XL) 25 MG 24 HR TABLET    Take 12.5 mg by  mouth nightly.    ONDANSETRON (ZOFRAN) 8 MG TABLET    Take 1 tablet (8 mg total) by mouth every 8 (eight) hours as needed for Nausea.    PANTOPRAZOLE (PROTONIX) 40 MG TABLET    Take 40 mg by mouth once daily.    PRAVASTATIN (PRAVACHOL) 10 MG TABLET    TAKE 1 TABLET BY MOUTH EVERY DAY    VENLAFAXINE (EFFEXOR-XR) 75 MG 24 HR CAPSULE    TAKE 1 CAPSULE BY MOUTH EVERY DAY   Discontinued Medications    HYOSCYAMINE (LEVSIN/SL) 0.125 MG SUBL    Place 1 tablet (0.125 mg total) under the tongue every 4 (four) hours as needed.       Past Surgical History:   Procedure Laterality Date    APPENDECTOMY  1992    CERVICAL BIOPSY  1979    CHOLECYSTECTOMY  1992    COLONOSCOPY  10/15/2018    Dr Leander Rizo    COLONOSCOPY W/ BIOPSIES AND POLYPECTOMY  02/10/2022    Dr Boo Friend    EGD, WITH BALLOON DILATION  12/21/2020    Dr Leander Rizo    ESOPHAGOGASTRODUODENOSCOPY  05/18/2016    Dr Leander Rizo    THORACOSCOPY  03/19/2021    Dr Marie Allam    TONSILLECTOMY, ADENOIDECTOMY  1968    TOTAL ABDOMINAL HYSTERECTOMY  1988    TOTAL THYROIDECTOMY  05/2000    TOTAL THYROIDECTOMY  04/2000       SUBJECTIVE:  Health Maintenance  Health Maintenance Topics with due status: Not Due       Topic Last Completion Date    TETANUS VACCINE 05/18/2020    Colorectal Cancer Screening 02/10/2022    DEXA Scan 11/10/2023    Lipid Panel 03/13/2024    Eye Exam 03/19/2024    Mammogram 05/01/2024    Diabetes Urine Screening 06/19/2024    Hemoglobin A1c 06/19/2024     Health Maintenance Due   Topic Date Due    Foot Exam  Never done    Shingles Vaccine (1 of 2) Never done    High Dose Statin  Never done    RSV Vaccine (Age 60+ and Pregnant patients) (1 - Risk 60-74 years 1-dose series) Never done    Influenza Vaccine (1) 09/01/2024    COVID-19 Vaccine (8 - 2024-25 season) 09/01/2024       Exam:  Vitals:    10/22/24 1120   BP: 118/82   BP Location: Left arm   Patient Position: Sitting   Pulse: 83   Resp: 12   Temp: 98.2 °F (36.8 °C)   TempSrc: Temporal  "  SpO2: 98%   Weight: 64 kg (141 lb)   Height: 5' 6" (1.676 m)     Weight: 64 kg (141 lb)   Body mass index is 22.76 kg/m².      Physical Exam  Constitutional: NAD, alert, pleasant  Respiratory: CTAB, no wheezes, rales or rhonchi. No accessory muscle use  Eyes: EOMI, PERRLA  Neg pronate drift. Abnormal finger to nose. Normal rapid alternating hands. Patient drifts to left when walking. Neg rhomberg. 5/5 ue and le strength  Cardiovascular: RRR, No m/r/g. No JVD. No LE edema  Integumentary: warm, dry, intact  Psych: AA&Ox3      ICD-10-CM ICD-9-CM   1. Abnormal gait  R26.9 781.2   2. Acute intractable headache, unspecified headache type  R51.9 784.0   3. History of TIA (transient ischemic attack)  Z86.73 V12.54   4. Cerebral ischemia  I67.82 437.1       1. Abnormal gait    2. Acute intractable headache, unspecified headache type    3. History of TIA (transient ischemic attack)    4. Cerebral ischemia       Due to risk of CVA being high and with new onset headache with changes in gait, patient is going to the ER. Ambulance was offered, she declined    Follow up: No follow-ups on file.      Care Plan/Goals: Reviewed   Goals    None               "

## 2024-10-22 NOTE — TELEPHONE ENCOUNTER
----- Message from Ana Rosa sent at 10/22/2024  7:30 AM CDT -----  Regarding: headache  .Type:  Needs Medical Advice    Who Called: pt  Symptoms (please be specific): headache   How long has patient had these symptoms:  1 day dizzy  Pharmacy name and phone #:    Would the patient rather a call back or a response via [facility]?   Best Call Back Number: [phone]   Additional Information:

## 2024-10-22 NOTE — ED PROVIDER NOTES
Encounter Date: 10/22/2024    SCRIBE #1 NOTE: I, Shanta Buckner, am scribing for, and in the presence of,  Shahbaz Leiva MD. I have scribed the following portions of the note - Other sections scribed: HPI, ROS, PE.       History     Chief Complaint   Patient presents with    Headache    Gait Problem     C/o HA onset last night and gait issues onset this morning around 0800. States when she walks she feels like she's going towards the left. Denies unilateral extremity weakness/blurred vision/slurred speech/facial droop. Pt ambulatory with steady gait to triage. GCS 15. Denies blood thinners.     Patient is a 69 year old female with a reported history of vertigo, DM, TIA, and lung cancer that presents to the ED for a headache onset last night. She also complains of an unsteady gait. She reports she woke up feeling unbalanced and like she is walking to the left. She denies dizziness and weakness. She also reports diarrhea for 2 weeks, which is gradually improving. She denies fever, nausea, vomiting, chest pain, shortness of breath, and abdominal pain.     Patient reports her symptoms are not consistent with history of vertigo.     The history is provided by the patient and medical records.   Headache   This is a new problem. The current episode started yesterday. The problem has been unchanged. Pertinent negatives include no abdominal pain, back pain, dizziness, eye pain, fever, nausea, neck pain, rhinorrhea, vomiting or weakness.     Review of patient's allergies indicates:   Allergen Reactions    Gluten Rash     Past Medical History:   Diagnosis Date    Acute anal fissure     Adenocarcinoma of left lung     Anxiety disorder, unspecified     Mukherjee's esophagus     Bilateral stenosis of carotid arteries greater than 50%     Celiac disease     Chronic cerebral ischemia     Diabetes mellitus, type 2     Esophageal spasm     Family history of breast cancer in mother     GERD (gastroesophageal reflux disease)     Heart  attack     Heart disease     History of cervical cancer     History of lobectomy of lung     History of TIA (transient ischemic attack)     Hyperlipidemia     Hypertension     Hypothyroidism, postsurgical     Irregular heart beat     Long-term use of high-risk medication     Lumbar radiculopathy     Lumbar spinal stenosis     Lung mass     Major depression in complete remission     Migraine with aura     Osteopenia     Osteopenia after menopause     Painful defecation     Panic disorder (episodic paroxysmal anxiety)     Personal history of colonic polyps 02/10/2022    Rectal bleeding     Restless leg syndrome     Seizures     Spinal stenosis of lumbar region with radiculopathy     Statin-induced myositis     Stroke     Thyroid disease     Vitamin D deficiency      Past Surgical History:   Procedure Laterality Date    APPENDECTOMY  1992    CERVICAL BIOPSY  1979    CHOLECYSTECTOMY  1992    COLONOSCOPY  10/15/2018    Dr Leander Rizo    COLONOSCOPY W/ BIOPSIES AND POLYPECTOMY  02/10/2022    Dr Boo Friend    EGD, WITH BALLOON DILATION  12/21/2020    Dr Leander Rizo    ESOPHAGOGASTRODUODENOSCOPY  05/18/2016    Dr Leander Rizo    THORACOSCOPY  03/19/2021    Dr Marie Allam    TONSILLECTOMY, ADENOIDECTOMY  1968    TOTAL ABDOMINAL HYSTERECTOMY  1988    TOTAL THYROIDECTOMY  05/2000    TOTAL THYROIDECTOMY  04/2000     Family History   Problem Relation Name Age of Onset    Breast cancer Mother Flaquita Pradhan     Diabetes Mellitus Mother Flaquita Pradhan     Heart disease Mother Flaquita Pradhan     Hyperlipidemia Mother Flaquita Pradhan     Hypertension Mother Flaquita Pradhan     Kidney disease Mother Flaquita Pradhan     Stroke Mother Flaquita Pradhan     COPD Mother Flaquita Pradhan     Depression Mother Flaquita Pradhan     Diabetes Mother Flaquita Pradhan     Mental illness Mother Flaquita Pradhan     Cancer Mother Flaquita Pradhan     Alzheimer's disease Father Alvin Pradhan Sr.     Arthritis Father Alvin Pradhan Sr.     Cancer Father Alvin Pradhan Sr.     Heart  attack Brother Alvin Pradhan .     Heart disease Brother Alvin Pradhan Jr.     Alcohol abuse Brother Alvin Pradhan Jr.     Heart disease Maternal Grandmother Unia Kiran     Lung cancer Maternal Grandfather      Breast cancer Paternal Grandmother Herbert Everett     Cancer Paternal Grandmother Herbert Everett      Social History     Tobacco Use    Smoking status: Former     Current packs/day: 0.00     Average packs/day: 1.5 packs/day for 15.0 years (22.5 ttl pk-yrs)     Types: Cigarettes     Start date: 3/18/2006     Quit date: 3/18/2021     Years since quitting: 3.6    Smokeless tobacco: Never   Substance Use Topics    Alcohol use: Never    Drug use: Never     Review of Systems   Constitutional:  Negative for fatigue, fever and unexpected weight change.   HENT:  Negative for congestion and rhinorrhea.    Eyes:  Negative for pain.   Respiratory:  Negative for chest tightness, shortness of breath and wheezing.    Cardiovascular:  Negative for chest pain.   Gastrointestinal:  Positive for diarrhea. Negative for abdominal pain, constipation, nausea and vomiting.   Genitourinary:  Negative for dysuria.   Musculoskeletal:  Negative for back pain and neck pain.   Skin:  Negative for rash.   Allergic/Immunologic: Negative for environmental allergies, food allergies and immunocompromised state.   Neurological:  Positive for headaches. Negative for dizziness, speech difficulty and weakness.        Unbalanced, unsteady gait   Hematological:  Does not bruise/bleed easily.   Psychiatric/Behavioral:  Negative for sleep disturbance and suicidal ideas.        Physical Exam     Initial Vitals [10/22/24 1220]   BP Pulse Resp Temp SpO2   118/74 93 18 97.9 °F (36.6 °C) 98 %      MAP       --         Physical Exam    Nursing note and vitals reviewed.  Constitutional: No distress.   HENT:   Head: Normocephalic and atraumatic.   Neck: Trachea normal.   Cardiovascular:  Normal rate and regular rhythm.           No murmur  heard.  Pulmonary/Chest: Breath sounds normal. No respiratory distress.   Abdominal: Abdomen is soft. Bowel sounds are normal. She exhibits no distension. There is no abdominal tenderness.   Musculoskeletal:         General: Normal range of motion.      Lumbar back: Normal range of motion.     Neurological: She is alert and oriented to person, place, and time.   Mildly ataxic to left hand and left finger. Unsteady on feet.    Skin: Skin is warm and dry. No rash noted.   Psychiatric: She has a normal mood and affect. Judgment normal.         ED Course   Procedures  Labs Reviewed   COMPREHENSIVE METABOLIC PANEL - Abnormal       Result Value    Sodium 143      Potassium 4.3      Chloride 108 (*)     CO2 24      Glucose 101      Blood Urea Nitrogen 10.6      Creatinine 0.83      Calcium 9.8      Protein Total 7.3      Albumin 4.3      Globulin 3.0      Albumin/Globulin Ratio 1.4      Bilirubin Total 0.3      ALP 81      ALT 20      AST 16      eGFR >60      Anion Gap 11.0      BUN/Creatinine Ratio 13     TSH - Abnormal    TSH 0.335 (*)    CBC WITH DIFFERENTIAL - Abnormal    WBC 9.42      RBC 5.83 (*)     Hgb 13.3      Hct 42.4      MCV 72.7 (*)     MCH 22.8 (*)     MCHC 31.4 (*)     RDW 18.6 (*)     Platelet 397      MPV 9.9      Neut % 54.0      Lymph % 35.9      Mono % 5.6      Eos % 3.0      Basophil % 0.7      Lymph # 3.38      Neut # 5.08      Mono # 0.53      Eos # 0.28      Baso # 0.07      IG# 0.08 (*)     IG% 0.8      NRBC% 0.0     PROTIME-INR - Normal    PT 12.9      INR 1.0     CBC W/ AUTO DIFFERENTIAL    Narrative:     The following orders were created for panel order CBC auto differential.  Procedure                               Abnormality         Status                     ---------                               -----------         ------                     CBC with Differential[[id number]]       Abnormal            Final result                 Please view results for these tests on the individual  orders.          Imaging Results              CT Head Without Contrast (Final result)  Result time 10/22/24 13:42:40      Final result by Ketan Hernández MD (10/22/24 13:42:40)                   Impression:      No acute intracranial findings identified.      Electronically signed by: Ketan Henrández  Date:    10/22/2024  Time:    13:42               Narrative:    EXAMINATION:  CT HEAD WITHOUT CONTRAST    CLINICAL HISTORY:  Headache, sudden, severe;Neuro deficit, acute, stroke suspected;    TECHNIQUE:  Sequential axial images were performed of the brain without contrast.    Dose product length of 893 mGycm. Automated exposure control was utilized to minimize radiation dose.    COMPARISON:  MRI brain January 20, 2020.    FINDINGS:  There is no intracranial mass effect, midline shift, hydrocephalus or hemorrhage. There is no sulcal effacement or low attenuation changes to suggest recent large vessel territory infarction.  The ventricular system and sulcal markings prominence is consistent with mild atrophy.  There is no acute extra axial fluid collection. Visualized paranasal sinuses are clear without mucosal thickening, polypoidal abnormality or air-fluid levels. Mastoid air cells aeration is optimal.                                       Medications   aspirin EC tablet 325 mg (has no administration in time range)     Medical Decision Making  Differential diagnosis includes, but is not limited to, vertigo, CVA, cerebellar insuffiencey, and electrolyte disturbance.     Patient with an NIH of 0 questionable off finger-to-nose gait observed with a leftward gait positive no wide-based.  Discussed case with neurology will admit start aspirin discussed case with [facility] [place]    Problems Addressed:  Stroke-like symptoms: complicated acute illness or injury that poses a threat to life or bodily functions    Amount and/or Complexity of Data Reviewed  Labs: ordered.  Radiology: ordered and independent interpretation  performed.    Risk  Decision regarding hospitalization.            Scribe Attestation:   Scribe #1: I performed the above scribed service and the documentation accurately describes the services I performed. I attest to the accuracy of the note.    Attending Attestation:           Physician Attestation for Scribe:  Physician Attestation Statement for Scribe #1: I, Shahbaz Leiva MD, reviewed documentation, as scribed by Shanta Buckner in my presence, and it is both accurate and complete.             ED Course as of 10/22/24 1826   Tue Oct 22, 2024   1716 Paged stroke neurology  [ED]   1719 Paged hospital medicine  [ED]   1725 Dw dr diaz, [facility] MRI/MRI [FK]   1727 Patient with an NIH of 0 but patient with worrisome symptoms for a PICA infarct discussed case with neurology will admit [FK]   1816 Discussed with hospital medicine  [ED]      ED Course User Index  [ED] Shanta Buckner  [FK] Shahbaz Leiva III, MD                           Clinical Impression:  Final diagnoses:  [R29.90] Stroke-like symptoms (Primary)          ED Disposition Condition    Admit Stable                Shahbaz Leiva III, MD  10/22/24 1826

## 2024-10-22 NOTE — FIRST PROVIDER EVALUATION
Medical screening examination initiated.  I have conducted a focused provider triage encounter, findings are as follows:    Brief history of present illness:  69 year old female presents to ER with c/o headache onset yesterday. States she feels like she is walking to the left    Vitals:    10/22/24 1220   BP: 118/74   Pulse: 93   Resp: 18   Temp: 97.9 °F (36.6 °C)   TempSrc: Oral   SpO2: 98%   Weight: 64 kg (141 lb)       Pertinent physical exam:  awake and alert, NAD    Brief workup plan:  labs, imaging     Preliminary workup initiated; this workup will be continued and followed by the physician or advanced practice provider that is assigned to the patient when roomed.

## 2024-10-23 VITALS
BODY MASS INDEX: 22.66 KG/M2 | OXYGEN SATURATION: 95 % | HEIGHT: 66 IN | RESPIRATION RATE: 18 BRPM | TEMPERATURE: 99 F | DIASTOLIC BLOOD PRESSURE: 68 MMHG | HEART RATE: 72 BPM | SYSTOLIC BLOOD PRESSURE: 113 MMHG | WEIGHT: 141 LBS

## 2024-10-23 LAB
ALBUMIN SERPL-MCNC: 3.7 G/DL (ref 3.4–4.8)
ALBUMIN/GLOB SERPL: 1.6 RATIO (ref 1.1–2)
ALP SERPL-CCNC: 66 UNIT/L (ref 40–150)
ALT SERPL-CCNC: 15 UNIT/L (ref 0–55)
ANION GAP SERPL CALC-SCNC: 7 MEQ/L
APICAL FOUR CHAMBER EJECTION FRACTION: 63 %
APICAL TWO CHAMBER EJECTION FRACTION: 65 %
AST SERPL-CCNC: 13 UNIT/L (ref 5–34)
AV INDEX (PROSTH): 1.09
AV MEAN GRADIENT: 3 MMHG
AV PEAK GRADIENT: 4.8 MMHG
AV VALVE AREA BY VELOCITY RATIO: 3.4 CM²
AV VALVE AREA: 3.4 CM²
AV VELOCITY RATIO: 1.09
BASOPHILS # BLD AUTO: 0.06 X10(3)/MCL
BASOPHILS NFR BLD AUTO: 0.6 %
BILIRUB SERPL-MCNC: 0.3 MG/DL
BSA FOR ECHO PROCEDURE: 1.73 M2
BUN SERPL-MCNC: 13 MG/DL (ref 9.8–20.1)
CALCIUM SERPL-MCNC: 9 MG/DL (ref 8.4–10.2)
CHLORIDE SERPL-SCNC: 108 MMOL/L (ref 98–107)
CO2 SERPL-SCNC: 27 MMOL/L (ref 23–31)
CREAT SERPL-MCNC: 0.78 MG/DL (ref 0.55–1.02)
CREAT/UREA NIT SERPL: 17
CV ECHO LV RWT: 0.56 CM
DOP CALC AO PEAK VEL: 1.1 M/S
DOP CALC AO VTI: 25.8 CM
DOP CALC LVOT AREA: 3.1 CM2
DOP CALC LVOT DIAMETER: 2 CM
DOP CALC LVOT PEAK VEL: 1.2 M/S
DOP CALC LVOT STROKE VOLUME: 88.5 CM3
DOP CALC MV VTI: 21 CM
DOP CALCLVOT PEAK VEL VTI: 28.2 CM
E WAVE DECELERATION TIME: 219 MSEC
E/A RATIO: 0.93
E/E' RATIO: 7.44 M/S
ECHO LV POSTERIOR WALL: 1.2 CM (ref 0.6–1.1)
EOSINOPHIL # BLD AUTO: 0.38 X10(3)/MCL (ref 0–0.9)
EOSINOPHIL NFR BLD AUTO: 3.8 %
ERYTHROCYTE [DISTWIDTH] IN BLOOD BY AUTOMATED COUNT: 17.4 % (ref 11.5–17)
FRACTIONAL SHORTENING: 34.9 % (ref 28–44)
GFR SERPLBLD CREATININE-BSD FMLA CKD-EPI: >60 ML/MIN/1.73/M2
GLOBULIN SER-MCNC: 2.3 GM/DL (ref 2.4–3.5)
GLUCOSE SERPL-MCNC: 130 MG/DL (ref 82–115)
HCT VFR BLD AUTO: 36.8 % (ref 37–47)
HGB BLD-MCNC: 11.5 G/DL (ref 12–16)
HR MV ECHO: 66 BPM
IMM GRANULOCYTES # BLD AUTO: 0.07 X10(3)/MCL (ref 0–0.04)
IMM GRANULOCYTES NFR BLD AUTO: 0.7 %
INTERVENTRICULAR SEPTUM: 1 CM (ref 0.6–1.1)
LEFT ATRIUM AREA SYSTOLIC (APICAL 2 CHAMBER): 12.6 CM2
LEFT ATRIUM AREA SYSTOLIC (APICAL 4 CHAMBER): 13.7 CM2
LEFT ATRIUM SIZE: 3.3 CM
LEFT ATRIUM VOLUME INDEX MOD: 17 ML/M2
LEFT ATRIUM VOLUME MOD: 29.2 ML
LEFT CCA DIST DIAS: 14 CM/S
LEFT CCA DIST SYS: 58 CM/S
LEFT CCA PROX DIAS: 23 CM/S
LEFT CCA PROX SYS: 80 CM/S
LEFT ECA DIAS: 6 CM/S
LEFT ECA SYS: 63 CM/S
LEFT ICA DIST DIAS: 47 CM/S
LEFT ICA DIST SYS: 112 CM/S
LEFT ICA PROX DIAS: 26 CM/S
LEFT ICA PROX SYS: 81 CM/S
LEFT INTERNAL DIMENSION IN SYSTOLE: 2.8 CM (ref 2.1–4)
LEFT VENTRICLE DIASTOLIC VOLUME INDEX: 48.31 ML/M2
LEFT VENTRICLE DIASTOLIC VOLUME: 83.1 ML
LEFT VENTRICLE END DIASTOLIC VOLUME APICAL 2 CHAMBER: 56.8 ML
LEFT VENTRICLE END DIASTOLIC VOLUME APICAL 4 CHAMBER: 60.4 ML
LEFT VENTRICLE END SYSTOLIC VOLUME APICAL 2 CHAMBER: 27.5 ML
LEFT VENTRICLE END SYSTOLIC VOLUME APICAL 4 CHAMBER: 29.2 ML
LEFT VENTRICLE MASS INDEX: 94.7 G/M2
LEFT VENTRICLE SYSTOLIC VOLUME INDEX: 17.2 ML/M2
LEFT VENTRICLE SYSTOLIC VOLUME: 29.6 ML
LEFT VENTRICULAR INTERNAL DIMENSION IN DIASTOLE: 4.3 CM (ref 3.5–6)
LEFT VENTRICULAR MASS: 162.9 G
LEFT VERTEBRAL DIAS: 10 CM/S
LEFT VERTEBRAL SYS: 35 CM/S
LV LATERAL E/E' RATIO: 7.44 M/S
LV SEPTAL E/E' RATIO: 7.44 M/S
LVED V (TEICH): 83.1 ML
LVES V (TEICH): 29.6 ML
LVOT MG: 3 MMHG
LVOT MV: 0.85 CM/S
LYMPHOCYTES # BLD AUTO: 3.44 X10(3)/MCL (ref 0.6–4.6)
LYMPHOCYTES NFR BLD AUTO: 34.5 %
MAGNESIUM SERPL-MCNC: 2.1 MG/DL (ref 1.6–2.6)
MCH RBC QN AUTO: 22.6 PG (ref 27–31)
MCHC RBC AUTO-ENTMCNC: 31.3 G/DL (ref 33–36)
MCV RBC AUTO: 72.3 FL (ref 80–94)
MONOCYTES # BLD AUTO: 0.76 X10(3)/MCL (ref 0.1–1.3)
MONOCYTES NFR BLD AUTO: 7.6 %
MV MEAN GRADIENT: 1 MMHG
MV PEAK A VEL: 0.72 M/S
MV PEAK E VEL: 0.67 M/S
MV PEAK GRADIENT: 3 MMHG
MV STENOSIS PRESSURE HALF TIME: 78 MS
MV VALVE AREA BY CONTINUITY EQUATION: 4.22 CM2
MV VALVE AREA P 1/2 METHOD: 2.82 CM2
NEUTROPHILS # BLD AUTO: 5.27 X10(3)/MCL (ref 2.1–9.2)
NEUTROPHILS NFR BLD AUTO: 52.8 %
NRBC BLD AUTO-RTO: 0 %
OHS CV CAROTID RIGHT ICA EDV HIGHEST: 38
OHS CV CAROTID ULTRASOUND LEFT ICA/CCA RATIO: 1.93
OHS CV CAROTID ULTRASOUND RIGHT ICA/CCA RATIO: 1.61
OHS CV PV CAROTID LEFT HIGHEST CCA: 80
OHS CV PV CAROTID LEFT HIGHEST ICA: 112
OHS CV PV CAROTID RIGHT HIGHEST CCA: 83
OHS CV PV CAROTID RIGHT HIGHEST ICA: 98
OHS CV US CAROTID LEFT HIGHEST EDV: 47
OHS LV EJECTION FRACTION SIMPSONS BIPLANE MOD: 64 %
PHOSPHATE SERPL-MCNC: 3.8 MG/DL (ref 2.3–4.7)
PISA TR MAX VEL: 2.1 M/S
PLATELET # BLD AUTO: 350 X10(3)/MCL (ref 130–400)
PMV BLD AUTO: 10 FL (ref 7.4–10.4)
POCT GLUCOSE: 93 MG/DL (ref 70–110)
POTASSIUM SERPL-SCNC: 3.9 MMOL/L (ref 3.5–5.1)
PROT SERPL-MCNC: 6 GM/DL (ref 5.8–7.6)
RA PRESSURE ESTIMATED: 3 MMHG
RBC # BLD AUTO: 5.09 X10(6)/MCL (ref 4.2–5.4)
RIGHT CCA DIST DIAS: 17 CM/S
RIGHT CCA DIST SYS: 61 CM/S
RIGHT CCA PROX DIAS: 20 CM/S
RIGHT CCA PROX SYS: 83 CM/S
RIGHT ECA DIAS: 10 CM/S
RIGHT ECA SYS: 81 CM/S
RIGHT ICA DIST DIAS: 38 CM/S
RIGHT ICA DIST SYS: 98 CM/S
RIGHT ICA MID DIAS: 31 CM/S
RIGHT ICA MID SYS: 92 CM/S
RIGHT ICA PROX DIAS: 20 CM/S
RIGHT ICA PROX SYS: 77 CM/S
RIGHT VERTEBRAL DIAS: 10 CM/S
RIGHT VERTEBRAL SYS: 40 CM/S
RV TB RVSP: 5 MMHG
SINUS: 3.1 CM
SODIUM SERPL-SCNC: 142 MMOL/L (ref 136–145)
TDI LATERAL: 0.09 M/S
TDI SEPTAL: 0.09 M/S
TDI: 0.09 M/S
TR MAX PG: 18 MMHG
TRICUSPID ANNULAR PLANE SYSTOLIC EXCURSION: 1.83 CM
TV REST PULMONARY ARTERY PRESSURE: 21 MMHG
WBC # BLD AUTO: 9.98 X10(3)/MCL (ref 4.5–11.5)
Z-SCORE OF LEFT VENTRICULAR DIMENSION IN END DIASTOLE: -1.04
Z-SCORE OF LEFT VENTRICULAR DIMENSION IN END SYSTOLE: -0.42

## 2024-10-23 PROCEDURE — 97161 PT EVAL LOW COMPLEX 20 MIN: CPT

## 2024-10-23 PROCEDURE — 25500020 PHARM REV CODE 255: Performed by: INTERNAL MEDICINE

## 2024-10-23 PROCEDURE — 80053 COMPREHEN METABOLIC PANEL: CPT | Performed by: NURSE PRACTITIONER

## 2024-10-23 PROCEDURE — 25000003 PHARM REV CODE 250: Performed by: NURSE PRACTITIONER

## 2024-10-23 PROCEDURE — 85025 COMPLETE CBC W/AUTO DIFF WBC: CPT | Performed by: NURSE PRACTITIONER

## 2024-10-23 PROCEDURE — 97165 OT EVAL LOW COMPLEX 30 MIN: CPT

## 2024-10-23 PROCEDURE — 36415 COLL VENOUS BLD VENIPUNCTURE: CPT | Performed by: NURSE PRACTITIONER

## 2024-10-23 PROCEDURE — 83735 ASSAY OF MAGNESIUM: CPT | Performed by: NURSE PRACTITIONER

## 2024-10-23 PROCEDURE — 96360 HYDRATION IV INFUSION INIT: CPT

## 2024-10-23 PROCEDURE — G0378 HOSPITAL OBSERVATION PER HR: HCPCS

## 2024-10-23 PROCEDURE — 63600175 PHARM REV CODE 636 W HCPCS: Performed by: INTERNAL MEDICINE

## 2024-10-23 PROCEDURE — 84100 ASSAY OF PHOSPHORUS: CPT | Performed by: NURSE PRACTITIONER

## 2024-10-23 RX ADMIN — DILTIAZEM HYDROCHLORIDE 120 MG: 120 CAPSULE, COATED, EXTENDED RELEASE ORAL at 08:10

## 2024-10-23 RX ADMIN — PRAVASTATIN SODIUM 10 MG: 10 TABLET ORAL at 08:10

## 2024-10-23 RX ADMIN — LEVOTHYROXINE SODIUM 100 MCG: 100 TABLET ORAL at 05:10

## 2024-10-23 RX ADMIN — IOHEXOL 70 ML: 350 INJECTION, SOLUTION INTRAVENOUS at 05:10

## 2024-10-23 RX ADMIN — PANTOPRAZOLE SODIUM 40 MG: 40 TABLET, DELAYED RELEASE ORAL at 08:10

## 2024-10-23 RX ADMIN — SODIUM CHLORIDE, POTASSIUM CHLORIDE, SODIUM LACTATE AND CALCIUM CHLORIDE 500 ML: 600; 310; 30; 20 INJECTION, SOLUTION INTRAVENOUS at 11:10

## 2024-10-23 RX ADMIN — VENLAFAXINE HYDROCHLORIDE 75 MG: 37.5 CAPSULE, EXTENDED RELEASE ORAL at 08:10

## 2024-10-23 RX ADMIN — ASPIRIN 81 MG: 81 TABLET, COATED ORAL at 08:10

## 2024-10-23 NOTE — PLAN OF CARE
10/23/24 1500   Discharge Assessment   Assessment Type Discharge Planning Reassessment   Discharge Plan A Home;Home Health   Discharge Plan B Home   DME Needed Upon Discharge  none

## 2024-10-23 NOTE — PT/OT/SLP EVAL
Occupational Therapy   Evaluation and Discharge Note    Name: Jacklyn Appiah  MRN: [id number]    Recommendations:     Discharge therapy intensity: No Therapy Indicated   Discharge Equipment Recommendations: none  Barriers to discharge:  None    Assessment:     Jacklyn Appiah is a 69 y.o. female with a medical diagnosis of headache, ataxia, CVA workup. On eval, patient presents with independence in self care tasks and functional mobility at this time; no AD required. Skilled OT services are not warranted at this time.    Plan:     OT to sign off as acute OT services are not warranted at this time.  Please re-consult if situation changes during this hospitalization.    Plan of Care Reviewed with: patient    Subjective     Chief Complaint: none  Patient/Family Comments/goals: to go home    Occupational Profile:  Living Environment: lives alone in [facility], 3 SAUD (B rail); walk in shower (has shower chair PRN)  Previous level of function: independent  Roles and Routines: friend, employee  Equipment Used at Home: none  Assistance upon Discharge: multi friends    Pain/Comfort:  Pain Rating 1: 0/10    Patients cultural, spiritual, Sabianist conflicts given the current situation: no    Objective:     OT communicated with NSG prior to session.      Patient was found HOB elevated with peripheral IV upon OT entry to room.    General Precautions: Standard,    Orthopedic Precautions: N/A  Braces: N/A    Vital Signs: Respiratory Status: on room air    Bed Mobility:    Patient completed Supine to Sit with independence    Functional Mobility/Transfers:  Patient completed Sit <> Stand Transfer with independence  with  no assistive device   Patient completed Toilet Transfer Step Transfer technique with independence with  no AD  Functional Mobility: ambulating throughout room with no AD, independently    Activities of Daily Living:  Lower Body Dressing: independence to doff/don socks  Toileting: independence for anterior/posterior  perihygiene    [facility] 6 Click ADL:  AMPA Total Score: 24    Functional Cognition:  Intact    Visual Perceptual Skills:  Intact    Upper Extremity Function:  Right Upper Extremity:   WFL    Left Upper Extremity:  WFL    Balance:   Intact    Therapeutic Positioning  Risk for acquired pressure injuries is decreased due to intact sensation, continence, and ability to mobilize independently .    OT interventions performed during the course of today's session in an effort to prevent and/or reduce acquired pressure injuries:   Education was provided on benefits of and recommendations for therapeutic positioning    Skin assessment: all bony prominences were assessed    Findings: no redness or breakdown noted    OT recommendations for therapeutic positioning throughout hospitalization:   Follow [facility] Pressure Injury Prevention Protocol    Patient Education:  Patient provided with verbal education education regarding OT role/goals/POC, fall prevention, safety awareness, Discharge/DME recommendations, and pressure ulcer prevention.  Understanding was verbalized.     Patient left up in chair with all lines intact, call button in reach, and NSG notified.    History:     Past Medical History:   Diagnosis Date    Acute anal fissure     Adenocarcinoma of left lung     Anxiety disorder, unspecified     Mukherjee's esophagus     Bilateral stenosis of carotid arteries greater than 50%     Celiac disease     Chronic cerebral ischemia     Diabetes mellitus, type 2     Esophageal spasm     Family history of breast cancer in mother     GERD (gastroesophageal reflux disease)     Heart attack     Heart disease     History of cervical cancer     History of lobectomy of lung     History of TIA (transient ischemic attack)     Hyperlipidemia     Hypertension     Hypothyroidism, postsurgical     Irregular heart beat     Long-term use of high-risk medication     Lumbar radiculopathy     Lumbar spinal stenosis     Lung mass     Major depression in  complete remission     Migraine with aura     Osteopenia     Osteopenia after menopause     Painful defecation     Panic disorder (episodic paroxysmal anxiety)     Personal history of colonic polyps 02/10/2022    Rectal bleeding     Restless leg syndrome     Seizures     Spinal stenosis of lumbar region with radiculopathy     Statin-induced myositis     Stroke     Thyroid disease     Vitamin D deficiency          Past Surgical History:   Procedure Laterality Date    APPENDECTOMY  1992    CERVICAL BIOPSY  1979    CHOLECYSTECTOMY  1992    COLONOSCOPY  10/15/2018    Dr Leander Rizo    COLONOSCOPY W/ BIOPSIES AND POLYPECTOMY  02/10/2022    Dr Boo Friend    EGD, WITH BALLOON DILATION  12/21/2020    Dr Leander Rizo    ESOPHAGOGASTRODUODENOSCOPY  05/18/2016    Dr Leander Rizo    THORACOSCOPY  03/19/2021    Dr Frank Larsen    TONSILLECTOMY, ADENOIDECTOMY  1968    TOTAL ABDOMINAL HYSTERECTOMY  1988    TOTAL THYROIDECTOMY  05/2000    TOTAL THYROIDECTOMY  04/2000       Time Tracking:     OT Date of Treatment:    OT Start Time: 0819  OT Stop Time: 0827  OT Total Time (min): 8 min    Billable Minutes:Evaluation low    10/23/2024

## 2024-10-23 NOTE — H&P
[facility] Hospital Medicine - H&P Note    Patient Name: Jacklyn Appiah  : 1955  MRN: [id number]  PCP: Daniela Ward MD  Admitting Physician: Shy Garcia MD  Admission Class: IP- Inpatient   Length of Stay: 0  Face-to-Face encounter date: 10/22/2024  Code status: --Full    Chief Complaint   Headache and Gait Problem (C/o HA onset last night and gait issues onset this morning around 0800. States when she walks she feels like she's going towards the left. Denies unilateral extremity weakness/blurred vision/slurred speech/facial droop. Pt ambulatory with steady gait to triage. GCS 15. Denies blood thinners.)      History of Present Illness   Ms. Appiah is a 69 year old female with a pmh of CAD, HTN, HLD, T2DM, TIA/CVA, Hypothyroidism, Seizure disorder, RLS, Mukherjee's esophagus, cervical cancer, lung cancer anxiety/depression who presented to the ED with c/o headache starting yesterday and a feeling of being unbalanced when she woke up. Denies any focal weakness.  She states that she has a history of Vertigo, but her symptoms are not the same.    ED vitals on arrival:  Temp 97.9° F, pulse 93, resp 18, /74, SpO2 98% on RA.  Today's ED lab work was wholly unremarkable.  CT head negative for intracranial findings.  Neurology was consulted in the ED.  She was admitted to Hospital Medicine for management.      ROS   Except as documented, all other systems reviewed and negative     Past Medical History     CAD   Hypertension   Hyperlipidemia   Diabetes mellitus type 2   TIA/CVA  Hypothyroidism   Seizure disorder   RLS   Mukherjee's esophagus   Cervical cancer   Lung cancer   Anxiety/depression    Past Surgical History     Past Surgical History:   Procedure Laterality Date    APPENDECTOMY      CERVICAL BIOPSY  1979    CHOLECYSTECTOMY      COLONOSCOPY  10/15/2018    Dr Leander Rizo    COLONOSCOPY W/ BIOPSIES AND POLYPECTOMY  02/10/2022    Dr Boo Friend    EGD,  WITH BALLOON DILATION  12/21/2020    Dr Leander Rizo    ESOPHAGOGASTRODUODENOSCOPY  05/18/2016    Dr Leander Rizo    THORACOSCOPY  03/19/2021    Dr Frank Ortiz    TONSILLECTOMY, ADENOIDECTOMY  1968    TOTAL ABDOMINAL HYSTERECTOMY  1988    TOTAL THYROIDECTOMY  05/2000    TOTAL THYROIDECTOMY  04/2000       Social History     Screening for Social Drivers for health: Patient screened for food insecurity, housing instability, transportation needs, utility   difficulties, and interpersonal safety (select all that apply as identified as concern)  []Housing or Food  []Transportation Needs  []Utility Difficulties  []Interpersonal safety  [x]None    Social History     Tobacco Use    Smoking status: Former     Current packs/day: 0.00     Average packs/day: 1.5 packs/day for 15.0 years (22.5 ttl pk-yrs)     Types: Cigarettes     Start date: 3/18/2006     Quit date: 3/18/2021     Years since quitting: 3.6    Smokeless tobacco: Never   Substance Use Topics    Alcohol use: Never        Family History   Reviewed and negative    Allergies   Gluten    Home Medications     Prior to Admission medications    Medication Sig Start Date End Date Taking? Authorizing Provider   ALPRAZolam (XANAX) 0.5 MG tablet Take 1 tablet (0.5 mg total) by mouth nightly. 12/26/23 12/25/24 Yes Daniela Ward MD   aspirin (ECOTRIN) 81 MG EC tablet Take 81 mg by mouth once daily.   Yes Provider, Historical   diltiaZEM (CARDIZEM CD) 120 MG Cp24 Take 120 mg by mouth once daily. 4/12/22  Yes Provider, Historical   levothyroxine (SYNTHROID) 100 MCG tablet Take 1 tablet (100 mcg total) by mouth before breakfast. 10/18/22  Yes Carolina Wilcox MD   metFORMIN (GLUCOPHAGE) 1000 MG tablet Take 1 tablet (1,000 mg total) by mouth 2 (two) times daily with meals. 3/20/24 3/20/25 Yes Daniela Ward MD   metoprolol succinate (TOPROL-XL) 25 MG 24 hr tablet Take 12.5 mg by mouth nightly. 4/19/22  Yes Provider, Historical   pantoprazole (PROTONIX) 40  MG tablet Take 40 mg by mouth 2 (two) times daily. 6/10/22  Yes Provider, Historical   pravastatin (PRAVACHOL) 10 MG tablet TAKE 1 TABLET BY MOUTH EVERY DAY 1/22/24  Yes Daniela Ward MD   venlafaxine (EFFEXOR-XR) 75 MG 24 hr capsule TAKE 1 CAPSULE BY MOUTH EVERY DAY 5/16/24  Yes Daniela Ward MD   calcium/magnesium (CALCIUM AND MAGNESIUM ORAL)     Provider, Historical   ergocalciferol (ERGOCALCIFEROL) 50,000 unit Cap Take 50,000 Units by mouth every 7 days. 4/25/22   Provider, Historical   famotidine (PEPCID) 40 MG tablet Take 1 tablet by mouth every evening.    Provider, Historical   fexofenadine (ALLEGRA) 180 MG tablet Take 180 mg by mouth once daily.    Provider, Historical   ondansetron (ZOFRAN) 8 MG tablet Take 1 tablet (8 mg total) by mouth every 8 (eight) hours as needed for Nausea. 10/9/24   Daniela Ward MD   hyoscyamine (LEVSIN/SL) 0.125 mg Subl Place 1 tablet (0.125 mg total) under the tongue every 4 (four) hours as needed.  Patient not taking: Reported on 10/22/2024 4/11/23 10/22/24  Daniela Ward MD        Physical Exam   Vital Signs  Temp:  [97.3 °F (36.3 °C)-98.2 °F (36.8 °C)]   Pulse:  [64-93]   Resp:  [12-18]   BP: (118-151)/(74-86)   SpO2:  [98 %-100 %]    General: Well developed, well nourished. In no acute distress, non-toxic appearing  HEENT: NC/AT  Neck:  Supple. No JVD  Chest: Clear bilaterally, no wheezing, no accessory muscle use.  CVS: Regular rhythm. Normal S1/S2.  Abdomen: nondistended, normoactive BS, soft and non-tender.  MSK: No obvious deformity or joint swelling  Skin: Warm and dry  Neuro: AAOx3, no focal neurological deficit  Psych: Cooperative      Labs     Recent Labs     10/22/24  1234   WBC 9.42   RBC 5.83*   HGB 13.3   HCT 42.4   MCV 72.7*   MCH 22.8*   MCHC 31.4*   RDW 18.6*        Recent Labs     10/22/24  1234   INR 1.0      Recent Labs     10/22/24  1234      K 4.3   CO2 24   BUN 10.6   CREATININE 0.83   EGFRNORACEVR >60  "  GLUCOSE 101   CALCIUM 9.8   ALBUMIN 4.3   GLOBULIN 3.0   ALKPHOS 81   ALT 20   AST 16   BILITOT 0.3   TSH 0.335*     No results for input(s): "LACTIC" in the last 72 hours.  No results for input(s): "CPK", "TROPONINI" in the last 72 hours.     Microbiology Results (last 7 days)       ** No results found for the last 168 hours. **           Imaging     CT Head Without Contrast   Final Result      No acute intracranial findings identified.         Electronically signed by: Ketan Hernández   Date:    10/22/2024   Time:    13:42        Assessment & Plan   Ataxia, r/o CVA    History:  CAD, HTN, HLD, T2DM, TIA/CVA, Hypothyroidism, Seizure disorder, RLS, Mukherjee's esophagus, cervical cancer, lung cancer anxiety/depression    Plan:  -Neurology consulted - appreciate recommendations  -MRI brain w/o pending  -CTA w/wo head and neck pending  -Echo pending  -US NIVA bilateral carotids pending  -ST/PT/OT evaluation  -Q 4 hour neuro checks  -Permissive hypertension  -Telemetry monitoring  -O2 delivery as needed  -Sliding scale insulin with accu-checks  -Anti-hypertensives as needed  -Fall/Seizure/Aspiration precautions  -Resume home meds as appropriate  -Labs in AM      VTE Prophylaxis: Ulices       I, Claribel Sy, MILENA have reviewed and discussed this case with Dr. Garcia.  Please see addendum for further assessment and plan from attending MD.      "

## 2024-10-23 NOTE — DISCHARGE SUMMARY
[facility] Hospital Medicine Discharge Summary    Admit Date: 10/22/2024  Discharge Date and Time: 10/23/18535:37 PM  Admitting Physician: NAILA Team  Discharging Physician: Mariah Khan MD.  Primary Care Physician: Daniela Ward MD      Discharge Diagnoses:  Non intractable headache-resolved   Ataxic gait-resolved   Essential HTN-stable   History of TIA/CVA   History of type 2 diabetes mellitus    Hospital Course:   69-year-old female with significant history of HTN, type 2 diabetes mellitus, TIA, CVA without residual deficits, seizure disorder presented to the ED with complaints of unsteady gait, headache, patient was afebrile and hemodynamically stable in the ED, neuro exam within normal limits.  Patient admitted to hospital medicine services for stroke rule out.  MRI brain, CT angiogram came back negative.  Ultrasound carotid and echocardiogram also came back unremarkable except that she had less than 50% stenosis bilaterally, no more similar symptoms after admission, gait normal, headache resolved, patient hemodynamically and symptomatically stable.  Evaluated by therapy services, no therapy indicated long-term, since all the workup was negative and patient was back to self it was decided to discharge her home on 10/23, discharge medications per med rec, treatment plans discussed with the patient and she voiced understanding to everything explained  Vitals:  VITAL SIGNS: 24 HRS MIN & MAX LAST   Temp  Min: 97.3 °F (36.3 °C)  Max: 98 °F (36.7 °C) 98 °F (36.7 °C)   BP  Min: 93/59  Max: 151/86 109/64   Pulse  Min: 64  Max: 76  69   Resp  Min: 14  Max: 18 17   SpO2  Min: 93 %  Max: 100 % (!) 93 %       Physical Exam:  General appearance:  No acute distress  HENT: Atraumatic   Lungs: Clear to auscultation bilaterally.   Heart: RRR,No edema  Abdomen: Soft, non tender   Extremities: warm  Neuro:  Awake, alert, oriented x4  Psych/mental status: Appropriate mood and affect.       Procedures Performed: No admission procedures for hospital encounter.     Significant Diagnostic Studies: See Full reports for all details    Recent Labs   Lab 10/22/24  1234 10/23/24  0245   WBC 9.42 9.98   RBC 5.83* 5.09   HGB 13.3 11.5*   HCT 42.4 36.8*   MCV 72.7* 72.3*   MCH 22.8* 22.6*   MCHC 31.4* 31.3*   RDW 18.6* 17.4*    350   MPV 9.9 10.0       Recent Labs   Lab 10/22/24  1234 10/23/24  0245    142   K 4.3 3.9   * 108*   CO2 24 27   BUN 10.6 13.0   CREATININE 0.83 0.78   CALCIUM 9.8 9.0   MG  --  2.10   ALBUMIN 4.3 3.7   ALKPHOS 81 66   ALT 20 15   AST 16 13   BILITOT 0.3 0.3        Microbiology Results (last 7 days)       ** No results found for the last 168 hours. **             CV Ultrasound Bilateral Doppler Carotid  The right internal carotid artery is patent with less than 50% stenosis.   The left internal carotid artery is patent with less than 50% stenosis.   Bilateral vertebral arteries are patent with antegrade flow.  MRI Brain W WO Contrast  Narrative: EXAMINATION:  MRI BRAIN W WO CONTRAST    CLINICAL HISTORY:  Stroke, follow up;    TECHNIQUE:  Multiplanar MRI sequences were performed of the brain without and following administration gadolinium based contrast.    COMPARISON:  CT brain without contrast October 22, 2024.    FINDINGS:  Chronic microangiopathic ischemic changes are mild with subtle periventricular and few deep white matter T2 FLAIR hyperintense signals.  Chronic ischemia also involves the eboni.  Post contrast images are degraded by artifacts caused by the patient motion.  There is no definite pathologic intra-axial, leptomeningeal or dural enhancement.  There is mild cerebral cortical volume loss.  Gradient echo sequences demonstrate no evidence of de phasing artifact to suggest hemorrhagic byproducts. No evidence of diffusion restriction or ADC map signal drop out to suggest acute infarct. The sella and suprasellar areas are unremarkable.    The cerebellar  tonsils are normally positioned. There is no acute intracranial hemorrhage, hydrocephalus, midline shift or mass effect. No acute extra axial fluid collections identified. The mastoid air cells are clear.  Impression: 1.  No acute intracranial findings identified.    2.  Chronic microangiopathic ischemia and atrophy.    Electronically signed by: Ketan Hernández  Date:    10/23/2024  Time:    08:32  CTA Head and Neck (xpd)  Narrative: EXAMINATION:  CTA HEAD AND NECK (XPD)    CLINICAL HISTORY:  Stroke/TIA, determine embolic source;    TECHNIQUE:  Axial images obtained through the cervical region and [place] of [place] before and after the administration of intravenous contrast.    Coronal, sagittal, MIP and 3D reconstructions were obtained from the axial data.    Automatic exposure control was utilized to limit radiation dose.    Radiation Dose:    Total DLP: 2493 mGy*cm    COMPARISON:  CT head and MRI brain dated 10/22/2024    FINDINGS:  Head CT with contrast:    No interval changes when compared to the previous CT.    No enhancing abnormalities.    If present, stenosis of the carotid bulbs is measured based on NASCET criteria,    i.e. area of maximal stenosis compared to the cervical ICA distal to the bulb.    Cervical CTA:    The origins of the great vessels are patent with mild scattered calcifications.    The common carotid arteries are patent.  There are mild atherosclerotic changes at the carotid bulbs without hemodynamically evidence stenosis.  The internal carotid arteries are patent.    The vertebral arteries are patent.    Intracranial CTA:    There are minimal calcification along the course of the carotid siphons without hemodynamically significant stenosis.  The middle cerebral arteries and anterior arteries are patent.    The vertebral, basilar artery and posterior cerebral arteries are patent    The dural venous sinuses are patent.  Impression: No large vessel occlusion or flow-limiting  stenosis.    Electronically signed by: Silvana Humphreys  Date:    10/23/2024  Time:    08:21         Medication List        CONTINUE taking these medications      ALPRAZolam 0.5 MG tablet  Commonly known as: XANAX  Take 1 tablet (0.5 mg total) by mouth nightly.     aspirin 81 MG EC tablet  Commonly known as: ECOTRIN     CALCIUM AND MAGNESIUM ORAL     diltiaZEM 120 MG Cp24  Commonly known as: CARDIZEM CD     ergocalciferol 50,000 unit Cap  Commonly known as: ERGOCALCIFEROL     famotidine 40 MG tablet  Commonly known as: PEPCID     fexofenadine 180 MG tablet  Commonly known as: ALLEGRA     levothyroxine 100 MCG tablet  Commonly known as: SYNTHROID  Take 1 tablet (100 mcg total) by mouth before breakfast.     metFORMIN 1000 MG tablet  Commonly known as: GLUCOPHAGE  Take 1 tablet (1,000 mg total) by mouth 2 (two) times daily with meals.     metoprolol succinate 25 MG 24 hr tablet  Commonly known as: TOPROL-XL     ondansetron 8 MG tablet  Commonly known as: ZOFRAN  Take 1 tablet (8 mg total) by mouth every 8 (eight) hours as needed for Nausea.     pantoprazole 40 MG tablet  Commonly known as: PROTONIX     pravastatin 10 MG tablet  Commonly known as: PRAVACHOL  TAKE 1 TABLET BY MOUTH EVERY DAY     venlafaxine 75 MG 24 hr capsule  Commonly known as: EFFEXOR-XR  TAKE 1 CAPSULE BY MOUTH EVERY DAY               Explained in detail to the patient about the discharge plan, medications, and follow-up visits. Pt understands and agrees with the treatment plan  Discharge Disposition: Home or Self Care   Discharged Condition: stable  Diet-   Dietary Orders (From admission, onward)       Start     Ordered    10/22/24 2210  Diet Heart Healthy Diabetic; 2000 Calories (up to 75 gm per meal)  Diet effective now        Question Answer Comment   Diet Modifier: Diabetic    Total calories / carbs: 2000 Calories (up to 75 gm per meal)        10/22/24 2210                   Medications Per DC med rec  Activities as tolerated   Follow-up  Information       Daniela Ward MD Follow up in 1 week(s).    Specialties: Family Medicine, Urgent Care  Contact information:  [address]   [place] [id number] [phone]               Primary cardiologist in 1 week Follow up.                           For further questions contact hospitalist office    Discharge time 33 minutes    For worsening symptoms, chest pain, shortness of breath, increased abdominal pain, high grade fever, stroke or stroke like symptoms, immediately go to the nearest Emergency Room or call 911 as soon as possible.      Mariah Moeller M.D, on 10/23/2024. at 1:37 PM.

## 2024-10-23 NOTE — PLAN OF CARE
Problem: Adult Inpatient Plan of Care  Goal: Plan of Care Review  Outcome: Met  Goal: Patient-Specific Goal (Individualized)  Outcome: Met  Goal: Absence of Hospital-Acquired Illness or Injury  Outcome: Met  Goal: Optimal Comfort and Wellbeing  Outcome: Met  Goal: Readiness for Transition of Care  Outcome: Met     Problem: Diabetes Comorbidity  Goal: Blood Glucose Level Within Targeted Range  Outcome: Met     Problem: Stroke, Ischemic (Includes Transient Ischemic Attack)  Goal: Optimal Coping  Outcome: Met  Goal: Effective Bowel Elimination  Outcome: Met  Goal: Optimal Cerebral Tissue Perfusion  Outcome: Met  Goal: Optimal Cognitive Function  Outcome: Met  Goal: Improved Communication Skills  Outcome: Met  Goal: Optimal Functional Ability  Outcome: Met  Goal: Optimal Nutrition Intake  Outcome: Met  Goal: Effective Oxygenation and Ventilation  Outcome: Met  Goal: Improved Sensorimotor Function  Outcome: Met  Goal: Safe and Effective Swallow  Outcome: Met  Goal: Effective Urinary Elimination  Outcome: Met

## 2024-10-23 NOTE — PLAN OF CARE
Problem: Adult Inpatient Plan of Care  Goal: Plan of Care Review  Outcome: Progressing  Goal: Patient-Specific Goal (Individualized)  Outcome: Progressing  Goal: Absence of Hospital-Acquired Illness or Injury  Outcome: Progressing  Goal: Optimal Comfort and Wellbeing  Outcome: Progressing  Goal: Readiness for Transition of Care  Outcome: Progressing     Problem: Diabetes Comorbidity  Goal: Blood Glucose Level Within Targeted Range  Outcome: Progressing     Problem: Stroke, Ischemic (Includes Transient Ischemic Attack)  Goal: Optimal Coping  Outcome: Progressing  Goal: Effective Bowel Elimination  Outcome: Progressing  Goal: Optimal Cerebral Tissue Perfusion  Outcome: Progressing  Goal: Optimal Cognitive Function  Outcome: Progressing  Goal: Improved Communication Skills  Outcome: Progressing  Goal: Optimal Functional Ability  Outcome: Progressing  Goal: Optimal Nutrition Intake  Outcome: Progressing  Goal: Effective Oxygenation and Ventilation  Outcome: Progressing  Goal: Improved Sensorimotor Function  Outcome: Progressing  Goal: Safe and Effective Swallow  Outcome: Progressing  Goal: Effective Urinary Elimination  Outcome: Progressing

## 2024-10-23 NOTE — PT/OT/SLP EVAL
Physical Therapy Evaluation and Discharge Note    Patient Name:  Jacklyn Appiah   MRN:  [id number]    Recommendations:     Discharge therapy intensity: No Therapy Indicated   Discharge Equipment Recommendations: none   Barriers to discharge: None    Assessment:     Jacklyn Appiah is a 69 y.o. female admitted with a medical diagnosis of ataxia, CVA workup. .  At this time, patient is functioning at their prior level of function and does not require further acute PT services. No strength or gait deficits present on evaluation and pt feels back to her baseline.    Recent Surgery: * No surgery found *      Plan:     During this hospitalization, patient does not require further acute PT services.  Please re-consult if situation changes.      Subjective     Chief Complaint: none, symptoms resolved  Patient/Family Comments/goals: home  Pain/Comfort:  Pain Rating 1: 0/10    Patients cultural, spiritual, Sabianist conflicts given the current situation: no    Living Environment:  Pt lives alone in a [facility] with 3 SAUD with BHR.  Prior to admission, patients level of function was independent, working.  Equipment used at home: none.  DME owned (not currently used): none.  Upon discharge, patient will have assistance from friends, coworkers if needed.    Objective:     Communicated with RN prior to session.  Patient found HOB elevated with telemetry, pulse ox (continuous) upon PT entry to room.    General Precautions: Standard,      Orthopedic Precautions:    Braces:    Respiratory Status: Room air  Blood Pressure:     Exams:  RLE Strength: WNL  LLE Strength: WNL    Functional Mobility:  Bed Mobility:     Supine to Sit: independence  Transfers:     Sit to Stand:  independence with no AD  Gait: 1x15ft then [id number] with no AD with independence, no lateral veering or LOB observed  Balance: WNL    AM-PAC 6 CLICK MOBILITY  Total Score:24       Treatment and Education:      Patient provided with verbal education education regarding PT  role/goals/POC, fall prevention, safety awareness, and discharge/DME recommendations.  Understanding was verbalized.     Patient left up in chair with all lines intact, call button in reach, and RN notified.    GOALS:   Multidisciplinary Problems       Physical Therapy Goals       Not on file                    History:     Past Medical History:   Diagnosis Date    Acute anal fissure     Adenocarcinoma of left lung     Anxiety disorder, unspecified     Mukherjee's esophagus     Bilateral stenosis of carotid arteries greater than 50%     Celiac disease     Chronic cerebral ischemia     Diabetes mellitus, type 2     Esophageal spasm     Family history of breast cancer in mother     GERD (gastroesophageal reflux disease)     Heart attack     Heart disease     History of cervical cancer     History of lobectomy of lung     History of TIA (transient ischemic attack)     Hyperlipidemia     Hypertension     Hypothyroidism, postsurgical     Irregular heart beat     Long-term use of high-risk medication     Lumbar radiculopathy     Lumbar spinal stenosis     Lung mass     Major depression in complete remission     Migraine with aura     Osteopenia     Osteopenia after menopause     Painful defecation     Panic disorder (episodic paroxysmal anxiety)     Personal history of colonic polyps 02/10/2022    Rectal bleeding     Restless leg syndrome     Seizures     Spinal stenosis of lumbar region with radiculopathy     Statin-induced myositis     Stroke     Thyroid disease     Vitamin D deficiency        Past Surgical History:   Procedure Laterality Date    APPENDECTOMY  1992    CERVICAL BIOPSY  1979    CHOLECYSTECTOMY  1992    COLONOSCOPY  10/15/2018    Dr Leander Rizo    COLONOSCOPY W/ BIOPSIES AND POLYPECTOMY  02/10/2022    Dr Boo Friend    EGD, WITH BALLOON DILATION  12/21/2020    Dr Leander Rizo    ESOPHAGOGASTRODUODENOSCOPY  05/18/2016    Dr Leander Rzio    THORACOSCOPY  03/19/2021    Dr Frank Larsen     TONSILLECTOMY, ADENOIDECTOMY  1968    TOTAL ABDOMINAL HYSTERECTOMY  1988    TOTAL THYROIDECTOMY  05/2000    TOTAL THYROIDECTOMY  04/2000       Time Tracking:     PT Received On: 10/23/24  PT Start Time: 0819     PT Stop Time: 0827  PT Total Time (min): 8 min     Billable Minutes: Evaluation low complexity      10/23/2024

## 2024-10-23 NOTE — PLAN OF CARE
10/23/24 1257   Final Note   Assessment Type Discharge Planning Assessment   Anticipated Discharge Disposition Home   Hospital Resources/Appts/Education Provided Appointments scheduled and added to AVS   Post-Acute Status   Discharge Delays None known at this time     Pt's PCP is Dr Daniela Olivarez. Pt does not use any dme at home. Pt had HH services in the past. Pt lives alone in a mobile home with 3 steps to enter the home and rails along the steps. Pt's address is correct on the face sheet. Pt does drive and will drive her self home. Pt will dc today. She has no dc needs at this time

## 2024-10-24 ENCOUNTER — PATIENT OUTREACH (OUTPATIENT)
Dept: ADMINISTRATIVE | Facility: CLINIC | Age: 69
End: 2024-10-24
Payer: COMMERCIAL

## 2024-10-24 ENCOUNTER — TELEPHONE (OUTPATIENT)
Dept: FAMILY MEDICINE | Facility: CLINIC | Age: 69
End: 2024-10-24
Payer: COMMERCIAL

## 2024-10-24 NOTE — PROGRESS NOTES
C3 nurse spoke with Jacklyn Appiah  for a TCC post hospital discharge follow up call. The patient has a scheduled [facility] appointment with Daniela Ward MD (Family Medicine) Wednesday Oct 30, 2024 11:00 AM

## 2024-10-24 NOTE — TELEPHONE ENCOUNTER
Please schedul hospital discharge follow up within 1 week. I have slots that say hospital follow up. If you cannot find anything within one week, let me know

## 2024-10-25 ENCOUNTER — PATIENT MESSAGE (OUTPATIENT)
Dept: ADMINISTRATIVE | Facility: OTHER | Age: 69
End: 2024-10-25
Payer: COMMERCIAL

## 2024-10-28 ENCOUNTER — TELEPHONE (OUTPATIENT)
Dept: FAMILY MEDICINE | Facility: CLINIC | Age: 69
End: 2024-10-28
Payer: COMMERCIAL

## 2024-10-28 DIAGNOSIS — R06.02 SHORTNESS OF BREATH: Primary | ICD-10-CM

## 2024-10-29 ENCOUNTER — TELEPHONE (OUTPATIENT)
Dept: FAMILY MEDICINE | Facility: CLINIC | Age: 69
End: 2024-10-29

## 2024-10-29 ENCOUNTER — OFFICE VISIT (OUTPATIENT)
Dept: FAMILY MEDICINE | Facility: CLINIC | Age: 69
End: 2024-10-29
Payer: COMMERCIAL

## 2024-10-29 VITALS
RESPIRATION RATE: 18 BRPM | WEIGHT: 140.81 LBS | HEART RATE: 86 BPM | HEIGHT: 66 IN | BODY MASS INDEX: 22.63 KG/M2 | OXYGEN SATURATION: 99 % | TEMPERATURE: 98 F | SYSTOLIC BLOOD PRESSURE: 105 MMHG | DIASTOLIC BLOOD PRESSURE: 71 MMHG

## 2024-10-29 DIAGNOSIS — E78.5 HYPERLIPIDEMIA ASSOCIATED WITH TYPE 2 DIABETES MELLITUS: ICD-10-CM

## 2024-10-29 DIAGNOSIS — R06.02 SHORTNESS OF BREATH: ICD-10-CM

## 2024-10-29 DIAGNOSIS — I67.82 CHRONIC CEREBRAL ISCHEMIA: Chronic | ICD-10-CM

## 2024-10-29 DIAGNOSIS — Z86.73 HISTORY OF TRANSIENT ISCHEMIC ATTACK: Chronic | ICD-10-CM

## 2024-10-29 DIAGNOSIS — E11.59 HYPERTENSION ASSOCIATED WITH DIABETES: ICD-10-CM

## 2024-10-29 DIAGNOSIS — I65.23 BILATERAL CAROTID ARTERY STENOSIS: ICD-10-CM

## 2024-10-29 DIAGNOSIS — R27.0 ATAXIA: ICD-10-CM

## 2024-10-29 DIAGNOSIS — Z09 HOSPITAL DISCHARGE FOLLOW-UP: Primary | ICD-10-CM

## 2024-10-29 DIAGNOSIS — E11.69 HYPERLIPIDEMIA ASSOCIATED WITH TYPE 2 DIABETES MELLITUS: ICD-10-CM

## 2024-10-29 DIAGNOSIS — I15.2 HYPERTENSION ASSOCIATED WITH DIABETES: ICD-10-CM

## 2024-10-29 RX ORDER — ATORVASTATIN CALCIUM 40 MG/1
40 TABLET, FILM COATED ORAL DAILY
Qty: 90 TABLET | Refills: 3 | Status: SHIPPED | OUTPATIENT
Start: 2024-10-29 | End: 2025-10-29

## 2024-10-29 RX ORDER — FLUTICASONE PROPIONATE AND SALMETEROL 100; 50 UG/1; UG/1
1 POWDER RESPIRATORY (INHALATION) 2 TIMES DAILY
Qty: 60 EACH | Refills: 11 | Status: SHIPPED | OUTPATIENT
Start: 2024-10-29 | End: 2025-10-29

## 2024-11-06 ENCOUNTER — PROCEDURE VISIT (OUTPATIENT)
Dept: RESPIRATORY THERAPY | Facility: HOSPITAL | Age: 69
End: 2024-11-06
Attending: INTERNAL MEDICINE
Payer: COMMERCIAL

## 2024-11-06 VITALS — HEART RATE: 99 BPM | OXYGEN SATURATION: 99 % | RESPIRATION RATE: 21 BRPM

## 2024-11-06 DIAGNOSIS — R06.02 SHORTNESS OF BREATH: ICD-10-CM

## 2024-11-06 PROCEDURE — 94727 GAS DIL/WSHOT DETER LNG VOL: CPT

## 2024-11-06 PROCEDURE — 94760 N-INVAS EAR/PLS OXIMETRY 1: CPT

## 2024-11-06 PROCEDURE — 94729 DIFFUSING CAPACITY: CPT

## 2024-11-06 PROCEDURE — 94060 EVALUATION OF WHEEZING: CPT

## 2024-11-06 RX ORDER — ALBUTEROL SULFATE 0.83 MG/ML
2.5 SOLUTION RESPIRATORY (INHALATION)
Status: COMPLETED | OUTPATIENT
Start: 2024-11-06 | End: 2024-11-06

## 2024-11-06 RX ORDER — ALBUTEROL SULFATE 0.83 MG/ML
SOLUTION RESPIRATORY (INHALATION)
Status: DISPENSED
Start: 2024-11-06 | End: 2024-11-07

## 2024-11-06 RX ADMIN — ALBUTEROL SULFATE 2.5 MG: 0.83 SOLUTION RESPIRATORY (INHALATION) at 10:11

## 2024-11-12 ENCOUNTER — TELEPHONE (OUTPATIENT)
Dept: FAMILY MEDICINE | Facility: CLINIC | Age: 69
End: 2024-11-12
Payer: COMMERCIAL

## 2024-11-12 NOTE — TELEPHONE ENCOUNTER
----- Message from Irais sent at 11/12/2024  1:48 PM CST -----  Who Called: Jacklyn Appiah    Caller is requesting assistance/information from provider's office.    Symptoms (please be specific):    How long has patient had these symptoms:    List of preferred pharmacies on file (remove unneeded): [unfilled]  If different, enter pharmacy into here including location and phone number:         Patient's Preferred Phone Number on File: [phone]   Best Call Back Number, if different:  Additional Information: pt called to follow up on results from PFT , also would like to confirm results from Oncologist was received.

## 2024-11-12 NOTE — TELEPHONE ENCOUNTER
Spoke to pt and advised her that I see the PFT results in her chart but that it was Dr Norman that ordered the test not Dr Jacobs. I advised her that the results will have to come from his office. Verbal understanding given. I also let her know that I did not the records that she is asking about. She states that she was trying to fax them herself. She states that she will drop a copy of the records off to the office for Dr Jacobs to review.

## 2024-11-21 DIAGNOSIS — D47.2 GAMMOPATHY WITH MULTIPLE M SPIKES: Primary | ICD-10-CM

## 2024-12-12 ENCOUNTER — LAB VISIT (OUTPATIENT)
Dept: LAB | Facility: HOSPITAL | Age: 69
End: 2024-12-12
Attending: FAMILY MEDICINE
Payer: COMMERCIAL

## 2024-12-12 DIAGNOSIS — Z00.00 ENCOUNTER FOR WELLNESS EXAMINATION: ICD-10-CM

## 2024-12-12 DIAGNOSIS — R73.03 PREDIABETES: ICD-10-CM

## 2024-12-12 DIAGNOSIS — E78.2 MIXED HYPERLIPIDEMIA: ICD-10-CM

## 2024-12-12 DIAGNOSIS — I10 PRIMARY HYPERTENSION: Chronic | ICD-10-CM

## 2024-12-12 DIAGNOSIS — E11.9 TYPE 2 DIABETES MELLITUS WITHOUT COMPLICATION, WITHOUT LONG-TERM CURRENT USE OF INSULIN: ICD-10-CM

## 2024-12-12 DIAGNOSIS — E89.0 POSTOPERATIVE HYPOTHYROIDISM: ICD-10-CM

## 2024-12-12 LAB
ALBUMIN SERPL-MCNC: 4.2 G/DL (ref 3.4–4.8)
ALBUMIN/GLOB SERPL: 1.6 RATIO (ref 1.1–2)
ALP SERPL-CCNC: 70 UNIT/L (ref 40–150)
ALT SERPL-CCNC: 17 UNIT/L (ref 0–55)
ANION GAP SERPL CALC-SCNC: 8 MEQ/L
AST SERPL-CCNC: 15 UNIT/L (ref 5–34)
BASOPHILS # BLD AUTO: 0.07 X10(3)/MCL
BASOPHILS NFR BLD AUTO: 0.8 %
BILIRUB SERPL-MCNC: 0.2 MG/DL
BILIRUB UR QL STRIP.AUTO: NEGATIVE
BUN SERPL-MCNC: 14 MG/DL (ref 9.8–20.1)
CALCIUM SERPL-MCNC: 9.4 MG/DL (ref 8.4–10.2)
CHLORIDE SERPL-SCNC: 109 MMOL/L (ref 98–107)
CHOLEST SERPL-MCNC: 102 MG/DL
CHOLEST/HDLC SERPL: 3 {RATIO} (ref 0–5)
CLARITY UR: CLEAR
CO2 SERPL-SCNC: 27 MMOL/L (ref 23–31)
COLOR UR AUTO: YELLOW
CREAT SERPL-MCNC: 0.78 MG/DL (ref 0.55–1.02)
CREAT UR-MCNC: 109 MG/DL (ref 45–106)
CREAT/UREA NIT SERPL: 18
EOSINOPHIL # BLD AUTO: 0.45 X10(3)/MCL (ref 0–0.9)
EOSINOPHIL NFR BLD AUTO: 5.1 %
ERYTHROCYTE [DISTWIDTH] IN BLOOD BY AUTOMATED COUNT: 17.9 % (ref 11.5–17)
EST. AVERAGE GLUCOSE BLD GHB EST-MCNC: 111.2 MG/DL
GFR SERPLBLD CREATININE-BSD FMLA CKD-EPI: >60 ML/MIN/1.73/M2
GLOBULIN SER-MCNC: 2.6 GM/DL (ref 2.4–3.5)
GLUCOSE SERPL-MCNC: 102 MG/DL (ref 82–115)
GLUCOSE UR QL STRIP: NEGATIVE
HBA1C MFR BLD: 5.5 %
HCT VFR BLD AUTO: 42.4 % (ref 37–47)
HDLC SERPL-MCNC: 39 MG/DL (ref 35–60)
HGB BLD-MCNC: 12.8 G/DL (ref 12–16)
HGB UR QL STRIP: NEGATIVE
IMM GRANULOCYTES # BLD AUTO: 0.03 X10(3)/MCL (ref 0–0.04)
IMM GRANULOCYTES NFR BLD AUTO: 0.3 %
KETONES UR QL STRIP: NEGATIVE
LDLC SERPL CALC-MCNC: 45 MG/DL (ref 50–140)
LEUKOCYTE ESTERASE UR QL STRIP: NEGATIVE
LYMPHOCYTES # BLD AUTO: 2.72 X10(3)/MCL (ref 0.6–4.6)
LYMPHOCYTES NFR BLD AUTO: 31.1 %
MCH RBC QN AUTO: 22 PG (ref 27–31)
MCHC RBC AUTO-ENTMCNC: 30.2 G/DL (ref 33–36)
MCV RBC AUTO: 73 FL (ref 80–94)
MICROALBUMIN UR-MCNC: 6.7 UG/ML
MICROALBUMIN/CREAT RATIO PNL UR: 6.1 MG/GM CR (ref 0–30)
MONOCYTES # BLD AUTO: 0.54 X10(3)/MCL (ref 0.1–1.3)
MONOCYTES NFR BLD AUTO: 6.2 %
NEUTROPHILS # BLD AUTO: 4.93 X10(3)/MCL (ref 2.1–9.2)
NEUTROPHILS NFR BLD AUTO: 56.5 %
NITRITE UR QL STRIP: NEGATIVE
NRBC BLD AUTO-RTO: 0 %
PH UR STRIP: 6 [PH]
PLATELET # BLD AUTO: 336 X10(3)/MCL (ref 130–400)
PMV BLD AUTO: 10.4 FL (ref 7.4–10.4)
POTASSIUM SERPL-SCNC: 4.2 MMOL/L (ref 3.5–5.1)
PROT SERPL-MCNC: 6.8 GM/DL (ref 5.8–7.6)
PROT UR QL STRIP: NEGATIVE
RBC # BLD AUTO: 5.81 X10(6)/MCL (ref 4.2–5.4)
SODIUM SERPL-SCNC: 144 MMOL/L (ref 136–145)
SP GR UR STRIP.AUTO: 1.01 (ref 1–1.03)
TRIGL SERPL-MCNC: 89 MG/DL (ref 37–140)
TSH SERPL-ACNC: 0.1 UIU/ML (ref 0.35–4.94)
UROBILINOGEN UR STRIP-ACNC: 0.2
VLDLC SERPL CALC-MCNC: 18 MG/DL
WBC # BLD AUTO: 8.74 X10(3)/MCL (ref 4.5–11.5)

## 2024-12-12 PROCEDURE — 84443 ASSAY THYROID STIM HORMONE: CPT

## 2024-12-12 PROCEDURE — 82043 UR ALBUMIN QUANTITATIVE: CPT

## 2024-12-12 PROCEDURE — 36415 COLL VENOUS BLD VENIPUNCTURE: CPT

## 2024-12-12 PROCEDURE — 85025 COMPLETE CBC W/AUTO DIFF WBC: CPT

## 2024-12-12 PROCEDURE — 83036 HEMOGLOBIN GLYCOSYLATED A1C: CPT

## 2024-12-12 PROCEDURE — 80053 COMPREHEN METABOLIC PANEL: CPT

## 2024-12-12 PROCEDURE — 80061 LIPID PANEL: CPT

## 2024-12-12 PROCEDURE — 81003 URINALYSIS AUTO W/O SCOPE: CPT

## 2025-01-03 ENCOUNTER — TELEPHONE (OUTPATIENT)
Dept: HEMATOLOGY/ONCOLOGY | Facility: CLINIC | Age: 70
End: 2025-01-03
Payer: COMMERCIAL

## 2025-01-03 NOTE — TELEPHONE ENCOUNTER
Attempted to call patient regarding new patient appointment on 1/6/25 with Dr. Valiente, no answer, VM left.

## 2025-01-03 NOTE — PROGRESS NOTES
Subjective:       Patient ID: Jacklyn Appiah is a 69 y.o. female.    Chief Complaint: new patient (Patient has no concerns today)      Diagnosis:  History of adenocarcinoma of left lung stage pT2a, N0, M0 stage IB.   History of thyroid cancer  History of cervical cancer    Current Treatment:   Observation     Treatment History:  Resection left thoracotomy with left upper lobectomy by Dr. Larsen on 03/19/2021   Adjuvant Alimta and Carbo x 4 cycles from May to July 2021   Lobectomy in 2020  Hysterectomy in 2020    HPI:  69-year-old female who has a remote history of thyroid cancer in 2020 status post thyroidectomy along with cervical cancer in 2020 status post hysterectomy.  On 12/20/2020, she underwent low-dose CT scan of the chest for lung cancer screening.  This revealed a 2.2 cm left upper lobe mass.  She had a PET/CT scan on 01/06/2021 that revealed a spiculated left upper lobe nodule measuring up to 2.2 cm with an SUV of 7.9.  An MRI of the brain on 01/20/2021 showed no evidence of intracranial metastatic disease.  Patient then underwent a VATS wedge resection on 03/19/2021, frozen pathology revealed malignancy so patient underwent left upper lobectomy on 03/19/2021.  This revealed a 2.7 cm invasive adenocarcinoma, moderately differentiated invading the visceral pleura.  There were total of 8 lymph nodes removed, pathologic stage was pT2a, N0, M0 stage IB.  EGFR was negative.  The patient then treated with carboplatin and pemetrexed for 4 cycles every 3 weeks, from May 2021 through July 2021.  She was placed on surveillance.  She had a CT scan of the chest, abdomen, and pelvis on 07/12/2024 that showed no convincing evidence for recurrent disease.  On 10/14/2024, secondary to diarrhea, the patient had a CT scan of the abdomen and pelvis without contrast revealed no acute intra abdominal or pelvic solid organ or bowel pathology.  On 10/22/2024, CT scan of the head due to possible TIA/stroke showed no acute  findings.  MRI of the brain with and without contrast on 10/22/2024 showed no acute intracranial findings.  CT angiogram of the head on 10/23/2024 showed no large vessel occlusion or flow-limiting stenosis.  The patient wanted to transfer her care from [place], [place] to [place].  I initially saw her on 01/06/2025.  She stated that overall she was doing okay.  She had no major issues to discuss.  She stated that she just wanted to transfer her care.  She had no new symptoms.    Interval History:   Initial consult note.      Past Medical History:   Diagnosis Date    Acute anal fissure     Adenocarcinoma of left lung     Anxiety disorder, unspecified     Mukherjee's esophagus     Bilateral stenosis of carotid arteries greater than 50%     Celiac disease     Chronic cerebral ischemia     Diabetes mellitus, type 2     Esophageal spasm     Family history of breast cancer in mother     GERD (gastroesophageal reflux disease)     Heart attack     Heart disease     History of cervical cancer     History of lobectomy of lung     History of TIA (transient ischemic attack)     Hyperlipidemia     Hypertension     Hypothyroidism, postsurgical     Irregular heart beat     Long-term use of high-risk medication     Lumbar radiculopathy     Lumbar spinal stenosis     Lung mass     Major depression in complete remission     Migraine with aura     Osteopenia     Osteopenia after menopause     Painful defecation     Panic disorder (episodic paroxysmal anxiety)     Personal history of colonic polyps 02/10/2022    Rectal bleeding     Restless leg syndrome     Seizures     Spinal stenosis of lumbar region with radiculopathy     Statin-induced myositis     Stroke     Thyroid disease     Vitamin D deficiency       Past Surgical History:   Procedure Laterality Date    APPENDECTOMY  1992    CERVICAL BIOPSY  1979    CHOLECYSTECTOMY  1992    COLONOSCOPY  10/15/2018    Dr Leander Rizo    COLONOSCOPY W/ BIOPSIES AND POLYPECTOMY  02/10/2022     Dr Boo Friend    EGD, WITH BALLOON DILATION  12/21/2020    Dr Leander Rizo    ESOPHAGOGASTRODUODENOSCOPY  05/18/2016    Dr Leander Rizo    THORACOSCOPY  03/19/2021    Dr Frank Larsen    TONSILLECTOMY, ADENOIDECTOMY  1968    TOTAL ABDOMINAL HYSTERECTOMY  1988    TOTAL THYROIDECTOMY  05/2000    TOTAL THYROIDECTOMY  04/2000     Social History     Socioeconomic History    Marital status:    Tobacco Use    Smoking status: Former     Current packs/day: 0.00     Average packs/day: 1.5 packs/day for 15.0 years (22.5 ttl pk-yrs)     Types: Cigarettes     Start date: 3/18/2006     Quit date: 3/18/2021     Years since quitting: 3.8    Smokeless tobacco: Never   Substance and Sexual Activity    Alcohol use: Never    Drug use: Never    Sexual activity: Not Currently     Birth control/protection: Post-menopausal, See Surgical Hx     Social Drivers of Health     Financial Resource Strain: Low Risk  (10/29/2024)    Overall Financial Resource Strain (CARDIA)     Difficulty of Paying Living Expenses: Not hard at all   Food Insecurity: No Food Insecurity (10/29/2024)    Hunger Vital Sign     Worried About Running Out of Food in the Last Year: Never true     Ran Out of Food in the Last Year: Never true   Transportation Needs: No Transportation Needs (10/29/2024)    TRANSPORTATION NEEDS     Transportation : No   Physical Activity: Inactive (10/29/2024)    Exercise Vital Sign     Days of Exercise per Week: 0 days     Minutes of Exercise per Session: 0 min   Stress: Stress Concern Present (5/30/2024)    [employer] - Occupational Stress Questionnaire     Feeling of Stress : Rather much   Housing Stability: Low Risk  (10/29/2024)    Housing Stability Vital Sign     Unable to Pay for Housing in the Last Year: No     Homeless in the Last Year: No      Family History   Problem Relation Name Age of Onset    Breast cancer Mother Flaquita Pradhan     Diabetes Mellitus Mother Flaquita Pradhan     Heart  disease Mother Flaquita Pradhan     Hyperlipidemia Mother Flaquita Pradhan     Hypertension Mother Falquita Pradhan     Kidney disease Mother Flaquita Pradhan     Stroke Mother Flaquita Pradhan     COPD Mother Flaquita Pradhan     Depression Mother Flaquita Pradhan     Diabetes Mother Flaquita Pradhan     Mental illness Mother Flaquita Pradhan     Cancer Mother Flaquita Pradhan     Alzheimer's disease Father Alvin Pradhan Sr.     Arthritis Father Alvin Pradhan Sr.     Cancer Father Alvin Pradhan Sr.     Heart attack Brother Alvin Pradhan, .     Heart disease Brother Alvin Pradhan, .     Alcohol abuse Brother Alvin Pradhan, .     Heart disease Maternal Grandmother Unia Kiran     Lung cancer Maternal Grandfather      Breast cancer Paternal Grandmother Felitia Marguerite     Cancer Paternal Grandmother Felitinigel Marguerite       Review of patient's allergies indicates:   Allergen Reactions    Gluten Rash      Review of Systems   Constitutional:  Positive for unexpected weight change. Negative for appetite change.   HENT:  Negative for mouth sores.    Eyes:  Positive for visual disturbance.   Respiratory:  Positive for cough and shortness of breath.    Cardiovascular:  Negative for chest pain.   Gastrointestinal:  Positive for abdominal pain and diarrhea.   Genitourinary:  Positive for frequency.   Musculoskeletal:  Negative for back pain.   Integumentary:  Negative for rash.   Neurological:  Positive for headaches.   Hematological:  Negative for adenopathy.   Psychiatric/Behavioral:  The patient is nervous/anxious.          Objective:      Physical Exam  Vitals reviewed. Exam conducted with a chaperone present.   Constitutional:       General: She is not in acute distress.     Appearance: Normal appearance.   HENT:      Head: Normocephalic and atraumatic.      Nose: Nose normal.      Mouth/Throat:      Mouth: Mucous membranes are moist.   Eyes:      Extraocular Movements: Extraocular movements intact.      Conjunctiva/sclera: Conjunctivae normal.   Cardiovascular:       Rate and Rhythm: Normal rate and regular rhythm.      Pulses: Normal pulses.      Heart sounds: Normal heart sounds.   Pulmonary:      Effort: Pulmonary effort is normal.      Breath sounds: Normal breath sounds.   Abdominal:      General: Bowel sounds are normal.      Palpations: Abdomen is soft.   Musculoskeletal:         General: No swelling. Normal range of motion.      Cervical back: Normal range of motion and neck supple.      Right lower leg: No edema.      Left lower leg: No edema.   Lymphadenopathy:      Cervical: No cervical adenopathy.   Skin:     General: Skin is warm and dry.   Neurological:      General: No focal deficit present.      Mental Status: She is alert and oriented to person, place, and time. Mental status is at baseline.   Psychiatric:         Mood and Affect: Mood normal.         Behavior: Behavior normal.         LABS AND IMAGING REVIEWED IN EPIC          Assessment:   History of adenocarcinoma of left lung stage pT2a, N0, M0 stage IB.   History of thyroid cancer  History of cervical cancer        Plan:       Patient with a history of early stage lung cancer.  She also has a history of thyroid cancer and cervical cancer.  She had been on surveillance.  She wanted to transfer her care to [facility].  I saw her on 01/06/2025 for the 1st time.  She had no acute issues to discuss.    I explained that she is approximately 4 years out from her diagnosis, and I explained that we would plan to get scans on her in 3 months.  We would then plan to scan her again 6 months later.  Once she gets to the 5 year tio, we would then plan to move to annual scans.  Five years from completing her chemotherapy would be July 2026.    I will see her back in 3 months with CT chest, abdomen, and pelvis       Anirudh Valiente II, MD I, Miriam Toney LPN, acted solely as a scribe for and in the presence of, Dr. Anirudh Valiente who performed the service.

## 2025-01-06 ENCOUNTER — OFFICE VISIT (OUTPATIENT)
Dept: HEMATOLOGY/ONCOLOGY | Facility: CLINIC | Age: 70
End: 2025-01-06
Payer: COMMERCIAL

## 2025-01-06 VITALS
TEMPERATURE: 98 F | HEART RATE: 82 BPM | OXYGEN SATURATION: 99 % | BODY MASS INDEX: 21.21 KG/M2 | HEIGHT: 66 IN | SYSTOLIC BLOOD PRESSURE: 115 MMHG | DIASTOLIC BLOOD PRESSURE: 71 MMHG | WEIGHT: 132 LBS | RESPIRATION RATE: 18 BRPM

## 2025-01-06 DIAGNOSIS — Z85.850 HISTORY OF THYROID CANCER: ICD-10-CM

## 2025-01-06 DIAGNOSIS — D47.2 GAMMOPATHY WITH MULTIPLE M SPIKES: ICD-10-CM

## 2025-01-06 DIAGNOSIS — Z85.41 HISTORY OF CERVICAL CANCER: ICD-10-CM

## 2025-01-06 DIAGNOSIS — C34.12 MALIGNANT NEOPLASM OF UPPER LOBE OF LEFT LUNG: Primary | ICD-10-CM

## 2025-01-06 PROCEDURE — 99999 PR PBB SHADOW E&M-EST. PATIENT-LVL V: CPT | Mod: PBBFAC,,, | Performed by: INTERNAL MEDICINE

## 2025-01-10 ENCOUNTER — OFFICE VISIT (OUTPATIENT)
Dept: FAMILY MEDICINE | Facility: CLINIC | Age: 70
End: 2025-01-10
Payer: COMMERCIAL

## 2025-01-10 ENCOUNTER — LAB VISIT (OUTPATIENT)
Dept: LAB | Facility: HOSPITAL | Age: 70
End: 2025-01-10
Attending: INTERNAL MEDICINE
Payer: COMMERCIAL

## 2025-01-10 VITALS
WEIGHT: 140.63 LBS | OXYGEN SATURATION: 98 % | SYSTOLIC BLOOD PRESSURE: 121 MMHG | BODY MASS INDEX: 22.6 KG/M2 | TEMPERATURE: 98 F | DIASTOLIC BLOOD PRESSURE: 75 MMHG | HEART RATE: 83 BPM | HEIGHT: 66 IN | RESPIRATION RATE: 18 BRPM

## 2025-01-10 DIAGNOSIS — E55.9 VITAMIN D DEFICIENCY: ICD-10-CM

## 2025-01-10 DIAGNOSIS — E11.69 HYPERLIPIDEMIA ASSOCIATED WITH TYPE 2 DIABETES MELLITUS: ICD-10-CM

## 2025-01-10 DIAGNOSIS — F33.42 RECURRENT MAJOR DEPRESSIVE DISORDER, IN FULL REMISSION: Chronic | ICD-10-CM

## 2025-01-10 DIAGNOSIS — Z86.73 HISTORY OF TRANSIENT ISCHEMIC ATTACK: Chronic | ICD-10-CM

## 2025-01-10 DIAGNOSIS — Z85.41 HISTORY OF CERVICAL CANCER: ICD-10-CM

## 2025-01-10 DIAGNOSIS — Z23 NEED FOR INFLUENZA VACCINATION: ICD-10-CM

## 2025-01-10 DIAGNOSIS — Z12.31 ENCOUNTER FOR SCREENING MAMMOGRAM FOR BREAST CANCER: ICD-10-CM

## 2025-01-10 DIAGNOSIS — J44.9 CHRONIC OBSTRUCTIVE PULMONARY DISEASE, UNSPECIFIED COPD TYPE: ICD-10-CM

## 2025-01-10 DIAGNOSIS — Z85.850 HISTORY OF THYROID CANCER: ICD-10-CM

## 2025-01-10 DIAGNOSIS — D64.9 ANEMIA, UNSPECIFIED TYPE: ICD-10-CM

## 2025-01-10 DIAGNOSIS — M81.0 AGE-RELATED OSTEOPOROSIS WITHOUT CURRENT PATHOLOGICAL FRACTURE: Chronic | ICD-10-CM

## 2025-01-10 DIAGNOSIS — K90.0 CELIAC DISEASE: Chronic | ICD-10-CM

## 2025-01-10 DIAGNOSIS — Z23 NEED FOR VACCINATION FOR ZOSTER: ICD-10-CM

## 2025-01-10 DIAGNOSIS — F41.0 PANIC ATTACK: Chronic | ICD-10-CM

## 2025-01-10 DIAGNOSIS — E89.2 POSTPROCEDURAL HYPOPARATHYROIDISM: Chronic | ICD-10-CM

## 2025-01-10 DIAGNOSIS — Z86.0100 HISTORY OF COLONIC POLYPS: ICD-10-CM

## 2025-01-10 DIAGNOSIS — Z00.00 ENCOUNTER FOR WELLNESS EXAMINATION: Primary | ICD-10-CM

## 2025-01-10 DIAGNOSIS — E11.59 HYPERTENSION ASSOCIATED WITH DIABETES: ICD-10-CM

## 2025-01-10 DIAGNOSIS — I65.23 BILATERAL CAROTID ARTERY STENOSIS: ICD-10-CM

## 2025-01-10 DIAGNOSIS — I15.2 HYPERTENSION ASSOCIATED WITH DIABETES: ICD-10-CM

## 2025-01-10 DIAGNOSIS — H90.3 SENSORINEURAL HEARING LOSS (SNHL) OF BOTH EARS: ICD-10-CM

## 2025-01-10 DIAGNOSIS — J43.1 PANLOBULAR EMPHYSEMA: ICD-10-CM

## 2025-01-10 DIAGNOSIS — K22.70 BARRETT'S ESOPHAGUS WITHOUT DYSPLASIA: ICD-10-CM

## 2025-01-10 DIAGNOSIS — I67.82 CHRONIC CEREBRAL ISCHEMIA: Chronic | ICD-10-CM

## 2025-01-10 DIAGNOSIS — E78.5 HYPERLIPIDEMIA ASSOCIATED WITH TYPE 2 DIABETES MELLITUS: ICD-10-CM

## 2025-01-10 DIAGNOSIS — E89.0 POSTOPERATIVE HYPOTHYROIDISM: ICD-10-CM

## 2025-01-10 DIAGNOSIS — C34.12 MALIGNANT NEOPLASM OF UPPER LOBE OF LEFT LUNG: ICD-10-CM

## 2025-01-10 DIAGNOSIS — E11.9 TYPE 2 DIABETES MELLITUS WITHOUT COMPLICATION, WITHOUT LONG-TERM CURRENT USE OF INSULIN: ICD-10-CM

## 2025-01-10 LAB
CREAT SERPL-MCNC: 0.7 MG/DL (ref 0.55–1.02)
GFR SERPLBLD CREATININE-BSD FMLA CKD-EPI: >60 ML/MIN/1.73/M2

## 2025-01-10 PROCEDURE — 36415 COLL VENOUS BLD VENIPUNCTURE: CPT

## 2025-01-10 PROCEDURE — 82565 ASSAY OF CREATININE: CPT

## 2025-01-10 RX ORDER — METOPROLOL TARTRATE 25 MG/1
25 TABLET ORAL NIGHTLY
COMMUNITY

## 2025-01-10 RX ORDER — METFORMIN HYDROCHLORIDE 500 MG/1
500 TABLET ORAL 2 TIMES DAILY WITH MEALS
Qty: 180 TABLET | Refills: 3 | Status: SHIPPED | OUTPATIENT
Start: 2025-01-10 | End: 2026-01-10

## 2025-01-10 NOTE — PROGRESS NOTES
Patient ID: [id number]     Chief Complaint: Annual Exam (Wellness with lab results)        HPI:     Jacklyn Appiah is a 69 y.o. female here today for an annual wellness visit. Reviewed and discussed lab results. Overall she feels well. No other complaints today. Denies depression, si/hi, melena, hematochezia.         -------------------------------------    Acute anal fissure    Adenocarcinoma of left lung    Anxiety disorder, unspecified    Mukherjee's esophagus    Bilateral stenosis of carotid arteries greater than 50%    Celiac disease    Chronic cerebral ischemia    Diabetes mellitus, type 2    Esophageal spasm    Family history of breast cancer in mother    GERD (gastroesophageal reflux disease)    Heart attack    Heart disease    History of cervical cancer    History of lobectomy of lung    History of TIA (transient ischemic attack)    Hyperlipidemia    Hypertension    Hypothyroidism, postsurgical    Irregular heart beat    Long-term use of high-risk medication    Lumbar radiculopathy    Lumbar spinal stenosis    Lung mass    Major depression in complete remission    Migraine with aura    Osteopenia    Osteopenia after menopause    Painful defecation    Panic disorder (episodic paroxysmal anxiety)    Personal history of colonic polyps    Rectal bleeding    Restless leg syndrome    Seizures    Spinal stenosis of lumbar region with radiculopathy    Statin-induced myositis    Stroke    Thyroid disease    Vitamin D deficiency        Past Surgical History:   Procedure Laterality Date    APPENDECTOMY  1992    CERVICAL BIOPSY  1979    CHOLECYSTECTOMY  1992    COLONOSCOPY  10/15/2018    Dr Leander Rizo    COLONOSCOPY W/ BIOPSIES AND POLYPECTOMY  02/10/2022    Dr Boo Friend    EGD, WITH BALLOON DILATION  12/21/2020    Dr Leander Rizo    ESOPHAGOGASTRODUODENOSCOPY  05/18/2016    Dr Leander Rizo    THORACOSCOPY  03/19/2021    Dr Frank Larsen    TONSILLECTOMY, ADENOIDECTOMY  1968    TOTAL ABDOMINAL HYSTERECTOMY   1988    TOTAL THYROIDECTOMY  05/2000    TOTAL THYROIDECTOMY  04/2000       Review of patient's allergies indicates:   Allergen Reactions    Gluten Rash       Outpatient Medications Marked as Taking for the 1/10/25 encounter (Office Visit) with Daniela Ward MD   Medication Sig Dispense Refill    ALPRAZolam (XANAX) 0.5 MG tablet Take 1 tablet (0.5 mg total) by mouth nightly. 30 tablet 2    aspirin (ECOTRIN) 81 MG EC tablet Take 81 mg by mouth once daily.      atorvastatin (LIPITOR) 40 MG tablet Take 1 tablet (40 mg total) by mouth once daily. 90 tablet 3    calcium/magnesium (CALCIUM AND MAGNESIUM ORAL)       diltiaZEM (CARDIZEM CD) 120 MG Cp24 Take 120 mg by mouth once daily.      ergocalciferol (ERGOCALCIFEROL) 50,000 unit Cap Take 50,000 Units by mouth every 7 days.      famotidine (PEPCID) 40 MG tablet Take 1 tablet by mouth every evening.      fexofenadine (ALLEGRA) 180 MG tablet Take 180 mg by mouth once daily.      fluticasone-salmeterol diskus inhaler 100-50 mcg Inhale 1 puff into the lungs 2 (two) times daily. Controller 60 each 11    levothyroxine (SYNTHROID) 100 MCG tablet Take 1 tablet (100 mcg total) by mouth before breakfast. 30 tablet 11    lipase-protease-amylase (CREON) 36,000-114,000- 180,000 unit CpDR Take by mouth 3 (three) times daily.      metoprolol tartrate (LOPRESSOR) 12.5 mg tablet Take 25 mg by mouth every evening.      ondansetron (ZOFRAN) 8 MG tablet Take 1 tablet (8 mg total) by mouth every 8 (eight) hours as needed for Nausea. 30 tablet 1    pantoprazole (PROTONIX) 40 MG tablet Take 40 mg by mouth 2 (two) times daily.      venlafaxine (EFFEXOR-XR) 75 MG 24 hr capsule TAKE 1 CAPSULE BY MOUTH EVERY DAY 90 capsule 3    [DISCONTINUED] metFORMIN (GLUCOPHAGE) 1000 MG tablet Take 1 tablet (1,000 mg total) by mouth 2 (two) times daily with meals. 180 tablet 3     Current Facility-Administered Medications for the 1/10/25 encounter (Office Visit) with Daniela Ward MD    Medication Dose Route Frequency Provider Last Rate Last Admin    varicella zoster (Shingrix) IM vaccine (>/= 49 yo)  0.5 mL Intramuscular 1 time in Clinic/HOD            Social History     Socioeconomic History    Marital status:    Tobacco Use    Smoking status: Former     Current packs/day: 0.00     Average packs/day: 1.5 packs/day for 15.0 years (22.5 ttl pk-yrs)     Types: Cigarettes     Start date: 3/18/2006     Quit date: 3/18/2021     Years since quitting: 3.8    Smokeless tobacco: Never   Substance and Sexual Activity    Alcohol use: Never    Drug use: Never    Sexual activity: Not Currently     Birth control/protection: Post-menopausal, See Surgical Hx     Social Drivers of Health     Financial Resource Strain: Low Risk  (1/10/2025)    Overall Financial Resource Strain (CARDIA)     Difficulty of Paying Living Expenses: Not hard at all   Food Insecurity: No Food Insecurity (1/10/2025)    Hunger Vital Sign     Worried About Running Out of Food in the Last Year: Never true     Ran Out of Food in the Last Year: Never true   Transportation Needs: No Transportation Needs (1/10/2025)    TRANSPORTATION NEEDS     Transportation : No   Physical Activity: Inactive (1/10/2025)    Exercise Vital Sign     Days of Exercise per Week: 0 days     Minutes of Exercise per Session: 0 min   Stress: Stress Concern Present (5/30/2024)    [employer] - Occupational Stress Questionnaire     Feeling of Stress : Rather much   Housing Stability: Low Risk  (1/10/2025)    Housing Stability Vital Sign     Unable to Pay for Housing in the Last Year: No     Homeless in the Last Year: No        Family History   Problem Relation Name Age of Onset    Breast cancer Mother Flaquita Pradhan     Diabetes Mellitus Mother Flaquita Pradhan     Heart disease Mother Flaquita Pradhan     Hyperlipidemia Mother Flaquita Pradhan     Hypertension Mother Flaquita Pradhan     Kidney disease Mother Flaquita Pradhan     Stroke Mother Flaquita Pradhan      COPD Mother Flaquita Pradhan     Depression Mother Flaquita Pradhan     Diabetes Mother Flaquita Pradhan     Mental illness Mother Flaquita Pradhan     Cancer Mother Flaquita Pradhan     Alzheimer's disease Father Alvin Pradhan Sr.     Arthritis Father Alvin Pradhan Sr.     Cancer Father Alvin Pradhan Sr.     Heart attack Brother Alvin Pradhan Jr.     Heart disease Brother Alvin Pradhan Jr.     Alcohol abuse Brother Alvin Pradhan Jr.     Heart disease Maternal Grandmother Unia Kiran     Lung cancer Maternal Grandfather      Breast cancer Paternal Grandmother Herbert Everett     Cancer Paternal Grandmother Herbert Everett         Patient Care Team:  Daniela Ward MD as PCP - General (Family Medicine)  Leander Norman MD as Consulting Physician (Cardiology)  Carolina Wilcox MD (Inactive) as Consulting Physician (Hematology and Oncology)  Ernie Kitchen MD as Consulting Physician (Endocrinology)  Jena Larsen MD as Consulting Physician (Cardiothoracic Surgery)  Boo Friend MD as Consulting Physician (Gastroenterology)  Usha Felder MD as Consulting Physician (Medical Oncology)  Harry Ewing MD as Consulting Physician (Neurology)  Valorie Iqbal MD (Otolaryngology)  Abdoulaye Aviles MD (Ophthalmology)       Subjective:     ROS    Constitutional: Denies Change in appetite. Denies Chills. Denies Fever. Denies Night sweats.  Eye: Denies changes in vision  ENT: Denies Decreased hearing. Denies Sore throat. Denies Swollen glands.  Respiratory: Denies Cough. Denies Shortness of breath. Denies Shortness of breath with exertion. Denies Wheezing.  Cardiovascular: DeniesChest pain at rest. Denies Chest pain with exertion. Denies Irregular heartbeat. Denies Palpitations. Denies Edema.  Gastrointestinal: Denies Abdominal pain. DeniesDiarrhea. Denies Nausea. Denies Vomiting. Denies Hematemesis or Hematochezia.  Genitourinary: Denies Dysuria. Denies Urinary frequency. Denies Urinary urgency. Denies Blood in  "urine.  Endocrine: Denies Cold intolerance. Denies Excessive thirst. Denies Heat intolerance. Denies Weight loss. Denies Weight gain.  Musculoskeletal: Denies Painful joints. Denies Weakness.  Integumentary: Denies Rash. Denies Itching. Denies Dry skin.  Neurologic: Denies Dizziness. Denies Fainting. Denies Headache.  Psychiatric: Denies Depression. Denies Anxiety. Denies Suicidal/Homicidal ideations.    All Other ROS: Negative except as stated in HPI.       Objective:     /75 (BP Location: Left arm, Patient Position: Sitting)   Pulse 83   Temp 98.1 °F (36.7 °C) (Oral)   Resp 18   Ht 5' 6" (1.676 m)   Wt 63.8 kg (140 lb 9.6 oz)   SpO2 98%   BMI 22.69 kg/m²     Physical Exam    General: Alert and oriented, No acute distress.  Head: Normocephalic, Atraumatic.  Eye: Pupils are equal, round and reactive to light, Extraocular movements are intact, Sclera non-icteric.  Ears/Nose/Throat: TM+ light reflexes bilaterally. Normal, Mucosa moist,Clear. Teeth intact, no lesions of tongue, palate, mucosa.  Respiratory: Clear to auscultation bilaterally; No wheezes, rales or rhonchi, Non-labored respirations, Symmetrical chest wall expansion.  Cardiovascular: Regular rate and rhythm, S1/S2 normal, No murmurs, rubs or gallops.  Gastrointestinal: Soft, Non-tender, Non-distended, Normal bowel sounds, No palpable organomegaly.  Integumentary: Warm, Dry, Intact, No suspicious lesions or rashes.  Extremities: No clubbing, cyanosis or edema  Psychiatric: Normal interaction, Coherent speech, Euthymic mood, Appropriate affect     Protective Sensation (w/ 10 gram monofilament):  Right: Intact  Left: Intact    Visual Inspection:  Normal -  Bilateral    Pedal Pulses:   Right: Present  Left: Present    Posterior Tibialis Pulses:   Right:Present  Left: Present    Assessment:     Problem List Items Addressed This Visit       Bilateral carotid artery stenosis    Overview     Sees Dr. Norman. ON cardizem, aspirin and statin. " Asymptomatic    Continue current Rx meds           Celiac disease (Chronic)    Overview     Sees gi, stable. Not on any meds         Chronic cerebral ischemia (Chronic)    Overview     On lipitor 40 mg daily and aspirin. Hx of TIA    Continue current Rx meds             Mukherjee's esophagus    Overview     Sees gi. On PPI    Continue current Rx meds           History of transient ischemic attack (Chronic)    Overview     On pravastatin and aspirin.     D/c pravastatin  Start lipitor 40 mg daily           Hyperlipidemia associated with type 2 diabetes mellitus    Overview     On lipitor 40 mg daily. Ldl at goal      Continue current Rx meds             Relevant Medications    metFORMIN (GLUCOPHAGE) 500 MG tablet    Hypertension associated with diabetes    Overview     At goal on cardizem 120 mg daily and metoprolol 25 mg nightly. Asymptomatic    Continue current Rx meds           Relevant Medications    metFORMIN (GLUCOPHAGE) 500 MG tablet    Major depression in complete remission (Chronic)    Overview     Well controlled On effexor and xanax. Denies si/hi.     Continue current Rx meds           Postoperative hypothyroidism    Overview     s/p thyroidectomy for thyroid cancer, on synthroid.  She does see Dr. Diaz    Defer management to Dr. Kitchen             Panic attack (Chronic)    Overview     Well controlled with prn xanax.    Continue current Rx meds           Vitamin D deficiency    Overview     Resolved on weekly ergocalciferol    Continue current Rx meds           Age-related osteoporosis without current pathological fracture (Chronic)    Overview     Of lumbar spine. Unable to tolerate fosamax due to severe heartburn. On vitamin d3 and calcium.     Patient would like to try prolia. Will send in prolia         Type 2 diabetes mellitus without complication, without long-term current use of insulin    Overview     A1c down from 6.0 to 5.5. On metformin 1000 mg bid.     Adhere to a low carb/sugar diet that is also  low in fat, but high in protein. Eat foods such as baked chicken, turkey,     Decrease metformin to 500 mg bid  Rtc 6 mts with labs         Relevant Medications    metFORMIN (GLUCOPHAGE) 500 MG tablet    History of colonic polyps    Overview     Last scope 3/22. Repeat 3/27.          Sensorineural hearing loss (SNHL) of both ears    Postprocedural hypoparathyroidism (Chronic)    Overview     On calcium supplements. Calcium at goal     Continue current Rx meds           RESOLVED: Panlobular emphysema    COPD (chronic obstructive pulmonary disease)    Overview     Well controlled with advair and prn albuterol. She no longer smokes    Continue current Rx meds            Other Visit Diagnoses       Encounter for wellness examination    -  Primary    Anemia, unspecified type        Relevant Orders    Iron and TIBC    Ferritin    Need for vaccination for zoster        Relevant Medications    varicella zoster (Shingrix) IM vaccine (>/= 51 yo)    Encounter for screening mammogram for breast cancer        Relevant Orders    Mammo Digital Screening Bilat w/ Tereso            Plan:         Health Maintenance Topics with due status: Not Due       Topic Last Completion Date    TETANUS VACCINE 05/18/2020    Colorectal Cancer Screening 02/10/2022    DEXA Scan 11/10/2023    Mammogram 05/01/2024    Diabetes Urine Screening 12/12/2024    Lipid Panel 12/12/2024    Hemoglobin A1c 12/12/2024    High Dose Statin 01/10/2025    Aspirin/Antiplatelet Therapy 01/10/2025        Eye Exam - Recommend annually.    Dental Exam - Recommend biannual exams.     Vaccinations -   Immunization History   Administered Date(s) Administered    COVID-19 Vaccine 08/26/2021, 08/26/2021, 09/23/2021, 09/23/2021, 09/23/2021    COVID-19, MRNA, LN-S, PF (MODERNA FULL 0.5 ML DOSE) 08/26/2021, 09/23/2021    Influenza (FLUAD) - Quadrivalent - Adjuvanted - PF *Preferred* (65+) 12/20/2023    Influenza - Quadrivalent - High Dose - PF (65 years and older) 12/06/2022     Pneumococcal Conjugate - 20 Valent 12/20/2023    Tdap 05/18/2020      Patient was counseled on risks/benefits of receiving immunization. All questions were answered    Perform monthly self breast exams and call me immediately if you find a lump/bump or have any skin/nipple changes  Exercise for a total of 150 min per week and eat a healthy diet  Stay up to date with all cancer screening discussed in visit  Immunizations due were discussed during visit  All health maintenance was reviewed with patient. Patient verbalized understanding. All questions were answered.     Medication List with Changes/Refills   New Medications    METFORMIN (GLUCOPHAGE) 500 MG TABLET    Take 1 tablet (500 mg total) by mouth 2 (two) times daily with meals.       Start Date: 1/10/2025 End Date: 1/10/2026   Current Medications    ALPRAZOLAM (XANAX) 0.5 MG TABLET    Take 1 tablet (0.5 mg total) by mouth nightly.       Start Date: 12/26/2023End Date: 1/10/2025    ASPIRIN (ECOTRIN) 81 MG EC TABLET    Take 81 mg by mouth once daily.       Start Date: --        End Date: --    ATORVASTATIN (LIPITOR) 40 MG TABLET    Take 1 tablet (40 mg total) by mouth once daily.       Start Date: 10/29/2024End Date: 10/29/2025    CALCIUM/MAGNESIUM (CALCIUM AND MAGNESIUM ORAL)           Start Date: --        End Date: --    DILTIAZEM (CARDIZEM CD) 120 MG CP24    Take 120 mg by mouth once daily.       Start Date: 4/12/2022 End Date: --    ERGOCALCIFEROL (ERGOCALCIFEROL) 50,000 UNIT CAP    Take 50,000 Units by mouth every 7 days.       Start Date: 4/25/2022 End Date: --    FAMOTIDINE (PEPCID) 40 MG TABLET    Take 1 tablet by mouth every evening.       Start Date: --        End Date: --    FEXOFENADINE (ALLEGRA) 180 MG TABLET    Take 180 mg by mouth once daily.       Start Date: --        End Date: --    FLUTICASONE-SALMETEROL DISKUS INHALER 100-50 MCG    Inhale 1 puff into the lungs 2 (two) times daily. Controller       Start Date: 10/29/2024End Date: 10/29/2025     LEVOTHYROXINE (SYNTHROID) 100 MCG TABLET    Take 1 tablet (100 mcg total) by mouth before breakfast.       Start Date: 10/18/2022End Date: --    LIPASE-PROTEASE-AMYLASE (CREON) 36,000-114,000- 180,000 UNIT CPDR    Take by mouth 3 (three) times daily.       Start Date: 10/29/2024End Date: --    METOPROLOL TARTRATE (LOPRESSOR) 12.5 MG TABLET    Take 25 mg by mouth every evening.       Start Date: --        End Date: --    ONDANSETRON (ZOFRAN) 8 MG TABLET    Take 1 tablet (8 mg total) by mouth every 8 (eight) hours as needed for Nausea.       Start Date: 10/9/2024 End Date: --    PANTOPRAZOLE (PROTONIX) 40 MG TABLET    Take 40 mg by mouth 2 (two) times daily.       Start Date: 6/10/2022 End Date: --    VENLAFAXINE (EFFEXOR-XR) 75 MG 24 HR CAPSULE    TAKE 1 CAPSULE BY MOUTH EVERY DAY       Start Date: 5/16/2024 End Date: --   Discontinued Medications    METFORMIN (GLUCOPHAGE) 1000 MG TABLET    Take 1 tablet (1,000 mg total) by mouth 2 (two) times daily with meals.       Start Date: 3/20/2024 End Date: 1/10/2025          The patient's Health Maintenance was reviewed and the following appears to be due at this time:   Health Maintenance Due   Topic Date Due    Foot Exam  Never done    Shingles Vaccine (1 of 2) Never done    RSV Vaccine (Age 60+ and Pregnant patients) (1 - Risk 60-74 years 1-dose series) Never done    Influenza Vaccine (1) 09/01/2024    COVID-19 Vaccine (8 - 2024-25 season) 09/01/2024    Eye Exam  03/19/2025         Follow up for 6 mts diabetes with labs and one year wellness with labs. In addition to their scheduled follow up, the patient has also been instructed to follow up on as needed basis.

## 2025-01-14 ENCOUNTER — LAB VISIT (OUTPATIENT)
Dept: LAB | Facility: HOSPITAL | Age: 70
End: 2025-01-14
Attending: FAMILY MEDICINE
Payer: COMMERCIAL

## 2025-01-14 DIAGNOSIS — D64.9 ANEMIA, UNSPECIFIED TYPE: ICD-10-CM

## 2025-01-14 LAB
FERRITIN SERPL-MCNC: 11.71 NG/ML (ref 4.63–204)
IRON SATN MFR SERPL: 7 % (ref 20–50)
IRON SERPL-MCNC: 23 UG/DL (ref 50–170)
TIBC SERPL-MCNC: 321 UG/DL (ref 70–310)
TIBC SERPL-MCNC: 344 UG/DL (ref 250–450)
TRANSFERRIN SERPL-MCNC: 329 MG/DL (ref 173–360)

## 2025-01-14 PROCEDURE — 82728 ASSAY OF FERRITIN: CPT

## 2025-01-14 PROCEDURE — 83550 IRON BINDING TEST: CPT

## 2025-01-14 PROCEDURE — 36415 COLL VENOUS BLD VENIPUNCTURE: CPT

## 2025-01-15 ENCOUNTER — TELEPHONE (OUTPATIENT)
Dept: FAMILY MEDICINE | Facility: CLINIC | Age: 70
End: 2025-01-15
Payer: COMMERCIAL

## 2025-01-15 ENCOUNTER — PATIENT MESSAGE (OUTPATIENT)
Dept: FAMILY MEDICINE | Facility: CLINIC | Age: 70
End: 2025-01-15
Payer: COMMERCIAL

## 2025-01-15 DIAGNOSIS — E61.1 IRON DEFICIENCY: Primary | ICD-10-CM

## 2025-01-15 RX ORDER — FERROUS GLUCONATE 324(38)MG
324 TABLET ORAL
Qty: 90 TABLET | Refills: 3 | Status: SHIPPED | OUTPATIENT
Start: 2025-01-15 | End: 2026-01-15

## 2025-01-15 RX ORDER — HEPARIN 100 UNIT/ML
5 SYRINGE INTRAVENOUS
Status: CANCELLED | OUTPATIENT
Start: 2025-01-15

## 2025-01-15 RX ORDER — DIPHENHYDRAMINE HYDROCHLORIDE 50 MG/ML
50 INJECTION INTRAMUSCULAR; INTRAVENOUS ONCE AS NEEDED
Status: CANCELLED | OUTPATIENT
Start: 2025-01-15

## 2025-01-15 RX ORDER — EPINEPHRINE 0.3 MG/.3ML
0.3 INJECTION SUBCUTANEOUS ONCE AS NEEDED
Status: CANCELLED | OUTPATIENT
Start: 2025-01-15

## 2025-01-15 RX ORDER — SODIUM CHLORIDE 0.9 % (FLUSH) 0.9 %
10 SYRINGE (ML) INJECTION
Status: CANCELLED | OUTPATIENT
Start: 2025-01-15

## 2025-01-17 NOTE — TELEPHONE ENCOUNTER
Labs faxed to Dr Friend from the telephone message sent to me. Spoke to Marleen at the [facility]. They did get the orders and will be calling pt.

## 2025-01-28 ENCOUNTER — INFUSION (OUTPATIENT)
Dept: INFUSION THERAPY | Facility: HOSPITAL | Age: 70
End: 2025-01-28
Attending: FAMILY MEDICINE
Payer: COMMERCIAL

## 2025-01-28 VITALS
OXYGEN SATURATION: 98 % | SYSTOLIC BLOOD PRESSURE: 126 MMHG | TEMPERATURE: 98 F | HEART RATE: 74 BPM | DIASTOLIC BLOOD PRESSURE: 77 MMHG | RESPIRATION RATE: 17 BRPM

## 2025-01-28 DIAGNOSIS — E61.1 IRON DEFICIENCY: Primary | ICD-10-CM

## 2025-01-28 PROCEDURE — 96374 THER/PROPH/DIAG INJ IV PUSH: CPT

## 2025-01-28 PROCEDURE — 63600175 PHARM REV CODE 636 W HCPCS: Mod: JZ,TB | Performed by: FAMILY MEDICINE

## 2025-01-28 RX ORDER — SODIUM CHLORIDE 0.9 % (FLUSH) 0.9 %
10 SYRINGE (ML) INJECTION
OUTPATIENT
Start: 2025-02-04

## 2025-01-28 RX ORDER — DIPHENHYDRAMINE HYDROCHLORIDE 50 MG/ML
50 INJECTION INTRAMUSCULAR; INTRAVENOUS ONCE AS NEEDED
OUTPATIENT
Start: 2025-02-04

## 2025-01-28 RX ORDER — EPINEPHRINE 0.3 MG/.3ML
0.3 INJECTION SUBCUTANEOUS ONCE AS NEEDED
Status: DISCONTINUED | OUTPATIENT
Start: 2025-01-28 | End: 2025-01-28 | Stop reason: HOSPADM

## 2025-01-28 RX ORDER — HEPARIN 100 UNIT/ML
5 SYRINGE INTRAVENOUS
OUTPATIENT
Start: 2025-02-04

## 2025-01-28 RX ORDER — HEPARIN 100 UNIT/ML
5 SYRINGE INTRAVENOUS
Status: DISCONTINUED | OUTPATIENT
Start: 2025-01-28 | End: 2025-01-28 | Stop reason: HOSPADM

## 2025-01-28 RX ORDER — SODIUM CHLORIDE 0.9 % (FLUSH) 0.9 %
10 SYRINGE (ML) INJECTION
Status: DISCONTINUED | OUTPATIENT
Start: 2025-01-28 | End: 2025-01-28 | Stop reason: HOSPADM

## 2025-01-28 RX ORDER — DIPHENHYDRAMINE HYDROCHLORIDE 50 MG/ML
50 INJECTION INTRAMUSCULAR; INTRAVENOUS ONCE AS NEEDED
Status: DISCONTINUED | OUTPATIENT
Start: 2025-01-28 | End: 2025-01-28 | Stop reason: HOSPADM

## 2025-01-28 RX ORDER — EPINEPHRINE 0.3 MG/.3ML
0.3 INJECTION SUBCUTANEOUS ONCE AS NEEDED
OUTPATIENT
Start: 2025-02-04

## 2025-01-28 RX ADMIN — FERRIC CARBOXYMALTOSE INJECTION 750 MG: 50 INJECTION, SOLUTION INTRAVENOUS at 02:01

## 2025-01-28 NOTE — PLAN OF CARE
Administer ordered injectafer infusion.  Monitor patient for symptoms of adverse reaction to infusion.

## 2025-01-28 NOTE — PROGRESS NOTES
15:22 Patient denies symptoms of adverse reaction to IV injectafer. Provided patient with AVS.  Discharge instructions explained, questions answered.  Patient verbalized understanding and discharged in stable condition.

## 2025-02-04 ENCOUNTER — INFUSION (OUTPATIENT)
Dept: INFUSION THERAPY | Facility: HOSPITAL | Age: 70
End: 2025-02-04
Attending: FAMILY MEDICINE
Payer: COMMERCIAL

## 2025-02-04 VITALS
SYSTOLIC BLOOD PRESSURE: 105 MMHG | HEART RATE: 69 BPM | DIASTOLIC BLOOD PRESSURE: 71 MMHG | RESPIRATION RATE: 17 BRPM | OXYGEN SATURATION: 99 %

## 2025-02-04 DIAGNOSIS — E61.1 IRON DEFICIENCY: Primary | ICD-10-CM

## 2025-02-04 PROCEDURE — 96374 THER/PROPH/DIAG INJ IV PUSH: CPT

## 2025-02-04 PROCEDURE — 63600175 PHARM REV CODE 636 W HCPCS: Mod: JZ,TB | Performed by: FAMILY MEDICINE

## 2025-02-04 RX ORDER — EPINEPHRINE 0.3 MG/.3ML
0.3 INJECTION SUBCUTANEOUS ONCE AS NEEDED
OUTPATIENT
Start: 2025-02-04

## 2025-02-04 RX ORDER — HEPARIN 100 UNIT/ML
5 SYRINGE INTRAVENOUS
Status: CANCELLED | OUTPATIENT
Start: 2025-02-04

## 2025-02-04 RX ORDER — SODIUM CHLORIDE 0.9 % (FLUSH) 0.9 %
10 SYRINGE (ML) INJECTION
Status: DISCONTINUED | OUTPATIENT
Start: 2025-02-04 | End: 2025-02-04 | Stop reason: HOSPADM

## 2025-02-04 RX ORDER — DIPHENHYDRAMINE HYDROCHLORIDE 50 MG/ML
50 INJECTION INTRAMUSCULAR; INTRAVENOUS ONCE AS NEEDED
Status: DISCONTINUED | OUTPATIENT
Start: 2025-02-04 | End: 2025-02-04 | Stop reason: HOSPADM

## 2025-02-04 RX ORDER — HEPARIN 100 UNIT/ML
5 SYRINGE INTRAVENOUS
Status: DISCONTINUED | OUTPATIENT
Start: 2025-02-04 | End: 2025-02-04 | Stop reason: HOSPADM

## 2025-02-04 RX ORDER — SODIUM CHLORIDE 0.9 % (FLUSH) 0.9 %
10 SYRINGE (ML) INJECTION
Status: CANCELLED | OUTPATIENT
Start: 2025-02-04

## 2025-02-04 RX ORDER — DIPHENHYDRAMINE HYDROCHLORIDE 50 MG/ML
50 INJECTION INTRAMUSCULAR; INTRAVENOUS ONCE AS NEEDED
OUTPATIENT
Start: 2025-02-04

## 2025-02-04 RX ORDER — EPINEPHRINE 0.3 MG/.3ML
0.3 INJECTION SUBCUTANEOUS ONCE AS NEEDED
Status: DISCONTINUED | OUTPATIENT
Start: 2025-02-04 | End: 2025-02-04 | Stop reason: HOSPADM

## 2025-02-04 RX ADMIN — FERRIC CARBOXYMALTOSE INJECTION 750 MG: 50 INJECTION, SOLUTION INTRAVENOUS at 02:02

## 2025-02-04 NOTE — PROGRESS NOTES
15:11 Patient denies symptoms of adverse reaction to injectafer. Provided patient with AVS.  Discharge instructions explained, questions answered.  Patient verbalized understanding and discharged in stable condition.

## 2025-02-14 ENCOUNTER — INFUSION (OUTPATIENT)
Dept: INFUSION THERAPY | Facility: HOSPITAL | Age: 70
End: 2025-02-14
Attending: FAMILY MEDICINE
Payer: COMMERCIAL

## 2025-02-14 VITALS
RESPIRATION RATE: 17 BRPM | SYSTOLIC BLOOD PRESSURE: 101 MMHG | OXYGEN SATURATION: 99 % | HEART RATE: 68 BPM | DIASTOLIC BLOOD PRESSURE: 67 MMHG

## 2025-02-14 DIAGNOSIS — M81.0 AGE-RELATED OSTEOPOROSIS WITHOUT CURRENT PATHOLOGICAL FRACTURE: Primary | ICD-10-CM

## 2025-02-14 PROCEDURE — 63600175 PHARM REV CODE 636 W HCPCS: Mod: JZ,TB | Performed by: FAMILY MEDICINE

## 2025-02-14 PROCEDURE — 96372 THER/PROPH/DIAG INJ SC/IM: CPT

## 2025-02-14 RX ADMIN — DENOSUMAB 60 MG: 60 INJECTION SUBCUTANEOUS at 01:02

## 2025-02-14 NOTE — PROGRESS NOTES
13:00 Provided patient with a copy of education regarding Prolia.  Nurse discussed medication, including the risks of hypocalcemia, osteonecrosis of the jaw, atypical femoral fractures serious infections, and dermatologic reactions associated with Prolia.  Also, explained that the patient's provider has determined that the benefits of Prolia outweigh the risks of her receiving it. Patient verbalized understanding.

## 2025-02-14 NOTE — PROGRESS NOTES
13:24 Patient denies symptoms of adverse reaction to Prolia injection. Provided patient with AVS.  Discharge instructions explained, questions answered.  Patient verbalized understanding and discharged in stable condition.

## 2025-02-14 NOTE — PLAN OF CARE
Keep patient's personal belongings within reach.  Administer ordered prolia injection.  Monitor patient for symptoms of adverse reactions to injection.

## 2025-04-04 ENCOUNTER — HOSPITAL ENCOUNTER (OUTPATIENT)
Dept: RADIOLOGY | Facility: HOSPITAL | Age: 70
Discharge: HOME | End: 2025-04-04
Attending: INTERNAL MEDICINE
Payer: COMMERCIAL

## 2025-04-04 DIAGNOSIS — Z03.89 OBSERVATION FOR SUSPECTED CANCER: Primary | ICD-10-CM

## 2025-04-04 DIAGNOSIS — C34.12 MALIGNANT NEOPLASM OF UPPER LOBE OF LEFT LUNG: ICD-10-CM

## 2025-04-04 DIAGNOSIS — Z85.41 HISTORY OF CERVICAL CANCER: ICD-10-CM

## 2025-04-04 DIAGNOSIS — Z85.850 HISTORY OF THYROID CANCER: ICD-10-CM

## 2025-04-04 PROCEDURE — 74177 CT ABD & PELVIS W/CONTRAST: CPT | Mod: TC

## 2025-04-04 PROCEDURE — 25500020 PHARM REV CODE 255: Performed by: INTERNAL MEDICINE

## 2025-04-04 RX ORDER — IOPAMIDOL 755 MG/ML
100 INJECTION, SOLUTION INTRAVASCULAR
Status: COMPLETED | OUTPATIENT
Start: 2025-04-04 | End: 2025-04-04

## 2025-04-04 RX ORDER — DIATRIZOATE MEGLUMINE AND DIATRIZOATE SODIUM 660; 100 MG/ML; MG/ML
30 SOLUTION ORAL; RECTAL
Status: COMPLETED | OUTPATIENT
Start: 2025-04-04 | End: 2025-04-04

## 2025-04-04 RX ADMIN — DIATRIZOATE MEGLUMINE AND DIATRIZOATE SODIUM 30 ML: 660; 100 LIQUID ORAL; RECTAL at 09:04

## 2025-04-04 RX ADMIN — IOPAMIDOL 100 ML: 755 INJECTION, SOLUTION INTRAVENOUS at 09:04

## 2025-04-09 LAB
LEFT EYE DM RETINOPATHY: NEGATIVE
RIGHT EYE DM RETINOPATHY: NEGATIVE

## 2025-04-09 NOTE — PROGRESS NOTES
Subjective:       Patient ID: Jacklyn Appiah is a 70 y.o. female.    Chief Complaint: 3 month follow with review of scans      Diagnosis:  History of adenocarcinoma of left lung stage pT2a, N0, M0 stage IB.   History of thyroid cancer  History of cervical cancer    Current Treatment:   Observation     Treatment History:  Resection left thoracotomy with left upper lobectomy by Dr. Larsen on 03/19/2021   Adjuvant Alimta and Carbo x 4 cycles from May to July 2021   Lobectomy in 2020  Hysterectomy in 2020    HPI:  69-year-old female who has a remote history of thyroid cancer in 2020 status post thyroidectomy along with cervical cancer in 2020 status post hysterectomy.  On 12/20/2020, she underwent low-dose CT scan of the chest for lung cancer screening.  This revealed a 2.2 cm left upper lobe mass.  She had a PET/CT scan on 01/06/2021 that revealed a spiculated left upper lobe nodule measuring up to 2.2 cm with an SUV of 7.9.  An MRI of the brain on 01/20/2021 showed no evidence of intracranial metastatic disease.  Patient then underwent a VATS wedge resection on 03/19/2021, frozen pathology revealed malignancy so patient underwent left upper lobectomy on 03/19/2021.  This revealed a 2.7 cm invasive adenocarcinoma, moderately differentiated invading the visceral pleura.  There were total of 8 lymph nodes removed, pathologic stage was pT2a, N0, M0 stage IB.  EGFR was negative.  The patient then treated with carboplatin and pemetrexed for 4 cycles every 3 weeks, from May 2021 through July 2021.  She was placed on surveillance.  She had a CT scan of the chest, abdomen, and pelvis on 07/12/2024 that showed no convincing evidence for recurrent disease.  On 10/14/2024, secondary to diarrhea, the patient had a CT scan of the abdomen and pelvis without contrast revealed no acute intra abdominal or pelvic solid organ or bowel pathology.  On 10/22/2024, CT scan of the head due to possible TIA/stroke showed no acute findings.   MRI of the brain with and without contrast on 10/22/2024 showed no acute intracranial findings.  CT angiogram of the head on 10/23/2024 showed no large vessel occlusion or flow-limiting stenosis.  The patient wanted to transfer her care from [place], [place] to [place].  I initially saw her on 01/06/2025.  She stated that overall she was doing okay.  She had no major issues to discuss.  She stated that she just wanted to transfer her care.  She had no new symptoms.  Most recent scans done on 04/04/2025 showed no evidence of disease with a stable left adrenal gland nodule.    Interval History:   Patient presents for follow up visit.  She continues to have iron-deficiency and gets IV iron with her primary care physician.  She does state that she still has some fatigue.  She is addressing this with the primary care physician.  Otherwise, she is happy with the scan results.    Past Medical History:   Diagnosis Date    Acute anal fissure     Adenocarcinoma of left lung     Anxiety disorder, unspecified     Mukherjee's esophagus     Bilateral stenosis of carotid arteries greater than 50%     Celiac disease     Chronic cerebral ischemia     Diabetes mellitus, type 2     Esophageal spasm     Family history of breast cancer in mother     GERD (gastroesophageal reflux disease)     Heart attack     Heart disease     History of cervical cancer     History of lobectomy of lung     History of TIA (transient ischemic attack)     Hyperlipidemia     Hypertension     Hypothyroidism, postsurgical     Irregular heart beat     Long-term use of high-risk medication     Lumbar radiculopathy     Lumbar spinal stenosis     Lung mass     Major depression in complete remission     Migraine with aura     Osteopenia     Osteopenia after menopause     Painful defecation     Panic disorder (episodic paroxysmal anxiety)     Personal history of colonic polyps 02/10/2022    Rectal bleeding     Restless leg syndrome     Seizures     Spinal  stenosis of lumbar region with radiculopathy     Statin-induced myositis     Stroke     Thyroid disease     Vitamin D deficiency       Past Surgical History:   Procedure Laterality Date    APPENDECTOMY  1992    CERVICAL BIOPSY  1979    CHOLECYSTECTOMY  1992    COLONOSCOPY  10/15/2018    Dr Leander Rizo    COLONOSCOPY W/ BIOPSIES AND POLYPECTOMY  02/10/2022    Dr Boo Friend    EGD, WITH BALLOON DILATION  2020    Dr Leander Rizo    ESOPHAGOGASTRODUODENOSCOPY  2016    Dr Leander Rizo    THORACOSCOPY  2021    Dr Marie Allam    TONSILLECTOMY, ADENOIDECTOMY  1968    TOTAL ABDOMINAL HYSTERECTOMY      TOTAL THYROIDECTOMY  2000    TOTAL THYROIDECTOMY  2000     Social History     Socioeconomic History    Marital status:    Tobacco Use    Smoking status: Former     Current packs/day: 0.00     Average packs/day: 1.5 packs/day for 15.0 years (22.5 ttl pk-yrs)     Types: Cigarettes     Start date: 3/18/2006     Quit date: 3/18/2021     Years since quittin.0    Smokeless tobacco: Never   Substance and Sexual Activity    Alcohol use: Never    Drug use: Never    Sexual activity: Not Currently     Birth control/protection: Post-menopausal, See Surgical Hx     Social Drivers of Health     Financial Resource Strain: Low Risk  (1/10/2025)    Overall Financial Resource Strain (CARDIA)     Difficulty of Paying Living Expenses: Not hard at all   Food Insecurity: No Food Insecurity (1/10/2025)    Hunger Vital Sign     Worried About Running Out of Food in the Last Year: Never true     Ran Out of Food in the Last Year: Never true   Transportation Needs: No Transportation Needs (1/10/2025)    TRANSPORTATION NEEDS     Transportation : No   Physical Activity: Inactive (1/10/2025)    Exercise Vital Sign     Days of Exercise per Week: 0 days     Minutes of Exercise per Session: 0 min   Stress: Stress Concern Present (2024)    [employer] - Occupational Stress  Questionnaire     Feeling of Stress : Rather much   Housing Stability: Unknown (1/10/2025)    Housing Stability Vital Sign     Unable to Pay for Housing in the Last Year: No     Homeless in the Last Year: No      Family History   Problem Relation Name Age of Onset    Breast cancer Mother Flaquita Pradhan     Diabetes Mellitus Mother Flaquita Pradhan     Heart disease Mother Flaquita Pradhan     Hyperlipidemia Mother Flaquita Pradhan     Hypertension Mother Flaquita Pradhan     Kidney disease Mother Flaquita Pradhan     Stroke Mother Flaquita Pradhan     COPD Mother Flaquita Pradhan     Depression Mother Flaquita Pradhan     Diabetes Mother Flaquita Pradhan     Mental illness Mother Flaquita Pradhan     Cancer Mother Flaquita Pradhan     Alzheimer's disease Father Alvin Pradhan Sr.     Arthritis Father Alvin Pradhan Sr.     Cancer Father Alvin Pradhan Sr.     Heart attack Brother Alvin Pradhan Jr.     Heart disease Brother Alvin Zachryland, Jr.     Alcohol abuse Brother Alvin Pradhan, Jr.     Heart disease Maternal Grandmother Unia Kiran     Lung cancer Maternal Grandfather      Breast cancer Paternal Grandmother Felitia Marguerite     Cancer Paternal Grandmother Felitia Marguerite       Review of patient's allergies indicates:   Allergen Reactions    Gluten Rash      Review of Systems   Constitutional:  Positive for unexpected weight change. Negative for appetite change.   HENT:  Negative for mouth sores.    Eyes:  Positive for visual disturbance.   Respiratory:  Positive for cough and shortness of breath.    Cardiovascular:  Negative for chest pain.   Gastrointestinal:  Positive for abdominal pain and diarrhea.   Genitourinary:  Positive for frequency.   Musculoskeletal:  Negative for back pain.   Integumentary:  Negative for rash.   Neurological:  Positive for headaches.   Hematological:  Negative for adenopathy.   Psychiatric/Behavioral:  The patient is nervous/anxious.          Objective:      Physical Exam  Vitals reviewed. Exam conducted with a chaperone present.    Constitutional:       General: She is not in acute distress.     Appearance: Normal appearance.   HENT:      Head: Normocephalic and atraumatic.      Nose: Nose normal.      Mouth/Throat:      Mouth: Mucous membranes are moist.   Eyes:      Extraocular Movements: Extraocular movements intact.      Conjunctiva/sclera: Conjunctivae normal.   Cardiovascular:      Rate and Rhythm: Normal rate and regular rhythm.      Pulses: Normal pulses.      Heart sounds: Normal heart sounds.   Pulmonary:      Effort: Pulmonary effort is normal.      Breath sounds: Normal breath sounds.   Abdominal:      General: Bowel sounds are normal.      Palpations: Abdomen is soft.   Musculoskeletal:         General: No swelling. Normal range of motion.      Cervical back: Normal range of motion and neck supple.      Right lower leg: No edema.      Left lower leg: No edema.   Lymphadenopathy:      Cervical: No cervical adenopathy.   Skin:     General: Skin is warm and dry.   Neurological:      General: No focal deficit present.      Mental Status: She is alert and oriented to person, place, and time. Mental status is at baseline.   Psychiatric:         Mood and Affect: Mood normal.         Behavior: Behavior normal.         LABS AND IMAGING REVIEWED IN EPIC          Assessment:   History of adenocarcinoma of left lung stage pT2a, N0, M0 stage IB.   History of thyroid cancer  History of cervical cancer        Plan:       Patient with a history of early stage lung cancer.  She also has a history of thyroid cancer and cervical cancer.  She had been on surveillance.  She wanted to transfer her care to [place].  I saw her on 01/06/2025 for the 1st time.  She had no acute issues to discuss.    Once she gets to the 5 year tio, we would then plan to move to annual scans.  Five years from completing her chemotherapy would be July 2026.    CT scan done on 04/04/2025 showed no evidence of disease.    I will see her back in 6 months with CT chest,  abdomen, and pelvis     Visit today included increased complexity associated with the care of the episodic problem multiple malignancies including thyroid, cervical, lung, addressing and managing the longitudinal care of the patient's multiple malignancies including thyroid, cervical, lung.       Anirudh Valiente II, MD I, Miriam Toney LPN, acted solely as a scribe for and in the presence of, Dr. Anirudh Valiente who performed the service.

## 2025-04-10 ENCOUNTER — OFFICE VISIT (OUTPATIENT)
Dept: HEMATOLOGY/ONCOLOGY | Facility: CLINIC | Age: 70
End: 2025-04-10
Payer: COMMERCIAL

## 2025-04-10 VITALS
HEART RATE: 72 BPM | TEMPERATURE: 98 F | RESPIRATION RATE: 18 BRPM | WEIGHT: 134.94 LBS | OXYGEN SATURATION: 99 % | DIASTOLIC BLOOD PRESSURE: 70 MMHG | HEIGHT: 66 IN | BODY MASS INDEX: 21.69 KG/M2 | SYSTOLIC BLOOD PRESSURE: 109 MMHG

## 2025-04-10 DIAGNOSIS — C34.12 MALIGNANT NEOPLASM OF UPPER LOBE OF LEFT LUNG: ICD-10-CM

## 2025-04-10 DIAGNOSIS — Z85.850 HISTORY OF THYROID CANCER: ICD-10-CM

## 2025-04-10 DIAGNOSIS — Z85.118 HISTORY OF LUNG CANCER IN ADULTHOOD: Primary | ICD-10-CM

## 2025-04-10 DIAGNOSIS — Z85.41 HISTORY OF CERVICAL CANCER: ICD-10-CM

## 2025-04-10 PROCEDURE — 1159F MED LIST DOCD IN RCRD: CPT | Mod: CPTII,S$GLB,, | Performed by: INTERNAL MEDICINE

## 2025-04-10 PROCEDURE — 1126F AMNT PAIN NOTED NONE PRSNT: CPT | Mod: CPTII,S$GLB,, | Performed by: INTERNAL MEDICINE

## 2025-04-10 PROCEDURE — 3074F SYST BP LT 130 MM HG: CPT | Mod: CPTII,S$GLB,, | Performed by: INTERNAL MEDICINE

## 2025-04-10 PROCEDURE — 3008F BODY MASS INDEX DOCD: CPT | Mod: CPTII,S$GLB,, | Performed by: INTERNAL MEDICINE

## 2025-04-10 PROCEDURE — 99214 OFFICE O/P EST MOD 30 MIN: CPT | Mod: S$GLB,,, | Performed by: INTERNAL MEDICINE

## 2025-04-10 PROCEDURE — 99999 PR PBB SHADOW E&M-EST. PATIENT-LVL V: CPT | Mod: PBBFAC,,, | Performed by: INTERNAL MEDICINE

## 2025-04-10 PROCEDURE — 3078F DIAST BP <80 MM HG: CPT | Mod: CPTII,S$GLB,, | Performed by: INTERNAL MEDICINE

## 2025-04-10 PROCEDURE — 1160F RVW MEDS BY RX/DR IN RCRD: CPT | Mod: CPTII,S$GLB,, | Performed by: INTERNAL MEDICINE

## 2025-04-10 PROCEDURE — 3288F FALL RISK ASSESSMENT DOCD: CPT | Mod: CPTII,S$GLB,, | Performed by: INTERNAL MEDICINE

## 2025-04-10 PROCEDURE — 1101F PT FALLS ASSESS-DOCD LE1/YR: CPT | Mod: CPTII,S$GLB,, | Performed by: INTERNAL MEDICINE

## 2025-04-10 PROCEDURE — G2211 COMPLEX E/M VISIT ADD ON: HCPCS | Mod: S$GLB,,, | Performed by: INTERNAL MEDICINE

## 2025-04-14 ENCOUNTER — DOCUMENTATION ONLY (OUTPATIENT)
Dept: FAMILY MEDICINE | Facility: CLINIC | Age: 70
End: 2025-04-14
Payer: COMMERCIAL

## 2025-05-04 DIAGNOSIS — F32.5 MAJOR DEPRESSION IN COMPLETE REMISSION: ICD-10-CM

## 2025-05-05 ENCOUNTER — HOSPITAL ENCOUNTER (OUTPATIENT)
Dept: RADIOLOGY | Facility: HOSPITAL | Age: 70
Discharge: HOME | End: 2025-05-05
Attending: FAMILY MEDICINE
Payer: COMMERCIAL

## 2025-05-05 DIAGNOSIS — Z12.31 ENCOUNTER FOR SCREENING MAMMOGRAM FOR BREAST CANCER: ICD-10-CM

## 2025-05-05 PROCEDURE — 77067 SCR MAMMO BI INCL CAD: CPT | Mod: TC

## 2025-05-05 PROCEDURE — 77063 BREAST TOMOSYNTHESIS BI: CPT | Mod: 26,,, | Performed by: RADIOLOGY

## 2025-05-05 PROCEDURE — 77067 SCR MAMMO BI INCL CAD: CPT | Mod: 26,,, | Performed by: RADIOLOGY

## 2025-05-05 RX ORDER — VENLAFAXINE HYDROCHLORIDE 75 MG/1
75 CAPSULE, EXTENDED RELEASE ORAL
Qty: 90 CAPSULE | Refills: 0 | Status: SHIPPED | OUTPATIENT
Start: 2025-05-05

## 2025-05-06 ENCOUNTER — RESULTS FOLLOW-UP (OUTPATIENT)
Dept: FAMILY MEDICINE | Facility: CLINIC | Age: 70
End: 2025-05-06
Payer: COMMERCIAL

## 2025-05-06 ENCOUNTER — LAB VISIT (OUTPATIENT)
Dept: LAB | Facility: HOSPITAL | Age: 70
End: 2025-05-06
Attending: INTERNAL MEDICINE
Payer: COMMERCIAL

## 2025-05-06 DIAGNOSIS — G54.9 COMPRESSION OF SPINAL NERVE ROOT: ICD-10-CM

## 2025-05-06 DIAGNOSIS — Z79.899 ENCOUNTER FOR LONG-TERM (CURRENT) USE OF MEDICATIONS: ICD-10-CM

## 2025-05-06 DIAGNOSIS — I10 HYPERTENSION, UNSPECIFIED TYPE: ICD-10-CM

## 2025-05-06 DIAGNOSIS — R53.83 FATIGUE, UNSPECIFIED TYPE: ICD-10-CM

## 2025-05-06 DIAGNOSIS — R00.2 PALPITATIONS: Primary | ICD-10-CM

## 2025-05-06 LAB
BASOPHILS # BLD AUTO: 0.05 X10(3)/MCL
BASOPHILS NFR BLD AUTO: 0.6 %
EOSINOPHIL # BLD AUTO: 0.29 X10(3)/MCL (ref 0–0.9)
EOSINOPHIL NFR BLD AUTO: 3.2 %
ERYTHROCYTE [DISTWIDTH] IN BLOOD BY AUTOMATED COUNT: 18.9 % (ref 11.5–17)
HCT VFR BLD AUTO: 45 % (ref 37–47)
HGB BLD-MCNC: 14.8 G/DL (ref 12–16)
IMM GRANULOCYTES # BLD AUTO: 0.04 X10(3)/MCL (ref 0–0.04)
IMM GRANULOCYTES NFR BLD AUTO: 0.4 %
LYMPHOCYTES # BLD AUTO: 3.11 X10(3)/MCL (ref 0.6–4.6)
LYMPHOCYTES NFR BLD AUTO: 34.6 %
MCH RBC QN AUTO: 27.4 PG (ref 27–31)
MCHC RBC AUTO-ENTMCNC: 32.9 G/DL (ref 33–36)
MCV RBC AUTO: 83.2 FL (ref 80–94)
MONOCYTES # BLD AUTO: 0.7 X10(3)/MCL (ref 0.1–1.3)
MONOCYTES NFR BLD AUTO: 7.8 %
NEUTROPHILS # BLD AUTO: 4.81 X10(3)/MCL (ref 2.1–9.2)
NEUTROPHILS NFR BLD AUTO: 53.4 %
NRBC BLD AUTO-RTO: 0 %
PLATELET # BLD AUTO: 264 X10(3)/MCL (ref 130–400)
PMV BLD AUTO: 10.7 FL (ref 7.4–10.4)
RBC # BLD AUTO: 5.41 X10(6)/MCL (ref 4.2–5.4)
TSH SERPL-ACNC: 0.12 UIU/ML (ref 0.35–4.94)
WBC # BLD AUTO: 9 X10(3)/MCL (ref 4.5–11.5)

## 2025-05-06 PROCEDURE — 85025 COMPLETE CBC W/AUTO DIFF WBC: CPT

## 2025-05-06 PROCEDURE — 36415 COLL VENOUS BLD VENIPUNCTURE: CPT

## 2025-05-06 PROCEDURE — 84443 ASSAY THYROID STIM HORMONE: CPT

## 2025-06-13 ENCOUNTER — LAB VISIT (OUTPATIENT)
Dept: LAB | Facility: HOSPITAL | Age: 70
End: 2025-06-13
Attending: INTERNAL MEDICINE
Payer: COMMERCIAL

## 2025-06-13 DIAGNOSIS — E03.9 HYPOTHYROIDISM, UNSPECIFIED TYPE: Primary | ICD-10-CM

## 2025-06-13 DIAGNOSIS — R00.2 PALPITATIONS: ICD-10-CM

## 2025-06-13 LAB
T3FREE SERPL-MCNC: 2.44 PG/ML (ref 1.58–3.91)
T4 FREE SERPL-MCNC: 1.15 NG/DL (ref 0.7–1.48)

## 2025-06-13 PROCEDURE — 84439 ASSAY OF FREE THYROXINE: CPT

## 2025-06-13 PROCEDURE — 36415 COLL VENOUS BLD VENIPUNCTURE: CPT

## 2025-06-13 PROCEDURE — 84481 FREE ASSAY (FT-3): CPT

## 2025-07-21 ENCOUNTER — LAB VISIT (OUTPATIENT)
Dept: LAB | Facility: HOSPITAL | Age: 70
End: 2025-07-21
Attending: FAMILY MEDICINE
Payer: COMMERCIAL

## 2025-07-21 DIAGNOSIS — E89.0 POSTSURGICAL HYPOTHYROIDISM: Primary | ICD-10-CM

## 2025-07-21 DIAGNOSIS — D64.9 ANEMIA, UNSPECIFIED TYPE: ICD-10-CM

## 2025-07-21 DIAGNOSIS — I10 ESSENTIAL HYPERTENSION, MALIGNANT: ICD-10-CM

## 2025-07-21 DIAGNOSIS — E61.1 IRON DEFICIENCY: ICD-10-CM

## 2025-07-21 DIAGNOSIS — E11.9 TYPE 2 DIABETES MELLITUS WITHOUT COMPLICATION, WITHOUT LONG-TERM CURRENT USE OF INSULIN: ICD-10-CM

## 2025-07-21 LAB
BASOPHILS # BLD AUTO: 0.07 X10(3)/MCL
BASOPHILS NFR BLD AUTO: 0.7 %
EOSINOPHIL # BLD AUTO: 0.48 X10(3)/MCL (ref 0–0.9)
EOSINOPHIL NFR BLD AUTO: 5.1 %
ERYTHROCYTE [DISTWIDTH] IN BLOOD BY AUTOMATED COUNT: 12.5 % (ref 11.5–17)
HCT VFR BLD AUTO: 42.5 % (ref 37–47)
HGB BLD-MCNC: 14 G/DL (ref 12–16)
IMM GRANULOCYTES # BLD AUTO: 0.06 X10(3)/MCL (ref 0–0.04)
IMM GRANULOCYTES NFR BLD AUTO: 0.6 %
LYMPHOCYTES # BLD AUTO: 3.2 X10(3)/MCL (ref 0.6–4.6)
LYMPHOCYTES NFR BLD AUTO: 33.8 %
MCH RBC QN AUTO: 29.1 PG (ref 27–31)
MCHC RBC AUTO-ENTMCNC: 32.9 G/DL (ref 33–36)
MCV RBC AUTO: 88.4 FL (ref 80–94)
MONOCYTES # BLD AUTO: 0.6 X10(3)/MCL (ref 0.1–1.3)
MONOCYTES NFR BLD AUTO: 6.3 %
NEUTROPHILS # BLD AUTO: 5.07 X10(3)/MCL (ref 2.1–9.2)
NEUTROPHILS NFR BLD AUTO: 53.5 %
NRBC BLD AUTO-RTO: 0 %
PLATELET # BLD AUTO: 240 X10(3)/MCL (ref 130–400)
PMV BLD AUTO: 10.8 FL (ref 7.4–10.4)
RBC # BLD AUTO: 4.81 X10(6)/MCL (ref 4.2–5.4)
TSH SERPL-ACNC: 0.08 UIU/ML (ref 0.35–4.94)
WBC # BLD AUTO: 9.48 X10(3)/MCL (ref 4.5–11.5)

## 2025-07-21 PROCEDURE — 84439 ASSAY OF FREE THYROXINE: CPT

## 2025-07-21 PROCEDURE — 82728 ASSAY OF FERRITIN: CPT

## 2025-07-21 PROCEDURE — 84443 ASSAY THYROID STIM HORMONE: CPT

## 2025-07-21 PROCEDURE — 84425 ASSAY OF VITAMIN B-1: CPT

## 2025-07-21 PROCEDURE — 82607 VITAMIN B-12: CPT

## 2025-07-21 PROCEDURE — 82746 ASSAY OF FOLIC ACID SERUM: CPT

## 2025-07-21 PROCEDURE — 82306 VITAMIN D 25 HYDROXY: CPT

## 2025-07-21 PROCEDURE — 83540 ASSAY OF IRON: CPT

## 2025-07-21 PROCEDURE — 36415 COLL VENOUS BLD VENIPUNCTURE: CPT

## 2025-07-21 PROCEDURE — 85025 COMPLETE CBC W/AUTO DIFF WBC: CPT

## 2025-07-22 LAB
25(OH)D3+25(OH)D2 SERPL-MCNC: 81 NG/ML (ref 30–80)
FERRITIN SERPL-MCNC: 379.62 NG/ML (ref 4.63–204)
FOLATE SERPL-MCNC: 12.4 NG/ML (ref 7–31.4)
IRON SATN MFR SERPL: 25 % (ref 20–50)
IRON SERPL-MCNC: 63 UG/DL (ref 50–170)
T4 FREE SERPL-MCNC: 1.19 NG/DL (ref 0.7–1.48)
TIBC SERPL-MCNC: 190 UG/DL (ref 70–310)
TIBC SERPL-MCNC: 253 UG/DL (ref 250–450)
TRANSFERRIN SERPL-MCNC: 225 MG/DL (ref 173–360)
VIT B12 SERPL-MCNC: 562 PG/ML (ref 213–816)

## 2025-07-25 LAB — VIT B1 BLD-SCNC: 184 NMOL/L (ref 70–180)

## 2025-08-15 ENCOUNTER — INFUSION (OUTPATIENT)
Dept: INFUSION THERAPY | Facility: HOSPITAL | Age: 70
End: 2025-08-15
Attending: FAMILY MEDICINE
Payer: COMMERCIAL

## 2025-08-15 VITALS
HEIGHT: 66 IN | DIASTOLIC BLOOD PRESSURE: 68 MMHG | BODY MASS INDEX: 22.5 KG/M2 | WEIGHT: 140 LBS | OXYGEN SATURATION: 98 % | RESPIRATION RATE: 18 BRPM | SYSTOLIC BLOOD PRESSURE: 113 MMHG | TEMPERATURE: 98 F | HEART RATE: 72 BPM

## 2025-08-15 DIAGNOSIS — M81.0 OSTEOPOROSIS, UNSPECIFIED OSTEOPOROSIS TYPE, UNSPECIFIED PATHOLOGICAL FRACTURE PRESENCE: Primary | ICD-10-CM

## 2025-08-15 DIAGNOSIS — M81.0 AGE-RELATED OSTEOPOROSIS WITHOUT CURRENT PATHOLOGICAL FRACTURE: ICD-10-CM

## 2025-08-15 LAB
ALBUMIN SERPL-MCNC: 4.2 G/DL (ref 3.4–4.8)
ALBUMIN/GLOB SERPL: 1.3 RATIO (ref 1.1–2)
ALP SERPL-CCNC: 109 UNIT/L (ref 40–150)
ALT SERPL-CCNC: 37 UNIT/L (ref 0–55)
ANION GAP SERPL CALC-SCNC: 9 MEQ/L
AST SERPL-CCNC: 26 UNIT/L (ref 11–45)
BILIRUB SERPL-MCNC: 0.4 MG/DL
BUN SERPL-MCNC: 12 MG/DL (ref 9.8–20.1)
CALCIUM SERPL-MCNC: 9.8 MG/DL (ref 8.4–10.2)
CHLORIDE SERPL-SCNC: 106 MMOL/L (ref 98–107)
CO2 SERPL-SCNC: 29 MMOL/L (ref 23–31)
CREAT SERPL-MCNC: 0.7 MG/DL (ref 0.55–1.02)
CREAT/UREA NIT SERPL: 17
GFR SERPLBLD CREATININE-BSD FMLA CKD-EPI: >60 ML/MIN/1.73/M2
GLOBULIN SER-MCNC: 3.2 GM/DL (ref 2.4–3.5)
GLUCOSE SERPL-MCNC: 100 MG/DL (ref 82–115)
POTASSIUM SERPL-SCNC: 4.5 MMOL/L (ref 3.5–5.1)
PROT SERPL-MCNC: 7.4 GM/DL (ref 5.8–7.6)
SODIUM SERPL-SCNC: 144 MMOL/L (ref 136–145)

## 2025-08-15 PROCEDURE — 96372 THER/PROPH/DIAG INJ SC/IM: CPT

## 2025-08-15 PROCEDURE — 80053 COMPREHEN METABOLIC PANEL: CPT

## 2025-08-15 PROCEDURE — 63600175 PHARM REV CODE 636 W HCPCS: Mod: JZ,TB | Performed by: FAMILY MEDICINE

## 2025-08-15 RX ORDER — METFORMIN HYDROCHLORIDE 1000 MG/1
1000 TABLET ORAL 2 TIMES DAILY WITH MEALS
COMMUNITY

## 2025-08-15 RX ADMIN — DENOSUMAB 60 MG: 60 INJECTION SUBCUTANEOUS at 10:08
